# Patient Record
Sex: FEMALE | Employment: OTHER | URBAN - METROPOLITAN AREA
[De-identification: names, ages, dates, MRNs, and addresses within clinical notes are randomized per-mention and may not be internally consistent; named-entity substitution may affect disease eponyms.]

---

## 2017-04-19 ENCOUNTER — OFFICE VISIT (OUTPATIENT)
Dept: FAMILY MEDICINE | Facility: CLINIC | Age: 47
End: 2017-04-19
Payer: MEDICARE

## 2017-04-19 ENCOUNTER — TELEPHONE (OUTPATIENT)
Dept: FAMILY MEDICINE | Facility: CLINIC | Age: 47
End: 2017-04-19

## 2017-04-19 VITALS
OXYGEN SATURATION: 96 % | WEIGHT: 181 LBS | TEMPERATURE: 98 F | DIASTOLIC BLOOD PRESSURE: 80 MMHG | HEIGHT: 66 IN | BODY MASS INDEX: 29.09 KG/M2 | HEART RATE: 86 BPM | SYSTOLIC BLOOD PRESSURE: 120 MMHG

## 2017-04-19 DIAGNOSIS — M54.41 LOW BACK PAIN WITH RIGHT-SIDED SCIATICA, UNSPECIFIED BACK PAIN LATERALITY, UNSPECIFIED CHRONICITY: ICD-10-CM

## 2017-04-19 DIAGNOSIS — L70.0 ACNE VULGARIS: ICD-10-CM

## 2017-04-19 DIAGNOSIS — J30.2 SEASONAL ALLERGIC RHINITIS, UNSPECIFIED ALLERGIC RHINITIS TRIGGER: ICD-10-CM

## 2017-04-19 DIAGNOSIS — B96.89 BACTERIAL VAGINOSIS: ICD-10-CM

## 2017-04-19 DIAGNOSIS — R30.0 DYSURIA: Primary | ICD-10-CM

## 2017-04-19 DIAGNOSIS — Z91.010 PEANUT ALLERGY: ICD-10-CM

## 2017-04-19 DIAGNOSIS — N76.0 BACTERIAL VAGINOSIS: ICD-10-CM

## 2017-04-19 DIAGNOSIS — F31.9 BIPOLAR AFFECTIVE DISORDER, REMISSION STATUS UNSPECIFIED (H): ICD-10-CM

## 2017-04-19 LAB
ALBUMIN UR-MCNC: NEGATIVE MG/DL
APPEARANCE UR: CLEAR
BACTERIA #/AREA URNS HPF: ABNORMAL /HPF
BILIRUB UR QL STRIP: NEGATIVE
COLOR UR AUTO: YELLOW
GLUCOSE UR STRIP-MCNC: NEGATIVE MG/DL
HGB UR QL STRIP: ABNORMAL
KETONES UR STRIP-MCNC: NEGATIVE MG/DL
LEUKOCYTE ESTERASE UR QL STRIP: NEGATIVE
MICRO REPORT STATUS: ABNORMAL
NITRATE UR QL: NEGATIVE
NON-SQ EPI CELLS #/AREA URNS LPF: ABNORMAL /LPF
PH UR STRIP: 6 PH (ref 5–7)
RBC #/AREA URNS AUTO: ABNORMAL /HPF (ref 0–2)
SP GR UR STRIP: 1.02 (ref 1–1.03)
SPECIMEN SOURCE: ABNORMAL
URN SPEC COLLECT METH UR: ABNORMAL
UROBILINOGEN UR STRIP-ACNC: 0.2 EU/DL (ref 0.2–1)
WBC #/AREA URNS AUTO: ABNORMAL /HPF (ref 0–2)
WET PREP SPEC: ABNORMAL

## 2017-04-19 PROCEDURE — 81001 URINALYSIS AUTO W/SCOPE: CPT | Performed by: PHYSICIAN ASSISTANT

## 2017-04-19 PROCEDURE — 87210 SMEAR WET MOUNT SALINE/INK: CPT | Performed by: PHYSICIAN ASSISTANT

## 2017-04-19 PROCEDURE — 99214 OFFICE O/P EST MOD 30 MIN: CPT | Performed by: PHYSICIAN ASSISTANT

## 2017-04-19 RX ORDER — EPINEPHRINE 0.3 MG/.3ML
0.3 INJECTION SUBCUTANEOUS PRN
Qty: 0.6 ML | Refills: 1 | Status: SHIPPED | OUTPATIENT
Start: 2017-04-19 | End: 2018-01-31

## 2017-04-19 RX ORDER — METRONIDAZOLE 500 MG/1
500 TABLET ORAL 2 TIMES DAILY
Qty: 14 TABLET | Refills: 0 | Status: SHIPPED | OUTPATIENT
Start: 2017-04-19 | End: 2017-07-26

## 2017-04-19 RX ORDER — CETIRIZINE HYDROCHLORIDE 5 MG/1
5 TABLET ORAL DAILY
Qty: 30 TABLET | Refills: 1 | Status: SHIPPED | OUTPATIENT
Start: 2017-04-19 | End: 2018-01-31

## 2017-04-19 RX ORDER — LORATADINE 10 MG/1
10 TABLET ORAL DAILY
Qty: 90 TABLET | Refills: 1 | Status: CANCELLED | OUTPATIENT
Start: 2017-04-19

## 2017-04-19 NOTE — PROGRESS NOTES
"  SUBJECTIVE:                                                    Bernarda Garrett is a 47 year old female who presents to clinic today for the following health issues:    Triaged 04/19/2017    Vaginal Symptoms     Onset: 4 days ago    Description:  Vaginal Discharge: white   Itching (Pruritis): no   Burning sensation: no   Odor: YES- fishy    Accompanying Signs & Symptoms:  Pain with Urination: YES  Abdominal Pain: YES  Fever: no    History:   Sexually active: YES  New Partner: no   Possibility of Pregnancy: No    Precipitating factors:   Recent Antibiotic Use: YES- bladder infection    Alleviating factors:  nothing   Therapies Tried and outcome: Appt needed      Rt sided abdominal pain and back pain.       Pt also needs new face medicine as her face is breaking out.     Patient states that she has increased vaginal discharge that has a \"fishy\" odor.  She reports that she also has some pelvic cramping and mild lower back pressure.      She also reports that she would like an Rx for her Epi-pen, allergy medication and vitamin D.      Problem list and histories reviewed & adjusted, as indicated.  Additional history: as documented      ROS:  Constitutional, HEENT, cardiovascular, pulmonary, GI, , musculoskeletal, neuro, skin, endocrine and psych systems are negative, except as otherwise noted.    OBJECTIVE:                                                    /80 (BP Location: Left arm, Patient Position: Chair, Cuff Size: Adult Large)  Pulse 86  Temp 98  F (36.7  C) (Oral)  Ht 5' 6\" (1.676 m)  Wt 181 lb (82.1 kg)  SpO2 96%  BMI 29.21 kg/m2  Body mass index is 29.21 kg/(m^2).  GENERAL: healthy, alert and no distress  EYES: Eyes grossly normal to inspection, PERRL and conjunctivae and sclerae normal  RESP: lungs clear to auscultation - no rales, rhonchi or wheezes  CV: regular rate and rhythm, normal S1 S2, no S3 or S4, no murmur, click or rub, no peripheral edema and peripheral pulses strong  ABDOMEN: soft, " nontender, no hepatosplenomegaly, no masses and bowel sounds normal  MS: no gross musculoskeletal defects noted, no edema  NEURO: Normal strength and tone, mentation intact and speech normal  BACK: no CVA tenderness, no paralumbar tenderness  PSYCH: mentation appears normal, affect normal/bright    Diagnostic Test Results:  Wet Prep - +Clue Cells  Urinalysis - unremarkable     ASSESSMENT/PLAN:                                                      Bernarda was seen today for abdominal pain.    Diagnoses and all orders for this visit:    Dysuria  -     Cancel: *UA reflex to Microscopic and Culture ([place] [facility] (except [place] and [place])  -     Cancel: Wet prep  -     UA reflex to Microscopic and Culture  -     Wet prep  -     Urine Microscopic    Bacterial vaginosis  -     metroNIDAZOLE (FLAGYL) 500 MG tablet; Take 1 tablet (500 mg) by mouth 2 times daily For 7 days. DO NOT drink alcohol while taking medication.    Bipolar affective disorder, remission status unspecified (H)  -     cholecalciferol (VITAMIN D) 1000 UNIT tablet; Take 1 tablet (1,000 Units) by mouth daily    Low back pain with right-sided sciatica, unspecified back pain laterality, unspecified chronicity  -     PHYSICAL THERAPY REFERRAL    Seasonal allergic rhinitis, unspecified allergic rhinitis trigger  -     cetirizine (ZYRTEC) 5 MG tablet; Take 1 tablet (5 mg) by mouth daily    Peanut allergy  -     EPINEPHrine 0.3 MG/0.3ML injection; Inject 0.3 mLs (0.3 mg) into the muscle as needed for anaphylaxis    Acne vulgaris  -     DERMATOLOGY REFERRAL    Other orders  -     Cancel: loratadine (CLARITIN) 10 MG tablet; Take 1 tablet (10 mg) by mouth daily      - Patient advised to do physical therapy for the back pain that has been stable, but chronic.      - We also discussed patient's costochondritis.  She was instructed to try 600 mg ibuprofen three times daily for about 5-7 days.      - She was instructed to be seen sooner if symptoms  change or worsen.      - Over 30 minutes was spent with patient and over 50% of the time was spent counseling and coordination of care.      See Patient Instructions:  Please take the metronidazole medication for the vaginal bacterial infection BV.  Do not drink alcohol on this medication while taking it and for the three days after stopping it.     Please follow-up if symptoms are not improved.     Please see Dr. Billy for the acne.     Please try the 5 mg Zyrtec for the allergies.      Please followup as needed.          Tiffanie Shannon PA-C    [facility] [place]

## 2017-04-19 NOTE — TELEPHONE ENCOUNTER
Vaginal Symptoms     Onset: 4 days ago    Description:  Vaginal Discharge: white   Itching (Pruritis): no   Burning sensation:  no   Odor: YES- fishy    Accompanying Signs & Symptoms:  Pain with Urination: YES  Abdominal Pain: YES  Fever: no    History:   Sexually active: YES  New Partner: no   Possibility of Pregnancy:  No    Precipitating factors:   Recent Antibiotic Use: YES- bladder infection    Alleviating factors:  nothing   Therapies Tried and outcome: Appt needed     Rt sided abdominal pain and back pain.       Pt also needs new face medicine as her face is breaking out.     Cely Woods RN    [facility]

## 2017-04-19 NOTE — PATIENT INSTRUCTIONS
Please take the metronidazole medication for the vaginal bacterial infection BV.  Do not drink alcohol on this medication while taking it and for the three days after stopping it.     Please follow-up if symptoms are not improved.     Please see Dr. Billy for the acne.     Please try the 5 mg Zyrtec for the allergies.      Please followup as needed.      Bacterial Vaginosis    You have a vaginal infection called bacterial vaginosis (BV). Both good and bad bacteria are present in a healthy vagina. BV occurs when these bacteria get out of balance. The number of bad bacteria increase. And the number of good bacteria decrease.  BV may or may not cause symptoms. If symptoms do occur, they can include:    Thin, gray, milky-white, or sometimes green discharge    Unpleasant odor or  fishy  smell    Itching, burning, or pain in or around the vagina  It is not known what causes BV, but certain factors can make the problem more likely. This can include:    Douching    Having sex with a new partner    Having sex with more than one partner  BV will sometimes go away on its own. But treatment is usually recommended. This is because untreated BV can increase the risk of more serious health problems such as:    Pelvic inflammatory disease (PID)     delivery (giving birth to a baby early if you re pregnant)    HIV and certain other sexually transmitted diseases (STDs)    Infection after surgery on the reproductive organs  Home care  General care    BV is most often treated with medicines called antibiotics. These may be given as pills or as a vaginal cream. If antibiotics are prescribed, be sure to use them exactly as directed. Also, be sure to complete all of the medicine, even if your symptoms go away.    Avoid douching or having sex during treatment.    If you have sex with a female partner, ask your healthcare provider if she should also be treated.  Prevention    Limit or avoid douching.    Avoid having sex. If you do  have sex, then take steps to lower your risk:    Use condoms when having sex.    Limit the number of partners you have sex with.  Follow-up care  Follow up with your healthcare provider, or as advised.  When to seek medical advice  Call your healthcare provider right away if:    You have a fever of 100.4 F (38 C) or higher, or as directed by your provider.    Your symptoms worsen, or they don t go away within a few days of starting treatment.    You have new pain in the lower belly or pelvic region.    You have side effects that bother you or a reaction to the pills or cream you re prescribed.    You or any partners you have sex with have new symptoms, such as a rash, joint pain, or sores.    5184-2497 [employer]. [address] [place] [address], [place] [address]. All rights reserved. This information is not intended as a substitute for professional medical care. Always follow your healthcare professional's instructions.

## 2017-04-19 NOTE — MR AVS SNAPSHOT
After Visit Summary   4/19/2017    Bernarda Garrett    MRN: [id number]           Patient Information     Date Of Birth          1970        Visit Information        Provider Department      4/19/2017 3:00 PM Tiffanie Shannon PA-C [facility] [place]        Today's Diagnoses     Dysuria    -  1    Bipolar affective disorder, remission status unspecified (H)        Dysfunction of eustachian tube, bilateral        Low back pain with right-sided sciatica, unspecified back pain laterality, unspecified chronicity        Seasonal allergic rhinitis, unspecified allergic rhinitis trigger        Food allergy, peanut        Peanut allergy        Bacterial vaginosis        Acne vulgaris          Care Instructions    Please take the metronidazole medication for the vaginal bacterial infection BV.  Do not drink alcohol on this medication while taking it and for the three days after stopping it.     Please follow-up if symptoms are not improved.     Please see Dr. Billy for the acne.     Please try the 5 mg Zyrtec for the allergies.      Please followup as needed.      Bacterial Vaginosis    You have a vaginal infection called bacterial vaginosis (BV). Both good and bad bacteria are present in a healthy vagina. BV occurs when these bacteria get out of balance. The number of bad bacteria increase. And the number of good bacteria decrease.  BV may or may not cause symptoms. If symptoms do occur, they can include:    Thin, gray, milky-white, or sometimes green discharge    Unpleasant odor or  fishy  smell    Itching, burning, or pain in or around the vagina  It is not known what causes BV, but certain factors can make the problem more likely. This can include:    Douching    Having sex with a new partner    Having sex with more than one partner  BV will sometimes go away on its own. But treatment is usually recommended. This is because untreated BV can increase the risk of more serious health problems such  as:    Pelvic inflammatory disease (PID)     delivery (giving birth to a baby early if you re pregnant)    HIV and certain other sexually transmitted diseases (STDs)    Infection after surgery on the reproductive organs  Home care  General care    BV is most often treated with medicines called antibiotics. These may be given as pills or as a vaginal cream. If antibiotics are prescribed, be sure to use them exactly as directed. Also, be sure to complete all of the medicine, even if your symptoms go away.    Avoid douching or having sex during treatment.    If you have sex with a female partner, ask your healthcare provider if she should also be treated.  Prevention    Limit or avoid douching.    Avoid having sex. If you do have sex, then take steps to lower your risk:    Use condoms when having sex.    Limit the number of partners you have sex with.  Follow-up care  Follow up with your healthcare provider, or as advised.  When to seek medical advice  Call your healthcare provider right away if:    You have a fever of 100.4 F (38 C) or higher, or as directed by your provider.    Your symptoms worsen, or they don t go away within a few days of starting treatment.    You have new pain in the lower belly or pelvic region.    You have side effects that bother you or a reaction to the pills or cream you re prescribed.    You or any partners you have sex with have new symptoms, such as a rash, joint pain, or sores.    8392-5884 [employer]. [address] [place] [address]. All rights reserved. This information is not intended as a substitute for professional medical care. Always follow your healthcare professional's instructions.              Follow-ups after your visit        Additional Services     DERMATOLOGY REFERRAL       Your provider has referred you to: [place]: [facility] [place] [phone]   [email or web address]    Please be  aware that coverage of these services is subject to the terms and limitations of your health insurance plan.  Call member services at your health plan with any benefit or coverage questions.      Please bring the following with you to your appointment:    (1) Any X-Rays, CTs or MRIs which have been performed.  Contact the facility where they were done to arrange for  prior to your scheduled appointment.    (2) List of current medications  (3) This referral request   (4) Any documents/labs given to you for this referral            PHYSICAL THERAPY REFERRAL       *This therapy referral will be filtered to a centralized scheduling office at [facility] and the patient will receive a call to schedule an appointment at a [facility] location most convenient for them. *     [facility] provides Physical Therapy evaluation and treatment and many specialty services across the [facility] system.  If requesting a specialty program, please choose from the list below.    If you have not heard from the scheduling office within 2 business days, please call [phone] for all locations, with the exception of [place], please call [phone].  Treatment: Evaluation & Treatment  Special Instructions/Modalities: None  Special Programs: None    Please be aware that coverage of these services is subject to the terms and limitations of your health insurance plan.  Call member services at your health plan with any benefit or coverage questions.      **Note to Provider:  If you are referring outside of [facility] for the therapy appointment, please list the name of the location in the  special instructions  above, print the referral and give to the patient to schedule the appointment.                  Your next 10 appointments already scheduled     Apr 19, 2017  3:00 PM CDT   [facility]    [address]  [address]  [facility] [address]   [phone]           Bring a current list of meds and any records pertaining to this visit.  For Physicals, please bring immunization records and any forms needing to be filled out.  Please arrive 10 minutes early to complete paperwork.              Who to contact     If you have questions or need follow up information about today's clinic visit or your schedule please contact [facility] directly at [phone].  Normal or non-critical lab and imaging results will be communicated to you by MyChart, letter or phone within 4 business days after the clinic has received the results. If you do not hear from us within 7 days, please contact the clinic through [employer] or phone. If you have a critical or abnormal lab result, we will notify you by phone as soon as possible.  Submit refill requests through [employer] or call your pharmacy and they will forward the refill request to us. Please allow 3 business days for your refill to be completed.          Additional Information About Your Visit        [facility] [facility] Information     [employer] lets you send messages to your doctor, view your test results, renew your prescriptions, schedule appointments and more. To sign up, go to [email or web address] . Click on \"Log in\" on the left side of the screen, which will take you to the Welcome page. Then click on \"Sign up Now\" on the right side of the page.     You will be asked to enter the access code listed below, as well as some personal information. Please follow the directions to create your username and password.     Your access code is: [email or web address]  Expires: 2017  2:38 PM     Your access code will  in 90 days. If you need help or a new code, please call your [place] clinic or [phone].        Care EveryWhere ID     This is your Care EveryWhere ID. This could be used by other organizations to access your [place] medical records  [id number]        Your Vitals " "Were     Pulse Temperature Height Pulse Oximetry BMI (Body Mass Index)       86 98  F (36.7  C) (Oral) 5' 6\" (1.676 m) 96% 29.21 kg/m2        Blood Pressure from Last 3 Encounters:   04/19/17 120/80   06/29/16 116/82   04/05/16 122/80    Weight from Last 3 Encounters:   04/19/17 181 lb (82.1 kg)   06/29/16 187 lb 3.2 oz (84.9 kg)   04/05/16 188 lb 12.8 oz (85.6 kg)              We Performed the Following     DERMATOLOGY REFERRAL     PHYSICAL THERAPY REFERRAL     UA reflex to Microscopic and Culture     Urine Microscopic     Wet prep          Today's Medication Changes          These changes are accurate as of: 4/19/17  2:49 PM.  If you have any questions, ask your nurse or doctor.               Start taking these medicines.        Dose/Directions    cetirizine 5 MG tablet   Commonly known as:  zyrTEC   Used for:  Seasonal allergic rhinitis, unspecified allergic rhinitis trigger   Started by:  Tiffanie Shannon PA-C        Dose:  5 mg   Take 1 tablet (5 mg) by mouth daily   Quantity:  30 tablet   Refills:  1       metroNIDAZOLE 500 MG tablet   Commonly known as:  FLAGYL   Used for:  Bacterial vaginosis   Started by:  Tiffanie Shannon PA-C        Dose:  500 mg   Take 1 tablet (500 mg) by mouth 2 times daily For 7 days. DO NOT drink alcohol while taking medication.   Quantity:  14 tablet   Refills:  0         These medicines have changed or have updated prescriptions.        Dose/Directions    * EPINEPHrine 0.3 MG/0.3ML injection   This may have changed:  Another medication with the same name was added. Make sure you understand how and when to take each.   Used for:  Food allergy, peanut   Changed by:  Odessa Mathew PA-C        Dose:  0.3 mg   Inject 0.3 mLs (0.3 mg) into the muscle once as needed for anaphylaxis   Quantity:  2 each   Refills:  1       * EPINEPHrine 0.3 MG/0.3ML injection   This may have changed:  You were already taking a medication with the same name, and this prescription was added. " Make sure you understand how and when to take each.   Used for:  Peanut allergy   Changed by:  Tiffanie Shannon PA-C        Dose:  0.3 mg   Inject 0.3 mLs (0.3 mg) into the muscle as needed for anaphylaxis   Quantity:  0.6 mL   Refills:  1       * Notice:  This list has 2 medication(s) that are the same as other medications prescribed for you. Read the directions carefully, and ask your doctor or other care provider to review them with you.      Stop taking these medicines if you haven't already. Please contact your care team if you have questions.     clindamycin 1 % solution   Commonly known as:  CLEOCIN T   Stopped by:  Tiffanie Shannon PA-C                Where to get your medicines      These medications were sent to [facility] [place] - [address]  [address] [place] [id number]     Phone:  [phone]     cetirizine 5 MG tablet    cholecalciferol 1000 UNIT tablet    EPINEPHrine 0.3 MG/0.3ML injection    metroNIDAZOLE 500 MG tablet                Primary Care Provider Office Phone #    [facility] [phone]       No address on file        Thank you!     Thank you for choosing [facility]  for your care. Our goal is always to provide you with excellent care. Hearing back from our patients is one way we can continue to improve our services. Please take a few minutes to complete the written survey that you may receive in the mail after your visit with us. Thank you!             Your Updated Medication List - Protect others around you: Learn how to safely use, store and throw away your medicines at www.disposemymeds.org.          This list is accurate as of: 4/19/17  2:49 PM.  Always use your most recent med list.                   Brand Name Dispense Instructions for use    cetirizine 5 MG tablet    zyrTEC    30 tablet    Take 1 tablet (5 mg) by mouth daily       cholecalciferol 1000 UNIT tablet    vitamin D     100 tablet    Take 1 tablet (1,000 Units) by mouth daily       * EPINEPHrine 0.3 MG/0.3ML injection     2 each    Inject 0.3 mLs (0.3 mg) into the muscle once as needed for anaphylaxis       * EPINEPHrine 0.3 MG/0.3ML injection     0.6 mL    Inject 0.3 mLs (0.3 mg) into the muscle as needed for anaphylaxis       metroNIDAZOLE 500 MG tablet    FLAGYL    14 tablet    Take 1 tablet (500 mg) by mouth 2 times daily For 7 days. DO NOT drink alcohol while taking medication.       tretinoin 0.025 % cream    RETIN-A    45 g    Spread a pea size amount into affected area topically at bedtime.  Use sunscreen SPF>20.       * Notice:  This list has 2 medication(s) that are the same as other medications prescribed for you. Read the directions carefully, and ask your doctor or other care provider to review them with you.

## 2017-04-19 NOTE — NURSING NOTE
"Chief Complaint   Patient presents with     Abdominal Pain       Initial /80 (BP Location: Left arm, Patient Position: Chair, Cuff Size: Adult Large)  Pulse 86  Temp 98  F (36.7  C) (Oral)  Ht 5' 6\" (1.676 m)  Wt 181 lb (82.1 kg)  SpO2 96%  BMI 29.21 kg/m2 Estimated body mass index is 29.21 kg/(m^2) as calculated from the following:    Height as of this encounter: 5' 6\" (1.676 m).    Weight as of this encounter: 181 lb (82.1 kg).  Medication Reconciliation: complete  "

## 2017-07-14 DIAGNOSIS — B96.89 BACTERIAL VAGINOSIS: ICD-10-CM

## 2017-07-14 DIAGNOSIS — N76.0 BACTERIAL VAGINOSIS: ICD-10-CM

## 2017-07-14 NOTE — TELEPHONE ENCOUNTER
Reason for Call:  Medication or medication refill:    Do you use a [facility]?  Name of the pharmacy and phone number for the current request:  [facility] - [phone]    Name of the medication requested: Flagyl    Other request: Pt called this morning requesting a refill on her Flagyl medication. I told pt that she may need to be seen again, however, if she does a nurse/ member of Tiffanie Shannon's team would contact her.     Can we leave a detailed message on this number? YES    Phone number patient can be reached at: Cell number on file:    Telephone Information:   Mobile [phone]       Best Time:     Call taken on 7/14/2017 at 10:30 AM by Monik Olsen

## 2017-07-14 NOTE — TELEPHONE ENCOUNTER
metroNIDAZOLE (FLAGYL) 500 MG tablet      Last Written Prescription Date:  4/19/2017  Last Fill Quantity: 14 tablet,   # refills: 0  Last Office Visit with [facility], [facility] or  Health prescribing provider: 4/19/2017  Future Office visit:       Routing refill request to provider for review/approval because:  Drug not on the [facility], [facility] or [facility] refill protocol or controlled substance

## 2017-07-19 RX ORDER — METRONIDAZOLE 500 MG/1
500 TABLET ORAL 2 TIMES DAILY
Qty: 14 TABLET | Refills: 0 | OUTPATIENT
Start: 2017-07-19

## 2017-07-19 NOTE — TELEPHONE ENCOUNTER
Office visit needed.  Pharmacy advised.    Cely Palma, BS, RN, PHN  [facility]) [phone]

## 2017-07-26 ENCOUNTER — OFFICE VISIT (OUTPATIENT)
Dept: FAMILY MEDICINE | Facility: CLINIC | Age: 47
End: 2017-07-26
Payer: MEDICARE

## 2017-07-26 ENCOUNTER — CARE COORDINATION (OUTPATIENT)
Dept: CARE COORDINATION | Facility: CLINIC | Age: 47
End: 2017-07-26

## 2017-07-26 VITALS
OXYGEN SATURATION: 100 % | DIASTOLIC BLOOD PRESSURE: 80 MMHG | HEART RATE: 76 BPM | SYSTOLIC BLOOD PRESSURE: 138 MMHG | BODY MASS INDEX: 28.93 KG/M2 | HEIGHT: 66 IN | WEIGHT: 180 LBS | TEMPERATURE: 98.5 F

## 2017-07-26 DIAGNOSIS — N89.8 VAGINAL ODOR: Primary | ICD-10-CM

## 2017-07-26 DIAGNOSIS — N76.0 BACTERIAL VAGINOSIS: ICD-10-CM

## 2017-07-26 DIAGNOSIS — F31.9 BIPOLAR AFFECTIVE DISORDER, REMISSION STATUS UNSPECIFIED (H): ICD-10-CM

## 2017-07-26 DIAGNOSIS — B96.89 BACTERIAL VAGINOSIS: ICD-10-CM

## 2017-07-26 LAB
MICRO REPORT STATUS: ABNORMAL
SPECIMEN SOURCE: ABNORMAL
WET PREP SPEC: ABNORMAL

## 2017-07-26 PROCEDURE — 99214 OFFICE O/P EST MOD 30 MIN: CPT | Performed by: PHYSICIAN ASSISTANT

## 2017-07-26 PROCEDURE — 87210 SMEAR WET MOUNT SALINE/INK: CPT | Performed by: PHYSICIAN ASSISTANT

## 2017-07-26 RX ORDER — CETIRIZINE HYDROCHLORIDE 5 MG/1
5 TABLET ORAL DAILY
Qty: 30 TABLET | Refills: 1 | Status: CANCELLED | OUTPATIENT
Start: 2017-07-26

## 2017-07-26 ASSESSMENT — ANXIETY QUESTIONNAIRES
2. NOT BEING ABLE TO STOP OR CONTROL WORRYING: NEARLY EVERY DAY
IF YOU CHECKED OFF ANY PROBLEMS ON THIS QUESTIONNAIRE, HOW DIFFICULT HAVE THESE PROBLEMS MADE IT FOR YOU TO DO YOUR WORK, TAKE CARE OF THINGS AT HOME, OR GET ALONG WITH OTHER PEOPLE: EXTREMELY DIFFICULT
6. BECOMING EASILY ANNOYED OR IRRITABLE: NEARLY EVERY DAY
GAD7 TOTAL SCORE: 20
3. WORRYING TOO MUCH ABOUT DIFFERENT THINGS: NEARLY EVERY DAY
7. FEELING AFRAID AS IF SOMETHING AWFUL MIGHT HAPPEN: NEARLY EVERY DAY
1. FEELING NERVOUS, ANXIOUS, OR ON EDGE: NEARLY EVERY DAY
5. BEING SO RESTLESS THAT IT IS HARD TO SIT STILL: MORE THAN HALF THE DAYS

## 2017-07-26 ASSESSMENT — PATIENT HEALTH QUESTIONNAIRE - PHQ9: 5. POOR APPETITE OR OVEREATING: NEARLY EVERY DAY

## 2017-07-26 NOTE — NURSING NOTE
"Chief Complaint   Patient presents with     Depression     Anxiety       Initial /80 (BP Location: Left arm, Patient Position: Chair, Cuff Size: Adult Large)  Pulse 76  Temp 98.5  F (36.9  C) (Oral)  Ht 5' 6\" (1.676 m)  Wt 180 lb (81.6 kg)  SpO2 100%  Breastfeeding? No  BMI 29.05 kg/m2 Estimated body mass index is 29.05 kg/(m^2) as calculated from the following:    Height as of this encounter: 5' 6\" (1.676 m).    Weight as of this encounter: 180 lb (81.6 kg).  Medication Reconciliation: complete   Csaba Mlnarik CMA    "

## 2017-07-26 NOTE — PROGRESS NOTES
"  SUBJECTIVE:                                                    Bernarda Garrett is a 47 year old female who presents to clinic today for the following health issues:      Abnormal Mood Symptoms  Onset: many years    Description:   Depression: YES  Anxiety: YES    Accompanying Signs & Symptoms:  Still participating in activities that you used to enjoy: No - does not do anything  Fatigue: YES- states she is tired, no energy, cries in bathroom away from children  Irritability: YES- and doesn't want kids to think it is them  Difficulty concentrating: YES- head is   Changes in appetite: YES- has no appetite - feels nauseous and ears throb-hears heart beat   Problems with sleep: YES- nightmares - unable to sleep  Heart racing/beating fast : YES- has panic attacks - has therapist see's every wednesday  Thoughts of hurting yourself or others: yes -- states no one can take care of her kids the way she can    History:   Recent stress: YES- Mom is very ill, 4 year old is autistic-has sciatic pain from edipural-disabled  Prior depression hospitalization: YES - 30 days - 18 years ago -- diagnosed with bipolar -- agoraphobia  Family history of depression: unknown  Family history of anxiety: unknown    Precipitating factors:   Alcohol/drug use: no    Alleviating factors:  Kids --- 4 year old -autistic, 6 year old -learning delays, 15 - lazy, 21, 24, 25,27,29 year olds -- 2 grandchildren    Pt is very stand off'ronny - keeps kids home from school some days    Therapies Tried and outcome: Klonopin (Clonazepam), Zoloft (Sertraline) and Tramadol, Lamictal --- has had difficulty gets medications correct for her    Patient feels like she has been feeling like this for a while (at least a year), but it feels like things are bubbling over now.    She reports that she does have thoughts that if she were not here, it would be a relief.  She says that she would not kill herself or harm herself or others.  She states that \"no one can care for my " "kids like me\" and therefore she wouldn't do anything to herself.      I asked the patient if the thought of dying was scary or pleasant.  She said it seemed like a relief.      Her PHQ9 and GAD7 scores are elevated today.      She states that her mom is ill with congestive heart failure.      The patient is newer to the [facility] and does not have a lot of help from family or friends.  She is not .      Patient is crying and emotional in clinic today.      Problem list and histories reviewed & adjusted, as indicated.  Additional history: as documented      ROS:  Constitutional, HEENT, cardiovascular, pulmonary, GI, , musculoskeletal, neuro, skin, endocrine and psych systems are negative, except as otherwise noted.    OBJECTIVE:                                                    /80 (BP Location: Left arm, Patient Position: Chair, Cuff Size: Adult Large)  Pulse 76  Temp 98.5  F (36.9  C) (Oral)  Ht 5' 6\" (1.676 m)  Wt 180 lb (81.6 kg)  SpO2 100%  Breastfeeding? No  BMI 29.05 kg/m2  Body mass index is 29.05 kg/(m^2).  GENERAL: healthy, alert and no distress  MS: no gross musculoskeletal defects noted, no edema  NEURO: Normal strength and tone, mentation intact and speech normal  PSYCH: mentation appears fine, she is crying and emotional in clinic today.      Diagnostic Test Results:  Wet Prep - Pos Clue Cells     ASSESSMENT/PLAN:                                                      Bernarda was seen today for depression and anxiety.    Diagnoses and all orders for this visit:    Vaginal odor  -     Wet prep    Bipolar affective disorder, remission status unspecified (H)  -     CARE COORDINATION REFERRAL    Bacterial vaginosis    Other orders  -     Cancel: cholecalciferol (VITAMIN D) 1000 UNIT tablet; Take 1 tablet (1,000 Units) by mouth daily  -     Cancel: cetirizine (ZYRTEC) 5 MG tablet; Take 1 tablet (5 mg) by mouth daily        - Based on the discussion in clinic and the PHQ9 and GAD7 I " "told the patient that I felt it would be best if she were assessed in the [place] ED for more immediate mental health evaluation.  I did not think that a referral to psychiatry for followup in the future was helpful enough at this time.  I did speak with Clif our care coordinator and evaluation in the ED was advised by her as well.  The patient understood this and agreed to go.  She had concerns about being admitted as she cannot be away from her kids that long.  I told her I was unsure if this would be an admit or not.    The patient did contract for safety today.  She reports to having thoughts that being gone or dead are relieving, but that she does not have a plan to hurt herself.  She states that she need to be around for her kids and therefore would not kill herself.    Patient does not drive, she took medical transport to the clinic today.  She does not have access to a .  I asked the patient if she was willing to go to the hospital via ambulance and she replied \"yes.\"  I stepped out of the exam room to notify Oriana to get this started.  When I came back in, she said that since she could not get a hold of her family (kids) she was not going to the [facility] ER via ambulance.  At this point they were already en route.  She agreed to talk to them when they arrive.    A  with [employer], Darren Pulido, showed up to the clinic first, I gave him a brief report and informed him that she did contract for safety today.  He went in the room and had a brief discussion with the patient.  I was not present for the conversation.  I stepped in at the end and heard the police ask patient if his presence in the room was making her upset and she said no.  She reported again that she did not want to go the the ED via ambulance.      The Paramedics showed up and I gave report to them.  I told them she contracts for safety, the female paramedic stated she understood and went into the room.  I " "entered shortly after.  They were advising that the patient go be assessed at the ED.  The male EMT asked the patient what she told the nurse about her risk of self harm, she repeated exactly what I have stated in my note:  The thought of dying seems like a relief, but she reports that she does not have a plan or an intent of suicide or to hurt herself or others.  She says that she would not hurt herself or her kids because she knows that she is the best person to care for her children.  The male EMT stated that is ok, but based on what the officer heard from the patient \"earlier\" the officer was going to initiate a transport hold until she is assessed by an MD.  The officer, who was also in the room in the corner near the entrance door, nodded yes as the paramedic stated that the office intends to place a hold.  The patient was given the choice to go voluntarily with EMT to the [place] ED or the officer would place the transport hold.  The patient became upset and then agreed to go.  They left clinic abruptly.  No one discussed with me, the officer and the paramedics left with the patient.  The plan is the patient will be ambulance transferred to the [place] ED for further mental health assessment.    I called the [facility] ED and was transferred to the behavioral intake line to give report of the patient's arrival.  A phone message was taken by Jason Goncalves.            See Patient Instructions        Tiffanie Shannon PA-C    [facility]    "

## 2017-07-26 NOTE — MR AVS SNAPSHOT
After Visit Summary   7/26/2017    Bernarda Garrett    MRN: [id number]           Patient Information     Date Of Birth          1970        Visit Information        Provider Department      7/26/2017 3:00 PM Tiffanie Shannon, SAUMYA [facility]        Today's Diagnoses     Vaginal odor    -  1    Bipolar affective disorder, remission status unspecified (H)        Bacterial vaginosis           Follow-ups after your visit        Additional Services     CARE COORDINATION REFERRAL       Services are provided by a Care Coordinator for people with complex needs such as: medical, social, or financial troubles.  The Care Coordinator works with the patient and their Primary Care Provider to determine health goals, obtain resources, achieve outcomes, and develop care plans that help coordinate the patient's care.     Reason for Referral: Caregiver Concerns, Mental Status/Behavioral Changes and Other Financial Concerns    Provide additional details for Care Coordination to best meet the patient's current needs: None    Clinical Staff have discussed the Care Coordination Referral with the patient and/or caregiver: yes                  Who to contact     If you have questions or need follow up information about today's clinic visit or your schedule please contact [facility] directly at [phone].  Normal or non-critical lab and imaging results will be communicated to you by MyChart, letter or phone within 4 business days after the clinic has received the results. If you do not hear from us within 7 days, please contact the clinic through MyChart or phone. If you have a critical or abnormal lab result, we will notify you by phone as soon as possible.  Submit refill requests through [employer] or call your pharmacy and they will forward the refill request to us. Please allow 3 business days for your refill to be completed.          Additional Information About Your Visit        MyChart  "Information     [employer] lets you send messages to your doctor, view your test results, renew your prescriptions, schedule appointments and more. To sign up, go to [email or web address] . Click on \"Log in\" on the left side of the screen, which will take you to the Welcome page. Then click on \"Sign up Now\" on the right side of the page.     You will be asked to enter the access code listed below, as well as some personal information. Please follow the directions to create your username and password.     Your access code is: [email or web address]  Expires: 10/24/2017  5:20 PM     Your access code will  in 90 days. If you need help or a new code, please call your [facility] clinic or [phone].        Care EveryWhere ID     This is your [facility] EveryWhere ID. This could be used by other organizations to access your [facility] medical records  [id number]        Your Vitals Were     Pulse Temperature Height Pulse Oximetry Breastfeeding? BMI (Body Mass Index)    76 98.5  F (36.9  C) (Oral) 5' 6\" (1.676 m) 100% No 29.05 kg/m2       Blood Pressure from Last 3 Encounters:   17 138/80   17 120/80   16 116/82    Weight from Last 3 Encounters:   17 180 lb (81.6 kg)   17 181 lb (82.1 kg)   16 187 lb 3.2 oz (84.9 kg)              We Performed the Following     CARE COORDINATION REFERRAL     Wet prep          Today's Medication Changes          These changes are accurate as of: 17  5:20 PM.  If you have any questions, ask your nurse or doctor.               Stop taking these medicines if you haven't already. Please contact your care team if you have questions.     tretinoin 0.025 % cream   Commonly known as:  RETIN-A   Stopped by:  Tiffanie Shannon PA-C                    Primary Care Provider Office Phone # Fax #    Tiffanie Shannon PA-C [phone]       [facility] - [address] [address]        Equal Access to Services     CAROLA MALIK AH: " Hadii graham myers Sopascualali, waaxda luqadaha, qaybta kaaljoshua dewey, kev florenciael huizargilda santhosh. So [facility] [phone].    ATENCIÓN: Si sarah simon, tiene a novoa disposición servicios gratuitos de asistencia lingüística. Fahadame al [phone].    We comply with applicable federal civil rights laws and [place] laws. We do not discriminate on the basis of race, color, national origin, age, disability sex, sexual orientation or gender identity.            Thank you!     Thank you for choosing [facility]  for your care. Our goal is always to provide you with excellent care. Hearing back from our patients is one way we can continue to improve our services. Please take a few minutes to complete the written survey that you may receive in the mail after your visit with us. Thank you!             Your Updated Medication List - Protect others around you: Learn how to safely use, store and throw away your medicines at www.disposemymeds.org.          This list is accurate as of: 7/26/17  5:20 PM.  Always use your most recent med list.                   Brand Name Dispense Instructions for use Diagnosis    cetirizine 5 MG tablet    zyrTEC    30 tablet    Take 1 tablet (5 mg) by mouth daily    Seasonal allergic rhinitis, unspecified allergic rhinitis trigger       cholecalciferol 1000 UNIT tablet    vitamin D    100 tablet    Take 1 tablet (1,000 Units) by mouth daily    Bipolar affective disorder, remission status unspecified (H)       EPINEPHrine 0.3 MG/0.3ML injection 2-pack    EPIPEN/ADRENACLICK/or ANY BX GENERIC EQUIV    0.6 mL    Inject 0.3 mLs (0.3 mg) into the muscle as needed for anaphylaxis    Peanut allergy

## 2017-07-26 NOTE — PROGRESS NOTES
"Clinic Care Coordination Contact  OUTREACH    Referral Information:  Referral Source: PCP  Reason for Contact: SAUMYA called while pt in clinic with concerns for pt's safety and SW input on follow up regarding depression/suicidal thoughts/daily stressors.     Plan: SW recommended a DEC/BEC assessment since thoughts are daily \"Maybe it would be better if I wasn't here.\"    SW will out reach again tomorrow; complete a psychosocial assessment; plan resources accordingly.    Becky Conklin [facility]  Clinic Care Coordinator-  Clinics: [place], [place], [place]  E-Mail: [email or web address]  Telephone: [phone]  "

## 2017-07-27 DIAGNOSIS — B96.89 BV (BACTERIAL VAGINOSIS): Primary | ICD-10-CM

## 2017-07-27 DIAGNOSIS — N76.0 BV (BACTERIAL VAGINOSIS): Primary | ICD-10-CM

## 2017-07-27 RX ORDER — METRONIDAZOLE 7.5 MG/G
1 GEL VAGINAL AT BEDTIME
Qty: 70 G | Refills: 0 | Status: SHIPPED | OUTPATIENT
Start: 2017-07-27 | End: 2017-08-01

## 2017-07-27 ASSESSMENT — PATIENT HEALTH QUESTIONNAIRE - PHQ9: SUM OF ALL RESPONSES TO PHQ QUESTIONS 1-9: 26

## 2017-07-27 ASSESSMENT — ANXIETY QUESTIONNAIRES: GAD7 TOTAL SCORE: 20

## 2017-07-27 NOTE — PROGRESS NOTES
Note to staff: Please call the patient to explain results.  Please send MetroGel Vaginal 0.75% to patient's pharmacy of choice.  Sig:  Place 1 applicator (5 g) vaginally At Bedtime for 5 days.      The results from your recent vaginal wet prep did show evidence of bacterial vaginosis.  We will have you try the vaginal suppository cream (metrogel) for treatment this time instead of the oral medication.  This will be sent to your pharmacy.  Please be sure to avoid alcohol consumption while using this medication.      Please followup if symptoms are not improved.  Please be seen sooner if symptoms change or worsen in any way.        Thank you for choosing [facility] for your health care needs,      Tiffanie Shannon PA-C

## 2017-07-28 ENCOUNTER — CARE COORDINATION (OUTPATIENT)
Dept: CARE COORDINATION | Facility: CLINIC | Age: 47
End: 2017-07-28

## 2017-07-28 NOTE — LETTER
[facility] CARE COORDINATION  [address] [facility] [address]  Phone: [phone]      July 28, 2017      Bernarda Garrett  [address]  [place] [facility] [address]    Dear Bernarda,  I am the Clinic Care Coordinator that works with your primary care provider's clinic. I have been trying to reach you recently to introduce Clinic Care Coordination and to see if there was anything I could assist you with.  I recently tried to call and was unable to reach you. Below is a description of what Clinic Care Coordination is and how I can further assist you.     The Clinic Care Coordinator role is a Registered Nurse and/or  who understands the health care system. The goal of Clinic Care Coordination is to help you manage your health and improve access to the [facility] system in the most efficient manner.  The Registered Nurse can assist you in meeting your health care goals by providing education, coordinating services, and strengthening the communication among your providers. The  can assist you with financial, behavioral, psychosocial, and chemical dependency and counseling/psychiatric resources.    Please feel free to keep this letter and contact information to contact me at [phone] with any further questions or concerns that may arise. We at [facility] are focused on providing you with the highest-quality healthcare experience possible and that all starts with you.       Sincerely,     Rachele Conklin    Enclosed: I have enclosed a copy of a 24 Hour Access Plan. This has helpful phone numbers for you to call when needed. Please keep this in an easy to access place to use as needed.

## 2017-07-28 NOTE — LETTER
Health Care Home - Access Care Plan    About Me  Patient Name:  Bernarda Garrett    YOB: 1970  Age:                            47 year old   Yanet MRN:         [id number] Telephone Information:     Home Phone [phone]   Mobile [phone]       Address:    [address]  [place] [facility] [address] Email address:  No e-mail address on record      Emergency Contact(s)  Name Relationship Lgl Grd Work Phone Home Phone Mobile Phone   1. HIWOT BLANCAS Friend    [phone]             Health Maintenance:      My Access Plan  Medical Emergency 911   Questions or concerns during clinic hours Primary Clinic Line, I will call the clinic directly: Primary Clinic: [facility] [phone]   24 Hour Appointment Line [phone] or  [phone])  (toll free)   24 Hour Nurse Line [phone] (toll free)   Questions or concerns outside clinic hours 24 Hour Appointment Line, I will call the after-hours on-call line:   [facility] [phone] or [phone] ([phone]) (toll-free)   Preferred Urgent Care     Preferred Hospital     Preferred Pharmacy [facility] [address] [place], [place] - [address] [facility]     Behavioral Health Crisis Line The National Suicide Prevention Lifeline at 1-336.552.2404 or 91     My Care Team Members  Patient Care Team       Relationship Specialty Notifications Start End    Tiffanie Shannon PA-C PCP - General Physician Assistant  7/26/17     Phone: [phone] Fax: [phone]         [facility] [address] [address]    Rachele Conklin LSW  Clinic  7/28/17     Comment:  Care Coordination    Phone: [phone] Fax: [phone]                 My Medical and Care Information  Problem List   Patient Active Problem List   Diagnosis     Bipolar affective disorder (H)     Heart murmur     Acne     Mitral valve disorder      Current Medications and Allergies:  See printed Medication  Report

## 2017-07-28 NOTE — PROGRESS NOTES
Clinic Care Coordination Contact  [facility]/Voicemail    Referral Source: PCP  Clinical Data: Care Coordinator Outreach  Outreach attempted x 1.  Left message on voicemail with call back information and requested return call.  Plan: Care Coordinator NO WORKING TELEPHONE NUMBER. Care Coordinator will try to reach patient again in August.    SW mailed an [facility] Letter and 24 Hour Access Plan to pr along with MH resources for easy access.    Becky Conklin [facility]  Clinic Care Coordinator-  Clinics: [place], [place], [place]  E-Mail: [email or web address]  Telephone: [phone]

## 2017-07-31 ENCOUNTER — TELEPHONE (OUTPATIENT)
Dept: FAMILY MEDICINE | Facility: CLINIC | Age: 47
End: 2017-07-31

## 2017-07-31 NOTE — LETTER
[facility]  [address] [place] [address]                              [phone]   July 31, 2017    Bernarda Garrett  [address] [facility] [place] [address]      Dear Bernarda,    I am following up with you regarding your recent office visit to our clinic on 07.26.2017.  I am concerned with how you are doing and I want to make everything is ok.  I have tried to contact you via the phone, but the number we have on file does not seem to be connected.  Please contact the clinic so that we can help you get the medical care you need.            Thank you very much for choosing [facility].     Best regards,        Tiffanie Shannon PA-C    Results for orders placed or performed in visit on 07/26/17   Wet prep   Result Value Ref Range    Specimen Description Vagina     Wet Prep (A)      No yeast seen Clue cells seen  No Trichomonas seen      Micro Report Status FINAL 07/26/2017

## 2017-07-31 NOTE — TELEPHONE ENCOUNTER
Tried calling patient using phone number listed in demographics.  Phone number was not in service.      Wanted to followup with patient regarding her office visit on Wed. 07.26.2017, but I am unable to reach her.  Will have letter sent to home address on file.

## 2017-08-29 ENCOUNTER — CARE COORDINATION (OUTPATIENT)
Dept: CARE COORDINATION | Facility: CLINIC | Age: 47
End: 2017-08-29

## 2017-08-29 NOTE — PROGRESS NOTES
Clinic Care Coordination Contact  [facility]/Voicemail    Referral Source: PCP  Clinical Data: Care Coordinator Outreach  Outreach attempted x 2.  Left message on voicemail with call back information and requested return call.  Plan: Care Coordinator mailed 24 Hour Access Plan to pt 1 month ago, no response, no working telephone. Care Coordinator will do no further outreaches at this time.  Becky Conklin [facility]  Clinic Care Coordinator-  Clinics: [place], [place], [place]  E-Mail: [email or web address]  Telephone: [phone]

## 2018-01-10 ENCOUNTER — HOSPITAL ENCOUNTER (EMERGENCY)
Facility: CLINIC | Age: 48
Discharge: HOME | End: 2018-01-11
Attending: EMERGENCY MEDICINE | Admitting: EMERGENCY MEDICINE
Payer: MEDICARE

## 2018-01-10 DIAGNOSIS — R07.9 ACUTE CHEST PAIN: ICD-10-CM

## 2018-01-10 LAB
ALBUMIN SERPL-MCNC: 3.8 G/DL (ref 3.4–5)
ALP SERPL-CCNC: 49 U/L (ref 40–150)
ALT SERPL W P-5'-P-CCNC: 15 U/L (ref 0–50)
ANION GAP SERPL CALCULATED.3IONS-SCNC: 3 MMOL/L (ref 3–14)
AST SERPL W P-5'-P-CCNC: 14 U/L (ref 0–45)
BASOPHILS # BLD AUTO: 0.1 10E9/L (ref 0–0.2)
BASOPHILS NFR BLD AUTO: 0.8 %
BILIRUB SERPL-MCNC: <0.1 MG/DL (ref 0.2–1.3)
BUN SERPL-MCNC: 15 MG/DL (ref 7–30)
CALCIUM SERPL-MCNC: 8.4 MG/DL (ref 8.5–10.1)
CHLORIDE SERPL-SCNC: 105 MMOL/L (ref 94–109)
CO2 SERPL-SCNC: 30 MMOL/L (ref 20–32)
CREAT SERPL-MCNC: 1.25 MG/DL (ref 0.52–1.04)
DIFFERENTIAL METHOD BLD: ABNORMAL
EOSINOPHIL # BLD AUTO: 0.1 10E9/L (ref 0–0.7)
EOSINOPHIL NFR BLD AUTO: 1.5 %
ERYTHROCYTE [DISTWIDTH] IN BLOOD BY AUTOMATED COUNT: 15.1 % (ref 10–15)
GFR SERPL CREATININE-BSD FRML MDRD: 46 ML/MIN/1.7M2
GLUCOSE SERPL-MCNC: 93 MG/DL (ref 70–99)
HCT VFR BLD AUTO: 38.2 % (ref 35–47)
HGB BLD-MCNC: 12.2 G/DL (ref 11.7–15.7)
IMM GRANULOCYTES # BLD: 0 10E9/L (ref 0–0.4)
IMM GRANULOCYTES NFR BLD: 0.2 %
LYMPHOCYTES # BLD AUTO: 2.6 10E9/L (ref 0.8–5.3)
LYMPHOCYTES NFR BLD AUTO: 43.9 %
MCH RBC QN AUTO: 25.3 PG (ref 26.5–33)
MCHC RBC AUTO-ENTMCNC: 31.9 G/DL (ref 31.5–36.5)
MCV RBC AUTO: 79 FL (ref 78–100)
MONOCYTES # BLD AUTO: 0.5 10E9/L (ref 0–1.3)
MONOCYTES NFR BLD AUTO: 8.4 %
NEUTROPHILS # BLD AUTO: 2.7 10E9/L (ref 1.6–8.3)
NEUTROPHILS NFR BLD AUTO: 45.2 %
NRBC # BLD AUTO: 0 10*3/UL
NRBC BLD AUTO-RTO: 0 /100
PLATELET # BLD AUTO: 294 10E9/L (ref 150–450)
POTASSIUM SERPL-SCNC: 4.1 MMOL/L (ref 3.4–5.3)
PROT SERPL-MCNC: 7.3 G/DL (ref 6.8–8.8)
RBC # BLD AUTO: 4.83 10E12/L (ref 3.8–5.2)
SODIUM SERPL-SCNC: 138 MMOL/L (ref 133–144)
TROPONIN I SERPL-MCNC: <0.015 UG/L (ref 0–0.04)
WBC # BLD AUTO: 6 10E9/L (ref 4–11)

## 2018-01-10 PROCEDURE — 80053 COMPREHEN METABOLIC PANEL: CPT | Performed by: EMERGENCY MEDICINE

## 2018-01-10 PROCEDURE — 93005 ELECTROCARDIOGRAM TRACING: CPT | Mod: 76

## 2018-01-10 PROCEDURE — 93005 ELECTROCARDIOGRAM TRACING: CPT

## 2018-01-10 PROCEDURE — 99285 EMERGENCY DEPT VISIT HI MDM: CPT | Mod: 25

## 2018-01-10 PROCEDURE — 84484 ASSAY OF TROPONIN QUANT: CPT | Performed by: EMERGENCY MEDICINE

## 2018-01-10 PROCEDURE — 85025 COMPLETE CBC W/AUTO DIFF WBC: CPT | Performed by: EMERGENCY MEDICINE

## 2018-01-10 NOTE — ED AVS SNAPSHOT
[facility] Emergency Department    [address]    [facility] [address]    Phone:  [phone]    Fax:  [phone]                                       Bernarda Garrett   MRN: [id number]    Department:  [facility] Emergency Department   Date of Visit:  1/10/2018           Patient Information     Date Of Birth          1970        Your diagnoses for this visit were:     Acute chest pain     Creatinine elevation        You were seen by Francine Danielle MD.      Follow-up Information     Follow up with Tiffanie Shannon PA-C. Schedule an appointment as soon as possible for a visit in 3 days.    Specialty:  Physician Assistant    Why:  for recheck of chest pain symptoms and to discuss outpatient stress test results (also will need Creatinine/kidney function rechecked)    Contact information:    [address] [place] [address]  [phone]          Go to [facility] Emergency Department.    Specialty:  EMERGENCY MEDICINE    Why:  If symptoms worsen    Contact information:    [address]  [facility] [address] [phone]        Discharge Instructions       Discharge Instructions  Chest Pain    You have been seen today for chest pain or discomfort.  At this time, your provider has found no signs that your chest pain is due to a serious or life-threatening condition, (or you have declined more testing and/or admission to the hospital). However, sometimes there is a serious problem that does not show up right away. Your evaluation today may not be complete and you may need further testing and evaluation.     Generally, every Emergency Department visit should have a follow-up clinic visit with either a primary or a specialty clinic/provider. Please follow-up as instructed by your emergency provider today.  Return to the Emergency Department if:    Your chest pain changes, gets worse, starts to happen more often, or comes with  less activity.    You are newly short of breath.    You get very weak or tired.    You pass out or faint.    You have any new symptoms, like fever, cough, numb legs, or you cough up blood.    You have anything else that worries you.    Until you follow-up with your regular provider, please do the following:    Take one aspirin daily unless you have an allergy or are told not to by your provider.    If a stress test appointment has been made, go to the appointment.    If you have questions, contact your regular provider.    Follow-up with your regular provider/clinic as directed; this is very important.    If you were given a prescription for medicine here today, be sure to read all of the information (including the package insert) that comes with your prescription.  This will include important information about the medicine, its side effects, and any warnings that you need to know about.  The pharmacist who fills the prescription can provide more information and answer questions you may have about the medicine.  If you have questions or concerns that the pharmacist cannot address, please call or return to the Emergency Department.       Remember that you can always come back to the Emergency Department if you are not able to see your regular provider in the amount of time listed above, if you get any new symptoms, or if there is anything that worries you.      Discharge Instructions for Chronic Kidney Disease (CKD)  Chronic kidney disease can (CKD) happen because of many things. These include infections, diabetes, high blood pressure, kidney stones, circulation problems, and reactions to medicine. Having kidney disease means making many changes in your life. Learn as much as you can about it so that you can better adjust to these changes. It is important to remember that the main goal of treatment is to stop CKD from progressing to complete kidney failure. Treatments may vary based on the progression of CKD. Always  follow your healthcare provider's instructions on how to manage your condition.  Here are some things you can do to help your condition.  Diet changes  Always discuss your diet with your healthcare provider before making any changes.  Salt (sodium) in your diet    Based on your condition, you may be told to eat 1,500 mg or less of sodium daily    Limit processed foods such as:    Frozen dinners and packaged meals    Canned fish and meats    Pickled foods    Salted snacks    Lunch meats    Sauces    Most cheeses    Fast foods    Don't add salt to your food while cooking or before eating at the table.    Eat unprocessed foods to lower the sodium, such as:    Fresh turkey and chicken    Lean beef    Unsalted tuna    Fresh fish    Fresh vegetables and fruits    Season foods with fresh herbs, garlic, onions, citrus, flavored vinegar, and sodium-free spice blends instead of salt when cooking.    Don't use salt substitutes that are high in potassium. Ask your healthcare provider or a registered dietitian which salt substitutes to use.    Avoiding drinking softened water, because of the sodium content. Make sure to read the label on bottled water for sodium content.    Avoid over-the-counter medicines that contain sodium bicarbonate or sodium carbonate. Read labels carefully.  Potassium in your diet     Based on your condition, you may be told to eat less than 1,500 mg to 2,700 mg of potassium daily.    Always drain canned foods such as vegetables, fruits, and meats before serving.    Avoid whole-grain breads, wheat bran, and granolas.    Avoid milk, buttermilk, and yogurt.    Avoid nuts, seeds, peanut butter, dried beans, and peas.    Avoid fig cookies, chocolate, and molasses.    Don't use salt substitutes that are high in potassium. Ask your healthcare provider or a registered dietitian which salt substitutes to use.  Protein in your diet    Based on your condition, your healthcare provider will talk with you about why  you should limit protein in your diet.    Cut back on protein. Eat less meat, milk products, yogurt, eggs, and cheese.  Phosphorus in your diet    Avoid beer, cocoa, dark pearl, rakel, chocolate drinks, and canned ice teas.    Avoid cheese, milk, ice cream, pudding, and yogurt.    Avoid liver (beef, chicken), organ meats, oysters, crayfish, and sardines.    Avoid beans (soy, kidney, black, garbanzo, and northern), peas (chick and split), bran cereals, nuts, and caramels.  Eat small meals often that are high in fiber and calories. You may be told to limit how much fluid you drink.  Other home care    Avoid wearing yourself out or becoming overly fatigued.    Get plenty of rest and get more sleep at night.    Move around and bend your legs to avoid getting blood clots when you rest for a long period of time.    Weigh yourself every day. Do this at the same time of day and in the same kind of clothes. Keep a record of your daily weights.    Take your medicines exactly as directed.    Keep all medical appointments.    Take steps to control high blood pressure or diabetes. Talk with your healthcare provider for advice.    Talk with your healthcare provider about dialysis. This procedure may help if your chronic kidney disease is progressing to end stage renal disease.  Follow-up care  Follow up with your healthcare provider, or as advised.     When to seek medical care  Call your healthcare provider right away if you have any of the following:    Trouble eating or drinking    Weight loss of more than 2 pounds in 24 hours or more than 5 pounds in 7 days    Little or no urine output    Trouble breathing    Muscle aches    Fever of 100.4 F (38 C) or higher, or as advised by your provider    Blood in your urine or stool    Bloody discharge from your nose, mouth, or ears    Severe headache or a seizure    Vomiting    Swelling of legs or ankles  Call 911  Call 911 if you have chest pain   Date Last Reviewed: 2/1/2017     [id number] [email or web address] [address] [place] [address], [place] [address]. All rights reserved. This information is not intended as a substitute for professional medical care. Always follow your healthcare professional's instructions.              24 Hour Appointment Hotline       To make an appointment at any [facility] clinic, call [phone] ([phone]). If you don't have a family doctor or clinic, we will help you find one. [facility] clinics are conveniently located to serve the needs of you and your family.          ED Discharge Orders     Exercise Stress Echocardiogram       Administration of IV contrast will be tailored to this examination per the appropriate written protocol listed in the Echocardiography department Protocol Book, or by the supervising Cardiologist. This may result in an order change.    Use of contrast is at the discretion of the supervising Cardiologist.                     Review of your medicines      Our records show that you are taking the medicines listed below. If these are incorrect, please call your family doctor or clinic.        Dose / Directions Last dose taken    cetirizine 5 MG tablet   Commonly known as:  zyrTEC   Dose:  5 mg   Quantity:  30 tablet        Take 1 tablet (5 mg) by mouth daily   Refills:  1        cholecalciferol 1000 UNIT tablet   Commonly known as:  vitamin D3   Dose:  1000 Units   Quantity:  100 tablet        Take 1 tablet (1,000 Units) by mouth daily   Refills:  3        EPINEPHrine 0.3 MG/0.3ML injection 2-pack   Commonly known as:  EPIPEN/ADRENACLICK/or ANY BX GENERIC EQUIV   Dose:  0.3 mg   Quantity:  0.6 mL        Inject 0.3 mLs (0.3 mg) into the muscle as needed for anaphylaxis   Refills:  1                Procedures and tests performed during your visit     CBC with platelets differential    Comprehensive metabolic panel    EKG 12 lead    EKG 12-lead, tracing only    Troponin I    Troponin I (now)    XR Chest 2 Views      Orders Needing  Specimen Collection     None      Pending Results     Date and Time Order Name Status Description    1/11/2018 0003 XR Chest 2 Views Preliminary     1/10/2018 2338 EKG 12 lead Preliminary             Pending Culture Results     No orders found for last 3 day(s).            Pending Results Instructions     If you had any lab results that were not finalized at the time of your Discharge, you can call the ED Lab Result RN at [phone]. You will be contacted by this team for any positive Lab results or changes in treatment. The nurses are available 7 days a week from 10A to 6:30P.  You can leave a message 24 hours per day and they will return your call.        Test Results From Your Hospital Stay        1/10/2018 11:27 PM      Component Results     Component Value Ref Range & Units Status    WBC 6.0 4.0 - 11.0 10e9/L Final    RBC Count 4.83 3.8 - 5.2 10e12/L Final    Hemoglobin 12.2 11.7 - 15.7 g/dL Final    Hematocrit 38.2 35.0 - 47.0 % Final    MCV 79 78 - 100 fl Final    MCH 25.3 (L) 26.5 - 33.0 pg Final    MCHC 31.9 31.5 - 36.5 g/dL Final    RDW 15.1 (H) 10.0 - 15.0 % Final    Platelet Count 294 150 - 450 10e9/L Final    Diff Method Automated Method  Final    % Neutrophils 45.2 % Final    % Lymphocytes 43.9 % Final    % Monocytes 8.4 % Final    % Eosinophils 1.5 % Final    % Basophils 0.8 % Final    % Immature Granulocytes 0.2 % Final    Nucleated RBCs 0 0 /100 Final    Absolute Neutrophil 2.7 1.6 - 8.3 10e9/L Final    Absolute Lymphocytes 2.6 0.8 - 5.3 10e9/L Final    Absolute Monocytes 0.5 0.0 - 1.3 10e9/L Final    Absolute Eosinophils 0.1 0.0 - 0.7 10e9/L Final    Absolute Basophils 0.1 0.0 - 0.2 10e9/L Final    Abs Immature Granulocytes 0.0 0 - 0.4 10e9/L Final    Absolute Nucleated RBC 0.0  Final         1/10/2018 11:48 PM      Component Results     Component Value Ref Range & Units Status    Sodium 138 133 - 144 mmol/L Final    Potassium 4.1 3.4 - 5.3 mmol/L Final    Chloride 105 94 - 109 mmol/L Final     Carbon Dioxide 30 20 - 32 mmol/L Final    Anion Gap 3 3 - 14 mmol/L Final    Glucose 93 70 - 99 mg/dL Final    Urea Nitrogen 15 7 - 30 mg/dL Final    Creatinine 1.25 (H) 0.52 - 1.04 mg/dL Final    GFR Estimate 46 (L) >60 mL/min/1.7m2 Final    Non  GFR Calc    GFR Estimate If Black 56 (L) >60 mL/min/1.7m2 Final    African American GFR Calc    Calcium 8.4 (L) 8.5 - 10.1 mg/dL Final    Bilirubin Total <0.1 (L) 0.2 - 1.3 mg/dL Final    Albumin 3.8 3.4 - 5.0 g/dL Final    Protein Total 7.3 6.8 - 8.8 g/dL Final    Alkaline Phosphatase 49 40 - 150 U/L Final    ALT 15 0 - 50 U/L Final    AST 14 0 - 45 U/L Final         1/10/2018 11:48 PM      Component Results     Component Value Ref Range & Units Status    Troponin I ES <0.015 0.000 - 0.045 ug/L Final    The 99th percentile for upper reference range is 0.045 ug/L.  Troponin values   in the range of 0.045 - 0.120 ug/L may be associated with risks of adverse   clinical events.           1/11/2018  1:19 AM      Narrative     CHEST 2 VIEWS  1/11/2018 12:53 AM     HISTORY: Left-sided chest pain. Upper respiratory infection symptoms.    COMPARISON: None.    FINDINGS: The lungs are clear. Normal-sized cardiac silhouette.        Impression     IMPRESSION: No evidence of active cardiopulmonary disease.         1/11/2018  1:28 AM      Component Results     Component Value Ref Range & Units Status    Troponin I ES <0.015 0.000 - 0.045 ug/L Final    The 99th percentile for upper reference range is 0.045 ug/L.  Troponin values   in the range of 0.045 - 0.120 ug/L may be associated with risks of adverse   clinical events.                  Clinical Quality Measure: Blood Pressure Screening     Your blood pressure was checked while you were in the emergency department today. The last reading we obtained was  BP: 120/78 . Please read the guidelines below about what these numbers mean and what you should do about them.  If your systolic blood pressure (the top number) is  "less than 120 and your diastolic blood pressure (the bottom number) is less than 80, then your blood pressure is normal. There is nothing more that you need to do about it.  If your systolic blood pressure (the top number) is 120-139 or your diastolic blood pressure (the bottom number) is 80-89, your blood pressure may be higher than it should be. You should have your blood pressure rechecked within a year by a primary care provider.  If your systolic blood pressure (the top number) is 140 or greater or your diastolic blood pressure (the bottom number) is 90 or greater, you may have high blood pressure. High blood pressure is treatable, but if left untreated over time it can put you at risk for heart attack, stroke, or kidney failure. You should have your blood pressure rechecked by a primary care provider within the next 4 weeks.  If your provider in the emergency department today gave you specific instructions to follow-up with your doctor or provider even sooner than that, you should follow that instruction and not wait for up to 4 weeks for your follow-up visit.        Thank you for choosing [facility]       Thank you for choosing [facility] for your care. Our goal is always to provide you with excellent care. Hearing back from our patients is one way we can continue to improve our services. Please take a few minutes to complete the written survey that you may receive in the mail after you visit with us. Thank you!        [employer] Information     [employer] lets you send messages to your doctor, view your test results, renew your prescriptions, schedule appointments and more. To sign up, go to [email or web address] . Click on \"Log in\" on the left side of the screen, which will take you to the Welcome page. Then click on \"Sign up Now\" on the right side of the page.     You will be asked to enter the access code listed below, as well as some personal information. Please follow the directions to create your username and " password.     Your access code is: [email or web address]  Expires: 2018  1:56 AM     Your access code will  in 90 days. If you need help or a new code, please call your [facility] clinic or [phone].        Care EveryWhere ID     This is your Care EveryWhere ID. This could be used by other organizations to access your [facility] medical records  [id number]        Equal Access to Services     CAROLA MALIK : Kyle garveyo Sopeter, waaxda luqadaha, qaybta kaalmada adeegyada, kev meyer . So [facility] [phone].    ATENCIÓN: Si habla español, tiene a novoa disposición servicios gratuitos de asistencia lingüística. Llame al [phone].    We comply with applicable federal civil rights laws and [place] laws. We do not discriminate on the basis of race, color, national origin, age, disability, sex, sexual orientation, or gender identity.            After Visit Summary       This is your record. Keep this with you and show to your community pharmacist(s) and doctor(s) at your next visit.

## 2018-01-11 ENCOUNTER — APPOINTMENT (OUTPATIENT)
Dept: GENERAL RADIOLOGY | Facility: CLINIC | Age: 48
End: 2018-01-11
Attending: EMERGENCY MEDICINE
Payer: MEDICARE

## 2018-01-11 VITALS
HEART RATE: 87 BPM | DIASTOLIC BLOOD PRESSURE: 78 MMHG | RESPIRATION RATE: 20 BRPM | OXYGEN SATURATION: 100 % | SYSTOLIC BLOOD PRESSURE: 120 MMHG | TEMPERATURE: 99 F

## 2018-01-11 LAB
INTERPRETATION ECG - MUSE: NORMAL
INTERPRETATION ECG - MUSE: NORMAL
TROPONIN I SERPL-MCNC: <0.015 UG/L (ref 0–0.04)

## 2018-01-11 PROCEDURE — 25000128 H RX IP 250 OP 636: Performed by: EMERGENCY MEDICINE

## 2018-01-11 PROCEDURE — 36415 COLL VENOUS BLD VENIPUNCTURE: CPT | Performed by: EMERGENCY MEDICINE

## 2018-01-11 PROCEDURE — 71046 X-RAY EXAM CHEST 2 VIEWS: CPT

## 2018-01-11 PROCEDURE — 84484 ASSAY OF TROPONIN QUANT: CPT | Performed by: EMERGENCY MEDICINE

## 2018-01-11 PROCEDURE — 96374 THER/PROPH/DIAG INJ IV PUSH: CPT

## 2018-01-11 PROCEDURE — A9270 NON-COVERED ITEM OR SERVICE: HCPCS | Mod: GY | Performed by: EMERGENCY MEDICINE

## 2018-01-11 PROCEDURE — 25000132 ZZH RX MED GY IP 250 OP 250 PS 637: Mod: GY | Performed by: EMERGENCY MEDICINE

## 2018-01-11 RX ORDER — NITROGLYCERIN 0.4 MG/1
0.4 TABLET SUBLINGUAL EVERY 5 MIN PRN
Status: DISCONTINUED | OUTPATIENT
Start: 2018-01-11 | End: 2018-01-11 | Stop reason: HOSPADM

## 2018-01-11 RX ORDER — ASPIRIN 81 MG/1
324 TABLET, CHEWABLE ORAL ONCE
Status: COMPLETED | OUTPATIENT
Start: 2018-01-11 | End: 2018-01-11

## 2018-01-11 RX ORDER — KETOROLAC TROMETHAMINE 30 MG/ML
30 INJECTION, SOLUTION INTRAMUSCULAR; INTRAVENOUS ONCE
Status: COMPLETED | OUTPATIENT
Start: 2018-01-11 | End: 2018-01-11

## 2018-01-11 RX ADMIN — ASPIRIN 81 MG 324 MG: 81 TABLET ORAL at 00:08

## 2018-01-11 RX ADMIN — KETOROLAC TROMETHAMINE 30 MG: 30 INJECTION, SOLUTION INTRAMUSCULAR at 00:09

## 2018-01-11 RX ADMIN — NITROGLYCERIN 0.4 MG: 0.4 TABLET SUBLINGUAL at 01:33

## 2018-01-11 ASSESSMENT — ENCOUNTER SYMPTOMS
COUGH: 0
FEVER: 0
VOMITING: 0
SINUS PRESSURE: 1
NAUSEA: 0
ABDOMINAL PAIN: 0
SHORTNESS OF BREATH: 1
ROS GI COMMENTS: NO HEMOPTYSIS

## 2018-01-11 NOTE — DISCHARGE INSTRUCTIONS
Discharge Instructions  Chest Pain    You have been seen today for chest pain or discomfort.  At this time, your provider has found no signs that your chest pain is due to a serious or life-threatening condition, (or you have declined more testing and/or admission to the hospital). However, sometimes there is a serious problem that does not show up right away. Your evaluation today may not be complete and you may need further testing and evaluation.     Generally, every Emergency Department visit should have a follow-up clinic visit with either a primary or a specialty clinic/provider. Please follow-up as instructed by your emergency provider today.  Return to the Emergency Department if:    Your chest pain changes, gets worse, starts to happen more often, or comes with less activity.    You are newly short of breath.    You get very weak or tired.    You pass out or faint.    You have any new symptoms, like fever, cough, numb legs, or you cough up blood.    You have anything else that worries you.    Until you follow-up with your regular provider, please do the following:    Take one aspirin daily unless you have an allergy or are told not to by your provider.    If a stress test appointment has been made, go to the appointment.    If you have questions, contact your regular provider.    Follow-up with your regular provider/clinic as directed; this is very important.    If you were given a prescription for medicine here today, be sure to read all of the information (including the package insert) that comes with your prescription.  This will include important information about the medicine, its side effects, and any warnings that you need to know about.  The pharmacist who fills the prescription can provide more information and answer questions you may have about the medicine.  If you have questions or concerns that the pharmacist cannot address, please call or return to the Emergency Department.       Remember that  you can always come back to the Emergency Department if you are not able to see your regular provider in the amount of time listed above, if you get any new symptoms, or if there is anything that worries you.      Discharge Instructions for Chronic Kidney Disease (CKD)  Chronic kidney disease can (CKD) happen because of many things. These include infections, diabetes, high blood pressure, kidney stones, circulation problems, and reactions to medicine. Having kidney disease means making many changes in your life. Learn as much as you can about it so that you can better adjust to these changes. It is important to remember that the main goal of treatment is to stop CKD from progressing to complete kidney failure. Treatments may vary based on the progression of CKD. Always follow your healthcare provider's instructions on how to manage your condition.  Here are some things you can do to help your condition.  Diet changes  Always discuss your diet with your healthcare provider before making any changes.  Salt (sodium) in your diet    Based on your condition, you may be told to eat 1,500 mg or less of sodium daily    Limit processed foods such as:    Frozen dinners and packaged meals    Canned fish and meats    Pickled foods    Salted snacks    Lunch meats    Sauces    Most cheeses    Fast foods    Don't add salt to your food while cooking or before eating at the table.    Eat unprocessed foods to lower the sodium, such as:    Fresh turkey and chicken    Lean beef    Unsalted tuna    Fresh fish    Fresh vegetables and fruits    Season foods with fresh herbs, garlic, onions, citrus, flavored vinegar, and sodium-free spice blends instead of salt when cooking.    Don't use salt substitutes that are high in potassium. Ask your healthcare provider or a registered dietitian which salt substitutes to use.    Avoiding drinking softened water, because of the sodium content. Make sure to read the label on bottled water for sodium  content.    Avoid over-the-counter medicines that contain sodium bicarbonate or sodium carbonate. Read labels carefully.  Potassium in your diet     Based on your condition, you may be told to eat less than 1,500 mg to 2,700 mg of potassium daily.    Always drain canned foods such as vegetables, fruits, and meats before serving.    Avoid whole-grain breads, wheat bran, and granolas.    Avoid milk, buttermilk, and yogurt.    Avoid nuts, seeds, peanut butter, dried beans, and peas.    Avoid fig cookies, chocolate, and molasses.    Don't use salt substitutes that are high in potassium. Ask your healthcare provider or a registered dietitian which salt substitutes to use.  Protein in your diet    Based on your condition, your healthcare provider will talk with you about why you should limit protein in your diet.    Cut back on protein. Eat less meat, milk products, yogurt, eggs, and cheese.  Phosphorus in your diet    Avoid beer, cocoa, dark pearl, rakel, chocolate drinks, and canned ice teas.    Avoid cheese, milk, ice cream, pudding, and yogurt.    Avoid liver (beef, chicken), organ meats, oysters, crayfish, and sardines.    Avoid beans (soy, kidney, black, garbanzo, and northern), peas (chick and split), bran cereals, nuts, and caramels.  Eat small meals often that are high in fiber and calories. You may be told to limit how much fluid you drink.  Other home care    Avoid wearing yourself out or becoming overly fatigued.    Get plenty of rest and get more sleep at night.    Move around and bend your legs to avoid getting blood clots when you rest for a long period of time.    Weigh yourself every day. Do this at the same time of day and in the same kind of clothes. Keep a record of your daily weights.    Take your medicines exactly as directed.    Keep all medical appointments.    Take steps to control high blood pressure or diabetes. Talk with your healthcare provider for advice.    Talk with your healthcare provider  about dialysis. This procedure may help if your chronic kidney disease is progressing to end stage renal disease.  Follow-up care  Follow up with your healthcare provider, or as advised.     When to seek medical care  Call your healthcare provider right away if you have any of the following:    Trouble eating or drinking    Weight loss of more than 2 pounds in 24 hours or more than 5 pounds in 7 days    Little or no urine output    Trouble breathing    Muscle aches    Fever of 100.4 F (38 C) or higher, or as advised by your provider    Blood in your urine or stool    Bloody discharge from your nose, mouth, or ears    Severe headache or a seizure    Vomiting    Swelling of legs or ankles  Call 911  Call 911 if you have chest pain   Date Last Reviewed: 2/1/2017 2000-2017 [employer]. [address] [place] [address], [place] [address]. All rights reserved. This information is not intended as a substitute for professional medical care. Always follow your healthcare professional's instructions.

## 2018-01-11 NOTE — ED PROVIDER NOTES
History     Chief Complaint:  Chest Pain    HPI   Bernarda Garrett is a 48 year old female with chronic sciatica and costochondritis in the past year who presents to the emergency department for evaluation of chest pain. The patient reports onset of chest pain at about 1900 this morning while sitting at rest. She continues to have pain to her left chest which wraps around her left breast and radiates into her left armpit. The pain is described as being dull, constant pain rated a 7/10 in severity. She has not encountered anything that provokes or palliates the pain, including tylenol #3. The patient cannot recall doing any recent movements or exercises that may have pulled a muscle. She recalls having similar chest pain in the past with several ED visits, but never this severe. The patient additionally complains of shortness of breath, especially when taking a deep breath in. She also has some underlying sinus pressure and ear pain, but states that she gets sinus infections very often around this time of year. She denies abdominal pain, coughing, fevers, nausea, vomiting, leg swelling, hemoptysis, or recent long travels. The patient does not take oral birth control pills. She has never had a stress test done in the past. She has no personal history of hypertension, diabetes, or hyperlipidemia, and no family history of heart attack under the age of 65.    Allergies:  Peanuts [Nuts]  Dilaudid [Hydromorphone]  Minocycline  Latex     Medications:    Zyrtec  Epipen     Past Medical History:    History reviewed. No pertinent past medical history.     Past Surgical History:    Past surgical history reviewed. No pertinent surgical history.    Family History:    Family history reviewed. No pertinent family history.    Social History:  The patient was accompanied to the emergency department by her son.  Smoking Status: Never Smoker  Smokeless Tobacco: Never Used  Alcohol Use: Occasional   Marital Status:      Review of  Systems   Constitutional: Negative for fever.   HENT: Positive for ear pain and sinus pressure.    Respiratory: Positive for shortness of breath. Negative for cough.    Cardiovascular: Positive for chest pain. Negative for leg swelling.   Gastrointestinal: Negative for abdominal pain, nausea and vomiting.        No hemoptysis   Musculoskeletal:        Left armpit pain, left jaw pain   All other systems reviewed and are negative.    Physical Exam     Patient Vitals for the past 24 hrs:   BP Temp Temp src Pulse Heart Rate Resp SpO2   01/11/18 0145 120/78 - - - - - -   01/11/18 0130 134/88 - - - 60 20 100 %   01/11/18 0115 141/83 - - - 62 (!) 45 100 %   01/10/18 2238 (!) 141/93 99  F (37.2  C) Temporal 87 87 18 98 %     Physical Exam  Constitutional: Alert, attentive, GCS 15, middle aged woman in no acute distress  HENT: maxillary sinus tenderness to percussion    Nose: Nasal congestion present   Mouth/Throat: Oropharynx is clear, mucous membranes are moist   Eyes: Normal conjunctiva. Pupils are equal, round, and reactive to light.   CV: regular rate and rhythm; no murmurs, rubs or gallups  Chest: Effort normal and breath sounds normal.  No wheezing, rhonchi, or rales. She has exquisite reproducible chest wall tenderness over her substernal area and left lower rib cage. No crepitus    GI:  There is no tenderness. No distension. Normal bowel sounds  MSK: Normal range of motion, no peripheral edema in her lower extremities, no calf tenderness palpation.   Neurological: Alert, attentive, oriented ×4  Skin: Skin is warm and dry.    Emergency Department Course     ECG 1:  ECG taken at 2224, ECG read at 2353  Normal sinus rhythm  T wave abnormality, consider anterior ischemia  T wave inversion V2-V  Abnormal ECG   Rate 65 bpm. NY interval 154 ms. QRS duration 82 ms. QT/QTc 426/443 ms. P-R-T axes 43 7 60.    ECG 2:  ECG taken at 0015, ECG read at 0019  Sinus bradycardia  Nonspecific T wave abnormality  Abnormal ECG  T wave  inferior inversion resolved from previous EKG   Rate 59 bpm. SC interval 144 ms. QRS duration 84 ms. QT/QTc 432/427 ms. P-R-T axes 33 -8 20.     Imaging:  Radiology findings were communicated with the patient who voiced understanding of the findings.    XR Chest 2 Views  No evidence of active cardiopulmonary disease.  Reading per radiology.     Laboratory:  Laboratory findings were communicated with the patient who voiced understanding of the findings.    Troponin (Collected 0103): <0.015   Troponin (Collected 2300): <0.015   CBC: WBC 6.0, HGB 12.2,   CMP: Creatinine 1.25 (H), GFR Estimate 56 (L), Calcium 8.4 (L), Bilirubin Total <0.1 (L) o/w WNL     Interventions:  0008 Aspirin 324 mg PO  0009 Toradol 30 mg IV  0133 Nitrostat 0.4 mg Sublingual     Emergency Department Course:     Nursing notes and vitals reviewed.     I performed an exam of the patient as documented above.     IV was inserted and blood was drawn for laboratory testing, results above.     The patient was sent for a chest x-ray while in the emergency department, results above.    0123 I updated the patient on her results.     0146 I reevaluated and further updated the patient.     I personally reviewed the laboratory, imaging, and EKG results with the patient and answered all related questions prior to discharge.    Impression & Plan      Medical Decision Making:  Bernarda Garrett is a 48 year old female who presents with chest pain.  The work up in the Emergency Department is negative.  I considered a broad differential diagnosis in this patient such as acute coronary syndrome, myocardial infarction, pulmonary embolism, acute aortic dissection, myocarditis, pericarditis, pneumonia, pneumothorax, chest wall source, pericarditis, pleurisy, esophageal spasm, etc.  No serious etiology for the chest pain were detected today during this visit.  Patient has very reproducible chest wall tenderness along her left sternum and left breast.  There is no  evidence of cellulitis or shingles.  Chest x-ray is normal.  Pulmonary embolism considered, however she is low risk by [place] and is PERC negative. This is most likely costochondritis.  She has had several visits to the emergency department over the last year for chest pain without a clear source, a outpatient stress test would be reasonable.  Exercise stress test was ordered for the patient.  Her  HEART score is 1, low risk, I do not think she needs admission for serial troponins.  I discussed with that she has a mildly elevated creatinine of 1.2 up from 1, and will need for follow-up with primary care.  Return precautions discussed with the patient.  All questions were answered and she feels comfortable with this plan.    Diagnosis:    ICD-10-CM    1. Acute chest pain R07.9 Exercise Stress Echocardiogram   2. Creatinine elevation R79.89      Disposition:   The patient was discharged home.     Scribe Disclosure:  I, Nelia Jones, am serving as a scribe at 11:59 PM on 1/10/2018 to document services personally performed by Francine Danielle MD based on my observations and the provider's statements to me.    [facility] EMERGENCY DEPARTMENT       Francine Danielle MD  01/11/18 5877

## 2018-01-17 ENCOUNTER — HOSPITAL ENCOUNTER (EMERGENCY)
Facility: CLINIC | Age: 48
Discharge: HOME | End: 2018-01-17
Attending: EMERGENCY MEDICINE | Admitting: EMERGENCY MEDICINE

## 2018-01-17 ENCOUNTER — APPOINTMENT (OUTPATIENT)
Dept: CT IMAGING | Facility: CLINIC | Age: 48
End: 2018-01-17
Attending: EMERGENCY MEDICINE

## 2018-01-17 VITALS
SYSTOLIC BLOOD PRESSURE: 140 MMHG | OXYGEN SATURATION: 99 % | HEART RATE: 68 BPM | DIASTOLIC BLOOD PRESSURE: 83 MMHG | RESPIRATION RATE: 16 BRPM | TEMPERATURE: 97.7 F
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
## 2018-01-17 DIAGNOSIS — M54.2 CERVICALGIA: ICD-10-CM
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
## 2018-01-17 PROCEDURE — 99283 EMERGENCY DEPT VISIT LOW MDM: CPT | Mod: Z6 | Performed by: EMERGENCY MEDICINE

## 2018-01-17 PROCEDURE — 25000132 ZZH RX MED GY IP 250 OP 250 PS 637: Performed by: EMERGENCY MEDICINE

## 2018-01-17 PROCEDURE — 72125 CT NECK SPINE W/O DYE: CPT

## 2018-01-17 PROCEDURE — 99284 EMERGENCY DEPT VISIT MOD MDM: CPT | Mod: 25 | Performed by: EMERGENCY MEDICINE

## 2018-01-17 RX ORDER — ACETAMINOPHEN 500 MG
1000 TABLET ORAL ONCE
Status: COMPLETED | OUTPATIENT
Start: 2018-01-17 | End: 2018-01-17

## 2018-01-17 RX ORDER — ACETAMINOPHEN 500 MG
1000 TABLET ORAL EVERY 6 HOURS PRN
Qty: 30 TABLET | Refills: 0 | Status: SHIPPED | OUTPATIENT
Start: 2018-01-17 | End: 2018-01-31

## 2018-01-17 RX ADMIN — ACETAMINOPHEN 1000 MG: 500 TABLET, FILM COATED ORAL at 18:29
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
## 2018-01-17 NOTE — ED AVS SNAPSHOT
[facility], [place], Emergency Department    [address]    [place] [address]    Phone:  [phone]    Fax:  [phone]                                       Bernarda Garrett   MRN: [id number]    Department:  [facility], Emergency Department   Date of Visit:  1/17/2018           After Visit Summary Signature Page     I have received my discharge instructions, and my questions have been answered. I have discussed any challenges I see with this plan with the nurse or doctor.    ..........................................................................................................................................  Patient/Patient Representative Signature      ..........................................................................................................................................  Patient Representative Print Name and Relationship to Patient    ..................................................               ................................................  Date                                            Time    ..........................................................................................................................................  Reviewed by Signature/Title    ...................................................              ..............................................  Date                                                            Time
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
## 2018-01-19 ENCOUNTER — TELEPHONE (OUTPATIENT)
Dept: FAMILY MEDICINE | Facility: CLINIC | Age: 48
End: 2018-01-19

## 2018-01-19 NOTE — TELEPHONE ENCOUNTER
Date Forms was received: January 19, 2018    Forms received by: Fax    Last office visit: 07/26/2017    Purpose of Form:  Incontinence supple order Prescription    When the form is due:  asap    How the form needs to be returned for patient:  Fax - [phone]    Form currently placed  North sandhya Moreno's desk - Bin on R side

## 2018-01-26 NOTE — TELEPHONE ENCOUNTER
Forms for incontinence supplies received in clinic for this patient.  I do not have a diagnosis that supports these supplies.  Please call patient to ask about the necessity and have her make an appointment to discuss.      Thank you.

## 2018-01-26 NOTE — TELEPHONE ENCOUNTER
Patient notified by phone.  Appointment scheduled for 2/2/2018.    Cely Palma, BS, RN, PHN  [facility]) [phone]

## 2018-01-27 ASSESSMENT — ENCOUNTER SYMPTOMS
FEVER: 0
SHORTNESS OF BREATH: 0
ABDOMINAL PAIN: 0
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
## 2018-01-30 ENCOUNTER — APPOINTMENT (OUTPATIENT)
Dept: GENERAL RADIOLOGY | Facility: CLINIC | Age: 48
End: 2018-01-30
Attending: EMERGENCY MEDICINE
Payer: MEDICARE

## 2018-01-30 ENCOUNTER — HOSPITAL ENCOUNTER (EMERGENCY)
Facility: CLINIC | Age: 48
Discharge: HOME | End: 2018-01-31
Attending: EMERGENCY MEDICINE | Admitting: EMERGENCY MEDICINE
Payer: MEDICARE

## 2018-01-30 DIAGNOSIS — R07.9 ACUTE CHEST PAIN: ICD-10-CM

## 2018-01-30 DIAGNOSIS — M79.10 MUSCLE ACHE: ICD-10-CM

## 2018-01-30 DIAGNOSIS — J34.89 SINUS DRAINAGE: ICD-10-CM

## 2018-01-30 LAB
ALBUMIN UR-MCNC: NEGATIVE MG/DL
ANION GAP SERPL CALCULATED.3IONS-SCNC: 10 MMOL/L (ref 3–14)
APPEARANCE UR: CLEAR
BILIRUB UR QL STRIP: NEGATIVE
BUN SERPL-MCNC: 11 MG/DL (ref 7–30)
CALCIUM SERPL-MCNC: 8.1 MG/DL (ref 8.5–10.1)
CHLORIDE SERPL-SCNC: 108 MMOL/L (ref 94–109)
CO2 SERPL-SCNC: 23 MMOL/L (ref 20–32)
COLOR UR AUTO: YELLOW
CREAT SERPL-MCNC: 0.87 MG/DL (ref 0.52–1.04)
ERYTHROCYTE [DISTWIDTH] IN BLOOD BY AUTOMATED COUNT: 14.5 % (ref 10–15)
GFR SERPL CREATININE-BSD FRML MDRD: 70 ML/MIN/1.7M2
GLUCOSE SERPL-MCNC: 100 MG/DL (ref 70–99)
GLUCOSE UR STRIP-MCNC: NEGATIVE MG/DL
HCG SERPL QL: NEGATIVE
HCT VFR BLD AUTO: 40.5 % (ref 35–47)
HGB BLD-MCNC: 13 G/DL (ref 11.7–15.7)
HGB UR QL STRIP: NEGATIVE
KETONES UR STRIP-MCNC: NEGATIVE MG/DL
LEUKOCYTE ESTERASE UR QL STRIP: NEGATIVE
MCH RBC QN AUTO: 24.8 PG (ref 26.5–33)
MCHC RBC AUTO-ENTMCNC: 32.1 G/DL (ref 31.5–36.5)
MCV RBC AUTO: 77 FL (ref 78–100)
NITRATE UR QL: NEGATIVE
PH UR STRIP: 7 PH (ref 5–7)
PLATELET # BLD AUTO: 355 10E9/L (ref 150–450)
POTASSIUM SERPL-SCNC: 3.9 MMOL/L (ref 3.4–5.3)
RBC # BLD AUTO: 5.24 10E12/L (ref 3.8–5.2)
SODIUM SERPL-SCNC: 141 MMOL/L (ref 133–144)
SOURCE: NORMAL
SP GR UR STRIP: 1.01 (ref 1–1.03)
TROPONIN I SERPL-MCNC: <0.015 UG/L (ref 0–0.04)
TROPONIN I SERPL-MCNC: <0.015 UG/L (ref 0–0.04)
UROBILINOGEN UR STRIP-MCNC: 2 MG/DL (ref 0–2)
WBC # BLD AUTO: 5.1 10E9/L (ref 4–11)

## 2018-01-30 PROCEDURE — 81003 URINALYSIS AUTO W/O SCOPE: CPT | Performed by: EMERGENCY MEDICINE

## 2018-01-30 PROCEDURE — 84703 CHORIONIC GONADOTROPIN ASSAY: CPT | Performed by: EMERGENCY MEDICINE

## 2018-01-30 PROCEDURE — 93005 ELECTROCARDIOGRAM TRACING: CPT

## 2018-01-30 PROCEDURE — A9270 NON-COVERED ITEM OR SERVICE: HCPCS | Mod: GY | Performed by: EMERGENCY MEDICINE

## 2018-01-30 PROCEDURE — 99285 EMERGENCY DEPT VISIT HI MDM: CPT | Mod: 25

## 2018-01-30 PROCEDURE — 25000132 ZZH RX MED GY IP 250 OP 250 PS 637: Mod: GY | Performed by: EMERGENCY MEDICINE

## 2018-01-30 PROCEDURE — 25000128 H RX IP 250 OP 636: Performed by: EMERGENCY MEDICINE

## 2018-01-30 PROCEDURE — 96374 THER/PROPH/DIAG INJ IV PUSH: CPT

## 2018-01-30 PROCEDURE — 71046 X-RAY EXAM CHEST 2 VIEWS: CPT

## 2018-01-30 PROCEDURE — 80048 BASIC METABOLIC PNL TOTAL CA: CPT | Performed by: EMERGENCY MEDICINE

## 2018-01-30 PROCEDURE — 84484 ASSAY OF TROPONIN QUANT: CPT | Performed by: EMERGENCY MEDICINE

## 2018-01-30 PROCEDURE — 84484 ASSAY OF TROPONIN QUANT: CPT | Mod: 91 | Performed by: EMERGENCY MEDICINE

## 2018-01-30 PROCEDURE — 85027 COMPLETE CBC AUTOMATED: CPT | Performed by: EMERGENCY MEDICINE

## 2018-01-30 PROCEDURE — 96361 HYDRATE IV INFUSION ADD-ON: CPT

## 2018-01-30 RX ORDER — ACETAMINOPHEN 325 MG/1
975 TABLET ORAL ONCE
Status: COMPLETED | OUTPATIENT
Start: 2018-01-30 | End: 2018-01-30

## 2018-01-30 RX ORDER — KETOROLAC TROMETHAMINE 30 MG/ML
30 INJECTION, SOLUTION INTRAMUSCULAR; INTRAVENOUS ONCE
Status: COMPLETED | OUTPATIENT
Start: 2018-01-30 | End: 2018-01-30

## 2018-01-30 RX ORDER — SODIUM CHLORIDE 9 MG/ML
1000 INJECTION, SOLUTION INTRAVENOUS CONTINUOUS
Status: DISCONTINUED | OUTPATIENT
Start: 2018-01-30 | End: 2018-01-31 | Stop reason: HOSPADM

## 2018-01-30 RX ORDER — LIDOCAINE 4 G/G
1 PATCH TOPICAL ONCE
Status: DISCONTINUED | OUTPATIENT
Start: 2018-01-30 | End: 2018-01-31 | Stop reason: HOSPADM

## 2018-01-30 RX ADMIN — SODIUM CHLORIDE 1000 ML: 9 INJECTION, SOLUTION INTRAVENOUS at 20:29

## 2018-01-30 RX ADMIN — ACETAMINOPHEN 975 MG: 325 TABLET, FILM COATED ORAL at 23:08

## 2018-01-30 RX ADMIN — KETOROLAC TROMETHAMINE 30 MG: 30 INJECTION, SOLUTION INTRAMUSCULAR at 20:29

## 2018-01-30 RX ADMIN — LIDOCAINE 1 PATCH: 560 PATCH PERCUTANEOUS; TOPICAL; TRANSDERMAL at 23:08

## 2018-01-30 ASSESSMENT — ENCOUNTER SYMPTOMS
DIARRHEA: 0
SORE THROAT: 1
SHORTNESS OF BREATH: 0
HEADACHES: 1
FEVER: 0
NECK PAIN: 1
RHINORRHEA: 1

## 2018-01-30 NOTE — ED AVS SNAPSHOT
[facility] Emergency Department    [address]    [facility] [address]    Phone:  [phone]    Fax:  [phone]                                       Bernarda Garrett   MRN: [id number]    Department:  [facility] Emergency Department   Date of Visit:  1/30/2018           After Visit Summary Signature Page     I have received my discharge instructions, and my questions have been answered. I have discussed any challenges I see with this plan with the nurse or doctor.    ..........................................................................................................................................  Patient/Patient Representative Signature      ..........................................................................................................................................  Patient Representative Print Name and Relationship to Patient    ..................................................               ................................................  Date                                            Time    ..........................................................................................................................................  Reviewed by Signature/Title    ...................................................              ..............................................  Date                                                            Time

## 2018-01-30 NOTE — ED AVS SNAPSHOT
[facility] Emergency Department    [address] [place] [address]    Phone:  [phone]    Fax:  [phone]                                       Bernarda Garrett   MRN: [id number]    Department:  [facility] Emergency Department   Date of Visit:  1/30/2018           Patient Information     Date Of Birth          1970        Your diagnoses for this visit were:     Acute chest pain     Sinus drainage     Muscle ache        You were seen by Jazz Bustillo MD.      Follow-up Information     Follow up with Tiffanie Shannon PA-C. Schedule an appointment as soon as possible for a visit in 3 days.    Specialty:  Physician Assistant    Contact information:    [address] [place] [id number] [phone]          Discharge Instructions       Please return to the ED if you have active chest pain, shortness of breath, nausea, sweatiness, or other acute changes.  Please follow up with your PCP in the next 2-3 days.      Discharge Instructions  Sinus Infection    You have acute sinusitis, or an infection of the sinuses. The sinuses are the hollow areas within the facial bones that are connected to the nasal opening. The most common cause of acute sinusitis is a virus infection associated with the common cold. Bacterial sinusitis occurs much less commonly, usually as a complication of viral sinusitis. Experts say that most sinusitis is caused by a virus within the first 7-10 days of illness. Antibiotics do nothing to help with virus infections, so most people do not need antibiotics for acute sinusitis.     Generally, every Emergency Department visit should have a follow-up clinic visit with either a primary or a specialty clinic/provider. Please follow-up as instructed by your emergency provider today.    Return to the Emergency Department if:    Your vision changes.    You are confused or have difficulty thinking clearly.    You have swelling around your  eye.    You develop a severe headache or neck stiffness.    Your symptoms get worse and you are unable to see your primary provider.      Treatment:    Pain relief -- Non-prescription pain medications, such as Tylenol  (acetaminophen) or Motrin  or Advil  (ibuprofen) are recommended for pain.  Do not use a medicine that you are allergic to, or if your provider has told you not to use it.       Nasal irrigation -- Flushing the nose and sinuses with a saline solution several times per day can help to decrease pain caused by congestion.    Nasal decongestants -- Nasal decongestant sprays, including Afrin  (oxymetazoline) and Clifford-Synephrine  (phenylephrine) can be used to temporarily treat congestion. However, these sprays should not be used for more than two to three days due to the risk of rebound congestion (when the nose is congested constantly unless the medication is used repeatedly).    Nasal glucocorticoids -- These are prescription steroids delivered by a nasal spray that can help to reduce swelling inside the nose, usually within two to three days. These drugs have few side effects and dramatically relieve symptoms in most people.  If you use these in conjunction with Afrin  you will need to use at least 15 minutes prior to the nasal decongestant.      Antibiotic? -- Rarely antibiotics are used along with the above treatments.    If you were given a prescription for medicine here today, be sure to read all of the information (including the package insert) that comes with your prescription.  This will include important information about the medicine, its side effects, and any warnings that you need to know about.  The pharmacist who fills the prescription can provide more information and answer questions you may have about the medicine.  If you have questions or concerns that the pharmacist cannot address, please call or return to the Emergency Department.   Remember that you can always come back to the  Emergency Department if you are not able to see your regular provider in the amount of time listed above, if you get any new symptoms, or if there is anything that worries you.    Discharge Instructions  Chest Pain    You have been seen today for chest pain or discomfort.  At this time, your provider has found no signs that your chest pain is due to a serious or life-threatening condition, (or you have declined more testing and/or admission to the hospital). However, sometimes there is a serious problem that does not show up right away. Your evaluation today may not be complete and you may need further testing and evaluation.     Generally, every Emergency Department visit should have a follow-up clinic visit with either a primary or a specialty clinic/provider. Please follow-up as instructed by your emergency provider today.  Return to the Emergency Department if:    Your chest pain changes, gets worse, starts to happen more often, or comes with less activity.    You are newly short of breath.    You get very weak or tired.    You pass out or faint.    You have any new symptoms, like fever, cough, numb legs, or you cough up blood.    You have anything else that worries you.    Until you follow-up with your regular provider, please do the following:    Take one aspirin daily unless you have an allergy or are told not to by your provider.    If a stress test appointment has been made, go to the appointment.    If you have questions, contact your regular provider.    Follow-up with your regular provider/clinic as directed; this is very important.    If you were given a prescription for medicine here today, be sure to read all of the information (including the package insert) that comes with your prescription.  This will include important information about the medicine, its side effects, and any warnings that you need to know about.  The pharmacist who fills the prescription can provide more information and answer  questions you may have about the medicine.  If you have questions or concerns that the pharmacist cannot address, please call or return to the Emergency Department.       Remember that you can always come back to the Emergency Department if you are not able to see your regular provider in the amount of time listed above, if you get any new symptoms, or if there is anything that worries you.      Future Appointments        Provider Department Dept Phone Center    2/2/2018 10:20 AM Tiffanie Shannon PA-C [facility] [phone]       24 Hour Appointment Hotline       To make an appointment at any [facility], call [phone] ([phone]). If you don't have a family doctor or clinic, we will help you find one. [facility] are conveniently located to serve the needs of you and your family.          ED Discharge Orders     Exercise Stress Echocardiogram       Administration of IV contrast will be tailored to this examination per the appropriate written protocol listed in the Echocardiography department Protocol Book, or by the supervising Cardiologist. This may result in an order change.    Use of contrast is at the discretion of the supervising Cardiologist.                     Review of your medicines      START taking        Dose / Directions Last dose taken    lidocaine 5 % Patch   Commonly known as:  LIDODERM   Dose:  1 patch   Quantity:  10 patch        Place 1 patch onto the skin every 24 hours for 10 days   Refills:  0        sodium chloride-sodium bicarb 2300-700 MG Kit   Commonly known as:  CLASSIC NETI POT SINUS WASH   Dose:  1 packet   Quantity:  1 kit        Spray 1 packet in nostril daily   Refills:  0          Our records show that you are taking the medicines listed below. If these are incorrect, please call your family doctor or clinic.        Dose / Directions Last dose taken    acetaminophen 500 MG tablet   Commonly known as:  TYLENOL   Dose:  1000 mg   Quantity:   30 tablet        Take 2 tablets (1,000 mg) by mouth every 6 hours as needed for mild pain   Refills:  0        cetirizine 5 MG tablet   Commonly known as:  zyrTEC   Dose:  5 mg   Quantity:  30 tablet        Take 1 tablet (5 mg) by mouth daily   Refills:  1        cholecalciferol 1000 UNIT tablet   Commonly known as:  vitamin D3   Dose:  1000 Units   Quantity:  100 tablet        Take 1 tablet (1,000 Units) by mouth daily   Refills:  3        EPINEPHrine 0.3 MG/0.3ML injection 2-pack   Commonly known as:  EPIPEN/ADRENACLICK/or ANY BX GENERIC EQUIV   Dose:  0.3 mg   Quantity:  0.6 mL        Inject 0.3 mLs (0.3 mg) into the muscle as needed for anaphylaxis   Refills:  1        UNKNOWN TO PATIENT        Antibiotic for bacteria vaginal infection   Refills:  0                Prescriptions were sent or printed at these locations (2 Prescriptions)                   Other Prescriptions                Printed at Department/Unit printer (2 of 2)         lidocaine (LIDODERM) 5 % Patch               sodium chloride-sodium bicarb (CLASSIC NETI POT SINUS WASH) 2300-700 MG KIT                Procedures and tests performed during your visit     Basic metabolic panel (BMP)    CBC (platelets, no diff)    EKG 12-lead, tracing only    HCG QUALitative pregnancy (blood)    Troponin I    Troponin I (now)    UA reflex to Microscopic and Culture    XR Chest 2 Views      Orders Needing Specimen Collection     None      Pending Results     Date and Time Order Name Status Description    1/30/2018 1827 EKG 12-lead, tracing only Preliminary             Pending Culture Results     No orders found for last 3 day(s).            Pending Results Instructions     If you had any lab results that were not finalized at the time of your Discharge, you can call the ED Lab Result RN at [phone]. You will be contacted by this team for any positive Lab results or changes in treatment. The nurses are available 7 days a week from 10A to 6:30P.  You can leave  a message 24 hours per day and they will return your call.        Test Results From Your Hospital Stay        1/30/2018  8:50 PM      Component Results     Component Value Ref Range & Units Status    WBC 5.1 4.0 - 11.0 10e9/L Final    RBC Count 5.24 (H) 3.8 - 5.2 10e12/L Final    Hemoglobin 13.0 11.7 - 15.7 g/dL Final    Hematocrit 40.5 35.0 - 47.0 % Final    MCV 77 (L) 78 - 100 fl Final    MCH 24.8 (L) 26.5 - 33.0 pg Final    MCHC 32.1 31.5 - 36.5 g/dL Final    RDW 14.5 10.0 - 15.0 % Final    Platelet Count 355 150 - 450 10e9/L Final         1/30/2018  9:12 PM      Component Results     Component Value Ref Range & Units Status    Sodium 141 133 - 144 mmol/L Final    Potassium 3.9 3.4 - 5.3 mmol/L Final    Chloride 108 94 - 109 mmol/L Final    Carbon Dioxide 23 20 - 32 mmol/L Final    Anion Gap 10 3 - 14 mmol/L Final    Glucose 100 (H) 70 - 99 mg/dL Final    Urea Nitrogen 11 7 - 30 mg/dL Final    Creatinine 0.87 0.52 - 1.04 mg/dL Final    GFR Estimate 70 >60 mL/min/1.7m2 Final    Non  GFR Calc    GFR Estimate If Black 85 >60 mL/min/1.7m2 Final    African American GFR Calc    Calcium 8.1 (L) 8.5 - 10.1 mg/dL Final         1/30/2018  9:12 PM      Component Results     Component Value Ref Range & Units Status    Troponin I ES <0.015 0.000 - 0.045 ug/L Final    The 99th percentile for upper reference range is 0.045 ug/L.  Troponin values   in the range of 0.045 - 0.120 ug/L may be associated with risks of adverse   clinical events.           1/30/2018  9:21 PM      Component Results     Component Value Ref Range & Units Status    HCG Qualitative Serum Negative NEG^Negative Final    This test is for screening purposes.  Results should be interpreted along with   the clinical picture.  Confirmation testing is available if warranted by   ordering [id number], HCG Quantitative Pregnancy.           1/30/2018 11:28 PM      Narrative     CHEST TWO VIEWS   1/30/2018 9:14 PM     HISTORY: Cough.    COMPARISON:  1/11/2018    FINDINGS: The lungs are clear. Normal-sized cardiac silhouette.        Impression     IMPRESSION: No evidence of active cardiopulmonary disease.    MYNOR BARRETT MD         1/30/2018 10:48 PM      Component Results     Component Value Ref Range & Units Status    Color Urine Yellow  Final    Appearance Urine Clear  Final    Glucose Urine Negative NEG^Negative mg/dL Final    Bilirubin Urine Negative NEG^Negative Final    Ketones Urine Negative NEG^Negative mg/dL Final    Specific Gravity Urine 1.015 1.003 - 1.035 Final    Blood Urine Negative NEG^Negative Final    pH Urine 7.0 5.0 - 7.0 pH Final    Protein Albumin Urine Negative NEG^Negative mg/dL Final    Urobilinogen mg/dL 2.0 0.0 - 2.0 mg/dL Final    Nitrite Urine Negative NEG^Negative Final    Leukocyte Esterase Urine Negative NEG^Negative Final    Source Midstream Urine  Final         1/30/2018 11:53 PM      Component Results     Component Value Ref Range & Units Status    Troponin I ES <0.015 0.000 - 0.045 ug/L Final    The 99th percentile for upper reference range is 0.045 ug/L.  Troponin values   in the range of 0.045 - 0.120 ug/L may be associated with risks of adverse   clinical events.                  Clinical Quality Measure: Blood Pressure Screening     Your blood pressure was checked while you were in the emergency department today. The last reading we obtained was  BP: (!) 146/96 . Please read the guidelines below about what these numbers mean and what you should do about them.  If your systolic blood pressure (the top number) is less than 120 and your diastolic blood pressure (the bottom number) is less than 80, then your blood pressure is normal. There is nothing more that you need to do about it.  If your systolic blood pressure (the top number) is 120-139 or your diastolic blood pressure (the bottom number) is 80-89, your blood pressure may be higher than it should be. You should have your blood pressure rechecked within a year by a  "primary care provider.  If your systolic blood pressure (the top number) is 140 or greater or your diastolic blood pressure (the bottom number) is 90 or greater, you may have high blood pressure. High blood pressure is treatable, but if left untreated over time it can put you at risk for heart attack, stroke, or kidney failure. You should have your blood pressure rechecked by a primary care provider within the next 4 weeks.  If your provider in the emergency department today gave you specific instructions to follow-up with your doctor or provider even sooner than that, you should follow that instruction and not wait for up to 4 weeks for your follow-up visit.        Thank you for choosing [facility]       Thank you for choosing [facility] for your care. Our goal is always to provide you with excellent care. Hearing back from our patients is one way we can continue to improve our services. Please take a few minutes to complete the written survey that you may receive in the mail after you visit with us. Thank you!        [facility] Information     [employer] lets you send messages to your doctor, view your test results, renew your prescriptions, schedule appointments and more. To sign up, go to [email or web address] . Click on \"Log in\" on the left side of the screen, which will take you to the Welcome page. Then click on \"Sign up Now\" on the right side of the page.     You will be asked to enter the access code listed below, as well as some personal information. Please follow the directions to create your username and password.     Your access code is: [email or web address]  Expires: 2018  1:56 AM     Your access code will  in 90 days. If you need help or a new code, please call your [facility] clinic or [phone].        Care EveryWhere ID     This is your Care EveryWhere ID. This could be used by other organizations to access your [facility] medical records  [id number]        Equal Access to Services     CAROLA MALIK AH: " Hadii graham Arrieta, waorvilleda hudson, qabriata kaalkev peterson. So [facility] [phone].    ATENCIÓN: Si habla español, tiene a novoa disposición servicios gratuitos de asistencia lingüística. Llame al [phone].    We comply with applicable federal civil rights laws and [place] laws. We do not discriminate on the basis of race, color, national origin, age, disability, sex, sexual orientation, or gender identity.            After Visit Summary       This is your record. Keep this with you and show to your community pharmacist(s) and doctor(s) at your next visit.

## 2018-01-30 NOTE — TELEPHONE ENCOUNTER
[facility]  calling to update us that patient advised her she is having stress incontinence when she sneezes.  Patient is scheduled for office visit on 2/2/2018.    KATERINAI to ORTIZ for upcoming appointment.    STARR Milton, RN, N  [facility]) [phone]

## 2018-01-31 ENCOUNTER — OFFICE VISIT (OUTPATIENT)
Dept: FAMILY MEDICINE | Facility: CLINIC | Age: 48
End: 2018-01-31
Payer: MEDICARE

## 2018-01-31 VITALS
BODY MASS INDEX: 28.32 KG/M2 | WEIGHT: 170 LBS | HEART RATE: 82 BPM | DIASTOLIC BLOOD PRESSURE: 86 MMHG | SYSTOLIC BLOOD PRESSURE: 138 MMHG | HEIGHT: 65 IN | RESPIRATION RATE: 18 BRPM | TEMPERATURE: 99 F | OXYGEN SATURATION: 100 %

## 2018-01-31 VITALS
HEIGHT: 65 IN | TEMPERATURE: 98 F | BODY MASS INDEX: 29.49 KG/M2 | WEIGHT: 177 LBS | DIASTOLIC BLOOD PRESSURE: 72 MMHG | HEART RATE: 86 BPM | OXYGEN SATURATION: 97 % | SYSTOLIC BLOOD PRESSURE: 138 MMHG

## 2018-01-31 DIAGNOSIS — J30.2 SEASONAL ALLERGIC RHINITIS, UNSPECIFIED CHRONICITY, UNSPECIFIED TRIGGER: ICD-10-CM

## 2018-01-31 DIAGNOSIS — J01.00 ACUTE NON-RECURRENT MAXILLARY SINUSITIS: ICD-10-CM

## 2018-01-31 DIAGNOSIS — R07.89 CHEST WALL PAIN: Primary | ICD-10-CM

## 2018-01-31 DIAGNOSIS — Z91.010 PEANUT ALLERGY: ICD-10-CM

## 2018-01-31 LAB — INTERPRETATION ECG - MUSE: NORMAL

## 2018-01-31 PROCEDURE — 99214 OFFICE O/P EST MOD 30 MIN: CPT | Performed by: PHYSICIAN ASSISTANT

## 2018-01-31 RX ORDER — CETIRIZINE HYDROCHLORIDE 5 MG/1
5 TABLET ORAL DAILY
Qty: 30 TABLET | Refills: 1 | Status: SHIPPED | OUTPATIENT
Start: 2018-01-31 | End: 2018-06-03

## 2018-01-31 RX ORDER — EPINEPHRINE 0.3 MG/.3ML
0.3 INJECTION SUBCUTANEOUS PRN
Qty: 0.6 ML | Refills: 1 | Status: SHIPPED | OUTPATIENT
Start: 2018-01-31 | End: 2018-11-28

## 2018-01-31 RX ORDER — ACETAMINOPHEN 500 MG
1000 TABLET ORAL EVERY 8 HOURS PRN
Qty: 30 TABLET | Refills: 0 | Status: SHIPPED | OUTPATIENT
Start: 2018-01-31 | End: 2018-06-05

## 2018-01-31 RX ORDER — LIDOCAINE 50 MG/G
1 PATCH TOPICAL EVERY 24 HOURS
Qty: 10 PATCH | Refills: 0 | Status: SHIPPED | OUTPATIENT
Start: 2018-01-31 | End: 2018-02-09

## 2018-01-31 NOTE — NURSING NOTE
"Chief Complaint   Patient presents with     ER F/U       Initial /72 (BP Location: Left arm, Patient Position: Chair, Cuff Size: Adult Large)  Pulse 86  Temp 98  F (36.7  C) (Oral)  Ht 5' 5\" (1.651 m)  Wt 177 lb (80.3 kg)  LMP 01/10/2018  SpO2 97%  Breastfeeding? No  BMI 29.45 kg/m2 Estimated body mass index is 29.45 kg/(m^2) as calculated from the following:    Height as of this encounter: 5' 5\" (1.651 m).    Weight as of this encounter: 177 lb (80.3 kg).  Medication Reconciliation: complete   Csaba Mlnarik CMA    "
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
## 2018-01-31 NOTE — MR AVS SNAPSHOT
After Visit Summary   1/31/2018    Bernarda Garrett    MRN: [id number]           Patient Information     Date Of Birth          1970        Visit Information        Provider Department      1/31/2018 1:40 PM Tiffanie Shannon PA-C [facility]        Today's Diagnoses     Chest wall pain    -  1    Peanut allergy        Seasonal allergic rhinitis, unspecified chronicity, unspecified trigger        Acute non-recurrent maxillary sinusitis          Care Instructions      Sinusitis (Antibiotic Treatment)    The sinuses are air-filled spaces within the bones of the face. They connect to the inside of the nose. Sinusitis is an inflammation of the tissue lining the sinus cavity. Sinus inflammation can occur during a cold. It can also be due to allergies to pollens and other particles in the air. Sinusitis can cause symptoms of sinus congestion and fullness. A sinus infection causes fever, headache and facial pain. There is often green or yellow drainage from the nose or into the back of the throat (post-nasal drip). You have been given antibiotics to treat this condition.  Home care:    Take the full course of antibiotics as instructed. Do not stop taking them, even if you feel better.    Drink plenty of water, hot tea, and other liquids. This may help thin mucus. It also may promote sinus drainage.    Heat may help soothe painful areas of the face. Use a towel soaked in hot water. Or,  the shower and direct the hot spray onto your face. Using a vaporizer along with a menthol rub at night may also help.     An expectorant containing guaifenesin may help thin the mucus and promote drainage from the sinuses.    Over-the-counter decongestants may be used unless a similar medicine was prescribed. Nasal sprays work the fastest. Use one that contains phenylephrine or oxymetazoline. First blow the nose gently. Then use the spray. Do not use these medicines more often than directed on the label  or symptoms may get worse. You may also use tablets containing pseudoephedrine. Avoid products that combine ingredients, because side effects may be increased. Read labels. You can also ask the pharmacist for help. (NOTE: Persons with high blood pressure should not use decongestants. They can raise blood pressure.)    Over-the-counter antihistamines may help if allergies contributed to your sinusitis.      Do not use nasal rinses or irrigation during an acute sinus infection, unless told to by your health care provider. Rinsing may spread the infection to other sinuses.    Use acetaminophen or ibuprofen to control pain, unless another pain medicine was prescribed. (If you have chronic liver or kidney disease or ever had a stomach ulcer, talk with your doctor before using these medicines. Aspirin should never be used in anyone under 18 years of age who is ill with a fever. It may cause severe liver damage.)    Don't smoke. This can worsen symptoms.  Follow-up care  Follow up with your healthcare provider or our staff if you are not improving within the next week.  When to seek medical advice  Call your healthcare provider if any of these occur:    Facial pain or headache becoming more severe    Stiff neck    Unusual drowsiness or confusion    Swelling of the forehead or eyelids    Vision problems, including blurred or double vision    Fever of 100.4 F (38 C) or higher, or as directed by your healthcare provider    Seizure    Breathing problems    Symptoms not resolving within 10 days  Date Last Reviewed: 4/13/2015 2000-2017 [employer]. [address] [place], [place] [id number]. All rights reserved. This information is not intended as a substitute for professional medical care. Always follow your healthcare professional's instructions.                Follow-ups after your visit        Your next 10 appointments already scheduled     Feb 02, 2018 10:20 AM CST   SHORT with Tiffanie Shannon PA-C  "  [facility]    [address] [place] [id number] [phone]              Future tests that were ordered for you today     Open Future Orders        Priority Expected Expires Ordered    Exercise Stress Echocardiogram ASA  2018            Who to contact     If you have questions or need follow up information about today's clinic visit or your schedule please contact [facility] directly at [phone].  Normal or non-critical lab and imaging results will be communicated to you by Trendienthart, letter or phone within 4 business days after the clinic has received the results. If you do not hear from us within 7 days, please contact the clinic through Trendienthart or phone. If you have a critical or abnormal lab result, we will notify you by phone as soon as possible.  Submit refill requests through [email or web address] or call your pharmacy and they will forward the refill request to us. Please allow 3 business days for your refill to be completed.          Additional Information About Your Visit        [email or web address] Information     [email or web address] lets you send messages to your doctor, view your test results, renew your prescriptions, schedule appointments and more. To sign up, go to [email or web address] . Click on \"Log in\" on the left side of the screen, which will take you to the Welcome page. Then click on \"Sign up Now\" on the right side of the page.     You will be asked to enter the access code listed below, as well as some personal information. Please follow the directions to create your username and password.     Your access code is: [email or web address]  Expires: 2018  1:56 AM     Your access code will  in 90 days. If you need help or a new code, please call your [facility] clinic or [phone].        Care EveryWhere ID     This is your Care EveryWhere ID. This could be used by other organizations to access your [facility] medical " "records  [id number]        Your Vitals Were     Pulse Temperature Height Last Period Pulse Oximetry Breastfeeding?    86 98  F (36.7  C) (Oral) 5' 5\" (1.651 m) 01/10/2018 97% No    BMI (Body Mass Index)                   29.45 kg/m2            Blood Pressure from Last 3 Encounters:   01/31/18 138/72   01/31/18 138/86   01/17/18 140/83    Weight from Last 3 Encounters:   01/31/18 177 lb (80.3 kg)   01/30/18 170 lb (77.1 kg)   07/26/17 180 lb (81.6 kg)              Today, you had the following     No orders found for display         Today's Medication Changes          These changes are accurate as of 1/31/18  3:09 PM.  If you have any questions, ask your nurse or doctor.               Start taking these medicines.        Dose/Directions    amoxicillin-clavulanate 875-125 MG per tablet   Commonly known as:  AUGMENTIN   Used for:  Acute non-recurrent maxillary sinusitis   Started by:  Tiffanie Shannon PA-C        Dose:  1 tablet   Take 1 tablet by mouth 2 times daily For 10 days   Quantity:  20 tablet   Refills:  0         These medicines have changed or have updated prescriptions.        Dose/Directions    acetaminophen 500 MG tablet   Commonly known as:  TYLENOL   This may have changed:  when to take this   Used for:  Chest wall pain   Changed by:  Tiffanie Shannon PA-C        Dose:  1000 mg   Take 2 tablets (1,000 mg) by mouth every 8 hours as needed for mild pain   Quantity:  30 tablet   Refills:  0            Where to get your medicines      These medications were sent to [address] [address]     Phone:  [phone]     acetaminophen 500 MG tablet    amoxicillin-clavulanate 875-125 MG per tablet    cetirizine 5 MG tablet    EPINEPHrine 0.3 MG/0.3ML injection 2-pack                Primary Care Provider Office Phone # Fax #    Tiffanie Shannon PA-C [phone]       [address] [place] [address]" 0  [facility] [address]        Equal Access to Services     [facility] campusTAMRA : Hadii aad ku hadoraliajanae Meenakshi, waorvilleda luqadaha, qaybta kaalmakev vu. So [facility] [phone].    ATENCIÓN: Si habla español, tiene a novoa disposición servicios gratuitos de asistencia lingüística. [facility] [phone].    We comply with applicable federal civil rights laws and [place] laws. We do not discriminate on the basis of race, color, national origin, age, disability, sex, sexual orientation, or gender identity.            Thank you!     Thank you for choosing [facility]  for your care. Our goal is always to provide you with excellent care. Hearing back from our patients is one way we can continue to improve our services. Please take a few minutes to complete the written survey that you may receive in the mail after your visit with us. Thank you!             Your Updated Medication List - Protect others around you: Learn how to safely use, store and throw away your medicines at www.disposemymeds.org.          This list is accurate as of 1/31/18  3:09 PM.  Always use your most recent med list.                   Brand Name Dispense Instructions for use Diagnosis    acetaminophen 500 MG tablet    TYLENOL    30 tablet    Take 2 tablets (1,000 mg) by mouth every 8 hours as needed for mild pain    Chest wall pain       amoxicillin-clavulanate 875-125 MG per tablet    AUGMENTIN    20 tablet    Take 1 tablet by mouth 2 times daily For 10 days    Acute non-recurrent maxillary sinusitis       cetirizine 5 MG tablet    zyrTEC    30 tablet    Take 1 tablet (5 mg) by mouth daily    Seasonal allergic rhinitis, unspecified chronicity, unspecified trigger       cholecalciferol 1000 UNIT tablet    vitamin D3    100 tablet    Take 1 tablet (1,000 Units) by mouth daily    Bipolar affective disorder, remission status unspecified (H)       EPINEPHrine 0.3 MG/0.3ML injection 2-pack     EPIPEN/ADRENACLICK/or ANY BX GENERIC EQUIV    0.6 mL    Inject 0.3 mLs (0.3 mg) into the muscle as needed for anaphylaxis    Peanut allergy       lidocaine 5 % Patch    LIDODERM    10 patch    Place 1 patch onto the skin every 24 hours for 10 days        sodium chloride-sodium bicarb 2300-700 MG Kit    CLASSIC NETI POT SINUS WASH    1 kit    Spray 1 packet in nostril daily        UNKNOWN TO PATIENT      Antibiotic for bacteria vaginal infection

## 2018-01-31 NOTE — PATIENT INSTRUCTIONS
Sinusitis (Antibiotic Treatment)    The sinuses are air-filled spaces within the bones of the face. They connect to the inside of the nose. Sinusitis is an inflammation of the tissue lining the sinus cavity. Sinus inflammation can occur during a cold. It can also be due to allergies to pollens and other particles in the air. Sinusitis can cause symptoms of sinus congestion and fullness. A sinus infection causes fever, headache and facial pain. There is often green or yellow drainage from the nose or into the back of the throat (post-nasal drip). You have been given antibiotics to treat this condition.  Home care:    Take the full course of antibiotics as instructed. Do not stop taking them, even if you feel better.    Drink plenty of water, hot tea, and other liquids. This may help thin mucus. It also may promote sinus drainage.    Heat may help soothe painful areas of the face. Use a towel soaked in hot water. Or,  the shower and direct the hot spray onto your face. Using a vaporizer along with a menthol rub at night may also help.     An expectorant containing guaifenesin may help thin the mucus and promote drainage from the sinuses.    Over-the-counter decongestants may be used unless a similar medicine was prescribed. Nasal sprays work the fastest. Use one that contains phenylephrine or oxymetazoline. First blow the nose gently. Then use the spray. Do not use these medicines more often than directed on the label or symptoms may get worse. You may also use tablets containing pseudoephedrine. Avoid products that combine ingredients, because side effects may be increased. Read labels. You can also ask the pharmacist for help. (NOTE: Persons with high blood pressure should not use decongestants. They can raise blood pressure.)    Over-the-counter antihistamines may help if allergies contributed to your sinusitis.      Do not use nasal rinses or irrigation during an acute sinus infection, unless told to by  your health care provider. Rinsing may spread the infection to other sinuses.    Use acetaminophen or ibuprofen to control pain, unless another pain medicine was prescribed. (If you have chronic liver or kidney disease or ever had a stomach ulcer, talk with your doctor before using these medicines. Aspirin should never be used in anyone under 18 years of age who is ill with a fever. It may cause severe liver damage.)    Don't smoke. This can worsen symptoms.  Follow-up care  Follow up with your healthcare provider or our staff if you are not improving within the next week.  When to seek medical advice  Call your healthcare provider if any of these occur:    Facial pain or headache becoming more severe    Stiff neck    Unusual drowsiness or confusion    Swelling of the forehead or eyelids    Vision problems, including blurred or double vision    Fever of 100.4 F (38 C) or higher, or as directed by your healthcare provider    Seizure    Breathing problems    Symptoms not resolving within 10 days  Date Last Reviewed: 4/13/2015 2000-2017 [employer]. [address] [place] [address], [place] [id number]. All rights reserved. This information is not intended as a substitute for professional medical care. Always follow your healthcare professional's instructions.
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
## 2018-01-31 NOTE — DISCHARGE INSTRUCTIONS
Please return to the ED if you have active chest pain, shortness of breath, nausea, sweatiness, or other acute changes.  Please follow up with your PCP in the next 2-3 days.      Discharge Instructions  Sinus Infection    You have acute sinusitis, or an infection of the sinuses. The sinuses are the hollow areas within the facial bones that are connected to the nasal opening. The most common cause of acute sinusitis is a virus infection associated with the common cold. Bacterial sinusitis occurs much less commonly, usually as a complication of viral sinusitis. Experts say that most sinusitis is caused by a virus within the first 7-10 days of illness. Antibiotics do nothing to help with virus infections, so most people do not need antibiotics for acute sinusitis.     Generally, every Emergency Department visit should have a follow-up clinic visit with either a primary or a specialty clinic/provider. Please follow-up as instructed by your emergency provider today.    Return to the Emergency Department if:    Your vision changes.    You are confused or have difficulty thinking clearly.    You have swelling around your eye.    You develop a severe headache or neck stiffness.    Your symptoms get worse and you are unable to see your primary provider.      Treatment:    Pain relief -- Non-prescription pain medications, such as Tylenol  (acetaminophen) or Motrin  or Advil  (ibuprofen) are recommended for pain.  Do not use a medicine that you are allergic to, or if your provider has told you not to use it.       Nasal irrigation -- Flushing the nose and sinuses with a saline solution several times per day can help to decrease pain caused by congestion.    Nasal decongestants -- Nasal decongestant sprays, including Afrin  (oxymetazoline) and Clifford-Synephrine  (phenylephrine) can be used to temporarily treat congestion. However, these sprays should not be used for more than two to three days due to the risk of rebound congestion  (when the nose is congested constantly unless the medication is used repeatedly).    Nasal glucocorticoids -- These are prescription steroids delivered by a nasal spray that can help to reduce swelling inside the nose, usually within two to three days. These drugs have few side effects and dramatically relieve symptoms in most people.  If you use these in conjunction with Afrin  you will need to use at least 15 minutes prior to the nasal decongestant.      Antibiotic? -- Rarely antibiotics are used along with the above treatments.    If you were given a prescription for medicine here today, be sure to read all of the information (including the package insert) that comes with your prescription.  This will include important information about the medicine, its side effects, and any warnings that you need to know about.  The pharmacist who fills the prescription can provide more information and answer questions you may have about the medicine.  If you have questions or concerns that the pharmacist cannot address, please call or return to the Emergency Department.   Remember that you can always come back to the Emergency Department if you are not able to see your regular provider in the amount of time listed above, if you get any new symptoms, or if there is anything that worries you.    Discharge Instructions  Chest Pain    You have been seen today for chest pain or discomfort.  At this time, your provider has found no signs that your chest pain is due to a serious or life-threatening condition, (or you have declined more testing and/or admission to the hospital). However, sometimes there is a serious problem that does not show up right away. Your evaluation today may not be complete and you may need further testing and evaluation.     Generally, every Emergency Department visit should have a follow-up clinic visit with either a primary or a specialty clinic/provider. Please follow-up as instructed by your emergency  provider today.  Return to the Emergency Department if:    Your chest pain changes, gets worse, starts to happen more often, or comes with less activity.    You are newly short of breath.    You get very weak or tired.    You pass out or faint.    You have any new symptoms, like fever, cough, numb legs, or you cough up blood.    You have anything else that worries you.    Until you follow-up with your regular provider, please do the following:    Take one aspirin daily unless you have an allergy or are told not to by your provider.    If a stress test appointment has been made, go to the appointment.    If you have questions, contact your regular provider.    Follow-up with your regular provider/clinic as directed; this is very important.    If you were given a prescription for medicine here today, be sure to read all of the information (including the package insert) that comes with your prescription.  This will include important information about the medicine, its side effects, and any warnings that you need to know about.  The pharmacist who fills the prescription can provide more information and answer questions you may have about the medicine.  If you have questions or concerns that the pharmacist cannot address, please call or return to the Emergency Department.       Remember that you can always come back to the Emergency Department if you are not able to see your regular provider in the amount of time listed above, if you get any new symptoms, or if there is anything that worries you.

## 2018-02-09 ENCOUNTER — OFFICE VISIT (OUTPATIENT)
Dept: FAMILY MEDICINE | Facility: CLINIC | Age: 48
End: 2018-02-09
Payer: MEDICARE

## 2018-02-09 VITALS
SYSTOLIC BLOOD PRESSURE: 124 MMHG | HEIGHT: 65 IN | DIASTOLIC BLOOD PRESSURE: 88 MMHG | TEMPERATURE: 98.2 F | HEART RATE: 81 BPM | OXYGEN SATURATION: 100 % | BODY MASS INDEX: 29.66 KG/M2 | WEIGHT: 178 LBS

## 2018-02-09 DIAGNOSIS — F33.9 EPISODE OF RECURRENT MAJOR DEPRESSIVE DISORDER, UNSPECIFIED DEPRESSION EPISODE SEVERITY (H): ICD-10-CM

## 2018-02-09 DIAGNOSIS — M54.41 RIGHT-SIDED LOW BACK PAIN WITH RIGHT-SIDED SCIATICA, UNSPECIFIED CHRONICITY: ICD-10-CM

## 2018-02-09 DIAGNOSIS — E55.9 VITAMIN D DEFICIENCY: ICD-10-CM

## 2018-02-09 DIAGNOSIS — F41.9 ANXIETY: ICD-10-CM

## 2018-02-09 DIAGNOSIS — R53.83 FATIGUE, UNSPECIFIED TYPE: Primary | ICD-10-CM

## 2018-02-09 DIAGNOSIS — F31.9 BIPOLAR AFFECTIVE DISORDER, REMISSION STATUS UNSPECIFIED (H): ICD-10-CM

## 2018-02-09 PROCEDURE — 84443 ASSAY THYROID STIM HORMONE: CPT | Performed by: PHYSICIAN ASSISTANT

## 2018-02-09 PROCEDURE — 36415 COLL VENOUS BLD VENIPUNCTURE: CPT | Performed by: PHYSICIAN ASSISTANT

## 2018-02-09 PROCEDURE — 82306 VITAMIN D 25 HYDROXY: CPT | Performed by: PHYSICIAN ASSISTANT

## 2018-02-09 PROCEDURE — 99214 OFFICE O/P EST MOD 30 MIN: CPT | Performed by: PHYSICIAN ASSISTANT

## 2018-02-09 ASSESSMENT — PATIENT HEALTH QUESTIONNAIRE - PHQ9: 5. POOR APPETITE OR OVEREATING: NEARLY EVERY DAY

## 2018-02-09 ASSESSMENT — ANXIETY QUESTIONNAIRES
2. NOT BEING ABLE TO STOP OR CONTROL WORRYING: NEARLY EVERY DAY
5. BEING SO RESTLESS THAT IT IS HARD TO SIT STILL: MORE THAN HALF THE DAYS
IF YOU CHECKED OFF ANY PROBLEMS ON THIS QUESTIONNAIRE, HOW DIFFICULT HAVE THESE PROBLEMS MADE IT FOR YOU TO DO YOUR WORK, TAKE CARE OF THINGS AT HOME, OR GET ALONG WITH OTHER PEOPLE: VERY DIFFICULT
6. BECOMING EASILY ANNOYED OR IRRITABLE: NEARLY EVERY DAY
7. FEELING AFRAID AS IF SOMETHING AWFUL MIGHT HAPPEN: NEARLY EVERY DAY
GAD7 TOTAL SCORE: 20
1. FEELING NERVOUS, ANXIOUS, OR ON EDGE: NEARLY EVERY DAY
3. WORRYING TOO MUCH ABOUT DIFFERENT THINGS: NEARLY EVERY DAY

## 2018-02-09 NOTE — PATIENT INSTRUCTIONS
Please see see psychiatry as soon as you are able.     Please seek immediate medical attention if you have thoughts of wanting to hurt yourself or others.      [facility] Mobile Crisis Response Services  (contact the county where the person is physically located at the time of the crisis)  *[place] (Child and Adult):    [phone]  *[place] (Child and Adult):    [phone]  *[place] (Child and Adult):    [phone]  *[place] (Child):    [phone]    (Adult):    [phone]  *[place] (Child):    [phone]   (Adult):    [phone]  *[place] (Child and Adult):    [phone]  *[place] (Child and Adult):    [phone]  Other Crisis Resources (non-mobile)  *Acute Psychiatric Services (formerly Crisis Intervention Center):    [phone]    Walk-in and telephone crisis intervention services (focuses on >18 year-olds)    Located at [facility]      [address] [place] [address]  *Suicide Hotlines:    599.385.2574  or  5-333-UDYGEDH (469-5370)  or  7-073-819-TALK (5128)   Text Telephone:    6-842-461-3VXZ (7334)   LGBT Youth Suicide Hotline:    6-968-9-U-JANIA  *Women of [facility] ( women and children):    690.898.1527  or  [phone]  *[place] [facility] Crisis Line ( women and children):    157.234.4216       Short-term Residential Crisis Resources  *[facility] (ages 10-17):  [phone]     [address] [place] [address]   *[facility] Crisis [facility] (Birth - 6 years):    [phone]  *[facility] Crisis Nursery (Birth - 12 years):    [phone]       Inpatient Hospitalization and Residential Evaluation  *[facility] (Child and Adult)     [address] [place] [address]    Inpatient Intake [phone]    Behavioral Emergency Center:    [phone]    Day Treatment/Behavioral Intake:     [phone]  *[facility] Program (Adult):    [phone]

## 2018-02-09 NOTE — NURSING NOTE
"Chief Complaint   Patient presents with     Thyroid Problem       Initial /88 (BP Location: Left arm, Patient Position: Chair, Cuff Size: Adult Large)  Pulse 81  Temp 98.2  F (36.8  C) (Oral)  Ht 5' 5\" (1.651 m)  Wt 178 lb (80.7 kg)  LMP 01/10/2018  SpO2 100%  Breastfeeding? No  BMI 29.62 kg/m2 Estimated body mass index is 29.62 kg/(m^2) as calculated from the following:    Height as of this encounter: 5' 5\" (1.651 m).    Weight as of this encounter: 178 lb (80.7 kg).  Medication Reconciliation: complete   Csaba Mlnarik CMA    "

## 2018-02-09 NOTE — MR AVS SNAPSHOT
After Visit Summary   2/9/2018    Bernarda Garrett    MRN: [id number]           Patient Information     Date Of Birth          1970        Visit Information        Provider Department      2/9/2018 3:20 PM Tiffanie Shannon PA-C [facility] [place]        Today's Diagnoses     Right-sided low back pain with right-sided sciatica, unspecified chronicity    -  1    Bipolar affective disorder, remission status unspecified (H)        Fatigue, unspecified type        Vitamin D deficiency        Anxiety        Episode of recurrent major depressive disorder, unspecified depression episode severity (H)          Care Instructions    Please see see psychiatry as soon as you are able.     Please seek immediate medical attention if you have thoughts of wanting to hurt yourself or others.      [facility] Mobile Crisis Response Services  (contact the county where the person is physically located at the time of the crisis)  *[place] (Child and Adult):    [phone]  *[place] (Child and Adult):    [phone]  *[place] (Child and Adult):    [phone]  *[place] (Child):    [phone]    (Adult):    [phone]  *[place] (Child):    [phone]   (Adult):    [phone]  *[place] (Child and Adult):    [phone]  *[place] (Child and Adult):    [phone]  Other Crisis Resources (non-mobile)  *Acute Psychiatric Services (formerly Crisis Intervention Center):    [phone]    Walk-in and telephone crisis intervention services (focuses on >18 year-olds)    [place] [address]  *Suicide Hotlines:    627.541.5535  or  9-408-EOGEJGU (961-5328)  or  1-413-845-TALK (9301)   Text Telephone:    6-901-619-4TTY (2801)   LGBT Youth Suicide Hotline:    1-363-2-U-JANIA  *Women of [place] [facility] ( women and children):    852.468.7492  or  [phone]  *[place] [facility] Crisis Line ( women and  children):    [phone]       Short-term Residential Crisis Resources  *[facility] (ages 10-17):  [phone]     [address] [place] [address]   *[facility] Crisis Nursery (Birth - 6 years):    [phone]  *[facility] Crisis Nursery (Birth - 12 years):    [phone]       Inpatient Hospitalization and Residential Evaluation  *[facility] (Child and Adult)     [facility] [place] [address]    Inpatient Intake [phone]    Behavioral Emergency Center:    [phone]    Day Treatment/Behavioral Intake:    [phone]  *[facility] Program (Adult):    [phone]          Follow-ups after your visit        Additional Services     MENTAL HEALTH REFERRAL  - Adult; Psychiatry and Medication Management; Psychiatry; G: Collaborative Care Psychiatry Service   [place], [place] [phone]  Medication management & future refills will be returned to G PCP upon compl...       All scheduling is subject to the client's specific insurance plan & benefits, provider/location availability, and provider clinical specialities.  Please arrive 15 minutes early for your first appointment and bring your completed paperwork.    Please be aware that coverage of these services is subject to the terms and limitations of your health insurance plan.  Call member services at your health plan with any benefit or coverage questions.                      PHYSICAL THERAPY REFERRAL       *This therapy referral will be filtered to a centralized scheduling office at [facility] and the patient will receive a call to schedule an appointment at a [facility] location most convenient for them. *     [facility] provides Physical Therapy evaluation and treatment and many specialty services across the [facility] system.  If requesting a specialty program, please choose  "from the list below.    If you have not heard from the scheduling office within 2 business days, please call [phone] for all locations, with the exception of [place], please call [phone].  Treatment: Evaluation & Treatment  Special Instructions/Modalities: None  Special Programs: None    Please be aware that coverage of these services is subject to the terms and limitations of your health insurance plan.  Call member services at your health plan with any benefit or coverage questions.      **Note to Provider:  If you are referring outside of [place] [facility] for the therapy appointment, please list the name of the location in the \"special instructions\" above, print the referral and give to the patient to schedule the appointment.                  Who to contact     If you have questions or need follow up information about today's clinic visit or your schedule please contact [facility] [place] directly at [phone].  Normal or non-critical lab and imaging results will be communicated to you by MyChart, letter or phone within 4 business days after the clinic has received the results. If you do not hear from us within 7 days, please contact the clinic through MyChart or phone. If you have a critical or abnormal lab result, we will notify you by phone as soon as possible.  Submit refill requests through [employer] or call your pharmacy and they will forward the refill request to us. Please allow 3 business days for your refill to be completed.          Additional Information About Your Visit        [employer] Information     [employer] lets you send messages to your doctor, view your test results, renew your prescriptions, schedule appointments and more. To sign up, go to [email or web address] . Click on \"Log in\" on the left side of the screen, which will take you to the Welcome page. Then click on \"Sign up Now\" on the right side of the page.     You will be asked to enter the access code listed below, as well as " "some personal information. Please follow the directions to create your username and password.     Your access code is: [email or web address]  Expires: 2018  1:56 AM     Your access code will  in 90 days. If you need help or a new code, please call your [facility] clinic or [phone].        Care EveryWhere ID     This is your Care EveryWhere ID. This could be used by other organizations to access your [facility] medical records  [id number]        Your Vitals Were     Pulse Temperature Height Last Period Pulse Oximetry Breastfeeding?    81 98.2  F (36.8  C) (Oral) 5' 5\" (1.651 m) 01/10/2018 100% No    BMI (Body Mass Index)                   29.62 kg/m2            Blood Pressure from Last 3 Encounters:   18 124/88   18 138/72   18 138/86    Weight from Last 3 Encounters:   18 178 lb (80.7 kg)   18 177 lb (80.3 kg)   18 170 lb (77.1 kg)              We Performed the Following     MENTAL HEALTH REFERRAL  - Adult; Psychiatry and Medication Management; Psychiatry; [facility]: Collaborative Care Psychiatry Service   [place], [place] [phone]  Medication management & future refills will be returned to G PCP upon compl...     PHYSICAL THERAPY REFERRAL     TSH with free T4 reflex     Vitamin D Deficiency          Today's Medication Changes          These changes are accurate as of 18  5:08 PM.  If you have any questions, ask your nurse or doctor.               Stop taking these medicines if you haven't already. Please contact your care team if you have questions.     lidocaine 5 % Patch   Commonly known as:  LIDODERM   Stopped by:  Tiffanie Shannon PA-C                Where to get your medicines      These medications were sent to [facility] [place] - [address] [address]     Phone:  [phone]     cholecalciferol 1000 UNIT tablet                Primary Care Provider Office Phone # Fax #    " Tiffanie Shannon PA-C [phone]       [address] [address]        Equal Access to Services     CAROLA MALIK : Hadnneka graham pandey louis Sopeter, waorvilleda luqadaha, qaybta kaalmada zuleima, kev gonsalez miquellakshmi bhat laAlexandrocarolyn trevizo. So [facility] [phone].    ATENCIÓN: Si habla español, tiene a novoa disposición servicios gratuitos de asistencia lingüística. Llame al [phone].    We comply with applicable federal civil rights laws and [place] laws. We do not discriminate on the basis of race, color, national origin, age, disability, sex, sexual orientation, or gender identity.            Thank you!     Thank you for choosing [facility]  for your care. Our goal is always to provide you with excellent care. Hearing back from our patients is one way we can continue to improve our services. Please take a few minutes to complete the written survey that you may receive in the mail after your visit with us. Thank you!             Your Updated Medication List - Protect others around you: Learn how to safely use, store and throw away your medicines at www.disposemymeds.org.          This list is accurate as of 2/9/18  5:08 PM.  Always use your most recent med list.                   Brand Name Dispense Instructions for use Diagnosis    acetaminophen 500 MG tablet    TYLENOL    30 tablet    Take 2 tablets (1,000 mg) by mouth every 8 hours as needed for mild pain    Chest wall pain       amoxicillin-clavulanate 875-125 MG per tablet    AUGMENTIN    20 tablet    Take 1 tablet by mouth 2 times daily For 10 days    Acute non-recurrent maxillary sinusitis       cetirizine 5 MG tablet    zyrTEC    30 tablet    Take 1 tablet (5 mg) by mouth daily    Seasonal allergic rhinitis, unspecified chronicity, unspecified trigger       cholecalciferol 1000 UNIT tablet    vitamin D3    100 tablet    Take 1 tablet (1,000 Units) by mouth daily    Bipolar affective disorder, remission  status unspecified (H)       EPINEPHrine 0.3 MG/0.3ML injection 2-pack    EPIPEN/ADRENACLICK/or ANY BX GENERIC EQUIV    0.6 mL    Inject 0.3 mLs (0.3 mg) into the muscle as needed for anaphylaxis    Peanut allergy       sodium chloride-sodium bicarb 2300-700 MG Kit    CLASSIC NETI POT SINUS WASH    1 kit    Spray 1 packet in nostril daily

## 2018-02-09 NOTE — PROGRESS NOTES
"  SUBJECTIVE:                                                    Bernarda Garrett is a 48 year old female who presents to clinic today for the following health issues:      Thyroid check -- feeling fatigued, gaining and loosing weight.  Face - breaking out    Requesting rx for Ensure - does not have an appetite.     Wt Readings from Last 4 Encounters:   02/09/18 178 lb (80.7 kg)   01/31/18 177 lb (80.3 kg)   01/30/18 170 lb (77.1 kg)   07/26/17 180 lb (81.6 kg)     Patient reports that she does not need the Rx for incontinence supplies.  She says that she does wear pad occasionally for stress incontinence.  She did need an Rx for incontinence supplies for her youngest son, but this was done at a prior office visit.        Patient reports that she has had sciatic back pain since she being pregnant with her now 7 year old.  She has seen doctors for this in the past, but no one from the [facility] system.  She reports that her back pain symptoms has been stable over the past 7 years, but is still present.  She denies loss of bladder or bowel control and denies paraesthesias as well.            Patient also wants to discuss treatment for her \"stress.\"  She has a lot of stress being a single mom of several kids with her youngest having autism.  She has had symptoms of mental health issues since she was 16.  She has tried several medications, but is not sure which ones.  She does have a history of bipolar affective disorder.    Patient reports that she has had passive thoughts of what things would be like if she were not here, but she denies having any plan or intent of hurting herself or others.  She reports that her kids need her and she can raise them best.         The patient reports to making this appointment today to have her thyroid checked as she has had fluctuations in her weight over the last year and she is tired.       Problem list and histories reviewed & adjusted, as indicated.  Additional history: as " "documented      ROS:  Constitutional, HEENT, cardiovascular, pulmonary, GI, , musculoskeletal, neuro, skin, endocrine and psych systems are negative, except as otherwise noted.    OBJECTIVE:                                                    /88 (BP Location: Left arm, Patient Position: Chair, Cuff Size: Adult Large)  Pulse 81  Temp 98.2  F (36.8  C) (Oral)  Ht 5' 5\" (1.651 m)  Wt 178 lb (80.7 kg)  LMP 01/10/2018  SpO2 100%  Breastfeeding? No  BMI 29.62 kg/m2  Body mass index is 29.62 kg/(m^2).  GENERAL: healthy, alert and no distress  EYES: Eyes grossly normal to inspection, PERRL and conjunctivae and sclerae normal  RESP: lungs clear to auscultation - no rales, rhonchi or wheezes  CV: regular rate and rhythm, normal S1 S2, no S3 or S4, no murmur, click or rub, no peripheral edema and peripheral pulses strong  ABDOMEN: soft, nontender, no hepatosplenomegaly, no masses and bowel sounds normal  MS: no gross musculoskeletal defects noted, no edema  NEURO: Normal strength and tone, mentation intact and speech normal  BACK: no CVA tenderness, no paralumbar tenderness  PSYCH: mentation appears normal, affect normal/bright    Diagnostic Test Results:  Labs - pending     ASSESSMENT/PLAN:                                                      Bernarda was seen today for thyroid problem.    Diagnoses and all orders for this visit:    Fatigue, unspecified type  -     Vitamin D Deficiency  -     TSH with free T4 reflex    Bipolar affective disorder, remission status unspecified (H)  -     cholecalciferol (VITAMIN D3) 1000 UNIT tablet; Take 1 tablet (1,000 Units) by mouth daily  -     MENTAL HEALTH REFERRAL  - Adult; Psychiatry and Medication Management; [place] [facility] [place] [facility] [place], [place] [phone]  Medication management & future refills will be returned to [facility] [place] upon compl...    Episode of recurrent major depressive disorder, unspecified depression episode " severity (H)  -     MENTAL HEALTH REFERRAL  - Adult; Psychiatry and Medication Management; [place] [facility] [facility] [place], [place] [phone]  Medication management & future refills will be returned to [facility] [place] upon compl...    Anxiety  -     MENTAL HEALTH REFERRAL  - Adult; Psychiatry and Medication Management; Psychiatry; [facility] [facility] - [place], [place] [phone]  Medication management & future refills will be returned to [facility] [place] upon compl...    Right-sided low back pain with right-sided sciatica, unspecified chronicity  -     PHYSICAL THERAPY REFERRAL    Vitamin D deficiency  -     Vitamin D Deficiency  -     Vitamin D Deficiency; Future      - TSH and vitamin D labs ordered today for patient to have done before leaving clinic today.   - Discussed anxiety and depression at great length today.  Patient reports that her mental health has been stable since she was about 16, but she is still interested in having it evaluated.    - Due to her history of bipolar affective disorder, we discussed that it would be best to be evaluated by psychiatry for further evaluation and treatment considerations.  Patient understands and agrees with this plan today.  Patient advised to followup with psychiatry as soon as she is able.      - Patient contracts for safety today and denies having any thoughts or intent to hurt herself or others.  She reports that she would not do that because her kids need her.  She also agrees to seek immediate help should she develop thoughts of self harm or wanting to harm others.   - Patient advised to followup with physical therapy to address the back pain as it has been chronic in nature.  Should have past records sent here to review any imaging done.    - I have discussed the patient's diagnosis, and my plan of treatment with the patient and/or family. Patient is aware to followup if symptoms do not improve.   Patient has been advised to be seen sooner or seek more immediate care if symptoms change or worsen.  Patient agrees with and understands the plan today.    -- Over 35 minutes spent with patient today and over 50% of the time was sent on counseling and coordination of care.         See Patient Instructions        Tiffanie Shannon PA-C    [facility]

## 2018-02-10 LAB — TSH SERPL DL<=0.005 MIU/L-ACNC: 1.46 MU/L (ref 0.4–4)

## 2018-02-10 ASSESSMENT — ANXIETY QUESTIONNAIRES: GAD7 TOTAL SCORE: 20

## 2018-02-10 ASSESSMENT — PATIENT HEALTH QUESTIONNAIRE - PHQ9: SUM OF ALL RESPONSES TO PHQ QUESTIONS 1-9: 23

## 2018-02-12 LAB — DEPRECATED CALCIDIOL+CALCIFEROL SERPL-MC: 13 UG/L (ref 20–75)

## 2018-02-14 NOTE — PROGRESS NOTES
Note to staff: Please call the patient to explain results.    The results from your recent lab work are below.    -Vitamin D level is low and oral supplementation should be started.  ADVISE: starting over the counter Vitamin D3  2000 IU - 3 tabs (6000 IU) daily for 6 weeks and then 2000 IU daily to maintain levels.  In 2 months, please schedule a lab only appointment to recheck Vitamin D levels (Vit D deficiency, DX: Vitamin D Deficiency)    - The thyroid level is normal.      Thank you for choosing [facility] for your health care needs,      Tiffanie Shannon PA-C

## 2018-02-15 ENCOUNTER — TELEPHONE (OUTPATIENT)
Dept: FAMILY MEDICINE | Facility: CLINIC | Age: 48
End: 2018-02-15

## 2018-02-15 ENCOUNTER — TRANSFERRED RECORDS (OUTPATIENT)
Dept: HEALTH INFORMATION MANAGEMENT | Facility: CLINIC | Age: 48
End: 2018-02-15

## 2018-02-15 DIAGNOSIS — M94.0 COSTOCHONDRITIS: Primary | ICD-10-CM

## 2018-02-15 NOTE — TELEPHONE ENCOUNTER
Please call patient for followup.  She was advised last week in clinic to followup with psychiatry and I don not see that she has an appointment scheduled.  Please remind her to make this appointment as soon as she is able, and see if she needs any assistance with this.      Thank you,     Tiffanie

## 2018-02-16 ENCOUNTER — TRANSFERRED RECORDS (OUTPATIENT)
Dept: HEALTH INFORMATION MANAGEMENT | Facility: CLINIC | Age: 48
End: 2018-02-16

## 2018-02-16 NOTE — TELEPHONE ENCOUNTER
Pt reports the psychiatrist had called and the pt was busy at that time. Pt will call back.    Pt wants lidocaine patches for chest pain, needed a PA.  Pt uses [facility]. Pt reports they have costochondritis and have had this for over one year.  Pt had gone to [facility] for this previously and notes they had talked to [facility] about this.    Routing to PCP for further review/recommendations/orders.  Rody Calvert RN  [place] [facility]

## 2018-02-28 RX ORDER — LIDOCAINE 50 MG/G
PATCH TOPICAL
Qty: 15 PATCH | Refills: 1 | Status: SHIPPED | OUTPATIENT
Start: 2018-02-28 | End: 2019-02-18

## 2018-02-28 NOTE — TELEPHONE ENCOUNTER
Rx sent to pharmacy.     Please let patient know that often times this is not covered by insurance, but can be purchased over the counter:  Salonpas lidocaine patches.

## 2018-03-01 NOTE — TELEPHONE ENCOUNTER
Attempt # 1 to [phone].     Left non-detailed VM for patient to call back.    STARR Milton, RN, PHN  [facility]

## 2018-03-05 NOTE — TELEPHONE ENCOUNTER
Pt calling     Advised pt on the information below     Patient stated an understanding and agreed with plan.    Marcia Mcdonald RN, BSN  [facility]

## 2018-03-06 ENCOUNTER — TELEPHONE (OUTPATIENT)
Dept: FAMILY MEDICINE | Facility: CLINIC | Age: 48
End: 2018-03-06

## 2018-03-06 DIAGNOSIS — L70.0 ACNE VULGARIS: ICD-10-CM

## 2018-03-06 NOTE — TELEPHONE ENCOUNTER
Reason for call:  Patient reporting a symptom    Symptom or request: states her vaginitis came back after period -wants to know what to do -is this ok?    Duration (how long have symptoms been present): recent    Have you been treated for this before? Yes    Additional comments: none    Phone Number patient can be reached at:  Cell number on file:    Telephone Information:   Mobile [phone]       Best Time:  any    Can we leave a detailed message on this number:  YES    Call taken on 3/6/2018 at 8:50 AM by Bina Mills

## 2018-03-06 NOTE — TELEPHONE ENCOUNTER
Requested Prescriptions   Pending Prescriptions Disp Refills     tretinoin (RETIN-A) 0.025 % cream 45 g 11     Sig: Spread a pea size amount into affected area topically at bedtime.  Use sunscreen SPF>20.    There is no refill protocol information for this order    Last Written Prescription Date:  07/15/2106  Last Fill Quantity: 45g,  # refills: 11   Last office visit: 2/9/2018 with prescribing provider:     Future Office Visit:      Removed from list 07/26/2017 -- pt stated broke out whenever used it                Reason for Call:  Medication or medication refill:    Do you use a [facility]?  Name of the pharmacy and phone number for the current request:  [facility] in [place]    Name of the medication requested: tretinoin (RETIN-A) 0.025 % cream     Other request: wants another prescription for this cream    Can we leave a detailed message on this number? YES    Phone number patient can be reached at: Cell number on file:    Telephone Information:   Mobile [phone]       Best Time: any    Call taken on 3/6/2018 at 8:46 AM by Bina Mills

## 2018-03-08 RX ORDER — TRETINOIN 0.25 MG/G
CREAM TOPICAL
Qty: 45 G | Refills: 11 | Status: SHIPPED | OUTPATIENT
Start: 2018-03-08 | End: 2018-06-05

## 2018-03-08 NOTE — TELEPHONE ENCOUNTER
"Requested Prescriptions   Pending Prescriptions Disp Refills     tretinoin (RETIN-A) 0.025 % cream 45 g 11     Sig: Spread a pea size amount into affected area topically at bedtime.  Use sunscreen SPF>20.    Topical Acne Medications Protocol Passed    3/6/2018  4:22 PM       Passed - Patient is 12 years of age or older       Passed - Recent (12 mo) or future (30 days) visit within the authorizing provider's specialty    Patient had office visit in the last year or has a visit in the next 30 days with authorizing provider.  See \"Patient Info\" tab in inbasket, or \"Choose Columns\" in Meds & Orders section of the refill encounter.                 Routing refill request to provider for review/approval because:  Drug not active on patient's medication list      STARR Milton, RN, PHN  [facility] [phone]            "

## 2018-03-09 ENCOUNTER — TELEPHONE (OUTPATIENT)
Dept: FAMILY MEDICINE | Facility: CLINIC | Age: 48
End: 2018-03-09

## 2018-03-09 NOTE — TELEPHONE ENCOUNTER
Prior Authorization Retail Medication Request    Medication/Dose: Tretinoin Cream  ICD code (if different than what is on RX):  Previously Tried and Failed:  Rationale:    Insurance Name: Medicare  Insurance ID: ?  Key : [id number]    Pharmacy Information (if different than what is on RX)  Name:  Phone:

## 2018-03-15 NOTE — TELEPHONE ENCOUNTER
Central Prior Authorization Team   Phone: [phone]    PA Initiation    Medication: Tretinoin Cream  Insurance Company: [employer] Part D - Phone [phone] Fax [phone]  Pharmacy Filling the Rx: -[facility] [place] - [address]  Filling Pharmacy Phone: [phone]  Filling Pharmacy Fax: [phone]  Start Date: 3/15/2018

## 2018-03-15 NOTE — TELEPHONE ENCOUNTER
Prior Authorization Approval    Authorization Effective Date: 1/1/2018  Authorization Expiration Date: 3/15/2019  Medication: Tretinoin Cream-APPROVED  Approved Dose/Quantity:    Reference #:     Insurance Company: [employer] Part D - Phone [phone] Fax [phone]  Expected CoPay: $0.00     CoPay Card Available:      Foundation Assistance Needed:    Which Pharmacy is filling the prescription (Not needed for infusion/clinic administered): [facility] [address] [place] - [address] [facility]  Pharmacy Notified: Yes  Patient Notified: Yes

## 2018-03-19 ENCOUNTER — ALLIED HEALTH/NURSE VISIT (OUTPATIENT)
Dept: NURSING | Facility: CLINIC | Age: 48
End: 2018-03-19
Payer: MEDICARE

## 2018-03-19 VITALS
TEMPERATURE: 96.9 F | BODY MASS INDEX: 29.66 KG/M2 | SYSTOLIC BLOOD PRESSURE: 144 MMHG | WEIGHT: 178 LBS | HEART RATE: 79 BPM | RESPIRATION RATE: 12 BRPM | HEIGHT: 65 IN | OXYGEN SATURATION: 100 % | DIASTOLIC BLOOD PRESSURE: 84 MMHG

## 2018-03-19 DIAGNOSIS — N89.8 VAGINAL DISCHARGE: Primary | ICD-10-CM

## 2018-03-19 PROCEDURE — 99207 ZZC NO CHARGE NURSE ONLY: CPT

## 2018-03-19 NOTE — TELEPHONE ENCOUNTER
pharmacy calling ( [facility]) asking about status of PA on lidocaine Patches     Reviewed chart - there was no PA started on lidocaine patches     Please start PA     Thank you     Marcia Mcdonald RN, BSN  [place] [facility]

## 2018-03-19 NOTE — MR AVS SNAPSHOT
"              After Visit Summary   3/19/2018    Bernarda Garrett    MRN: [id number]           Patient Information     Date Of Birth          1970        Visit Information        Provider Department      3/19/2018 3:30 PM [facility]TICO[facility] CLINIC [facility]        Today's Diagnoses     Vaginal discharge    -  1       Follow-ups after your visit        Your next 10 appointments already scheduled     Mar 20, 2018  4:00 PM CDT   SHORT with Tiffanie Shannon PA-C   [facility] ([facility]    [address] [place] [address] [phone]              Who to contact     If you have questions or need follow up information about today's clinic visit or your schedule please contact [facility] directly at [phone].  Normal or non-critical lab and imaging results will be communicated to you by MyChart, letter or phone within 4 business days after the clinic has received the results. If you do not hear from us within 7 days, please contact the clinic through MyChart or phone. If you have a critical or abnormal lab result, we will notify you by phone as soon as possible.  Submit refill requests through [employer] or call your pharmacy and they will forward the refill request to us. Please allow 3 business days for your refill to be completed.          Additional Information About Your Visit        MyChart Information     [employer] lets you send messages to your doctor, view your test results, renew your prescriptions, schedule appointments and more. To sign up, go to [email or web address] . Click on \"Log in\" on the left side of the screen, which will take you to the Welcome page. Then click on \"Sign up Now\" on the right side of the page.     You will be asked to enter the access code listed below, as well as some personal information. Please follow the directions to create your username and password.     Your access code is: " "[email or web address]  Expires: 2018  2:56 AM     Your access code will  in 90 days. If you need help or a new code, please call your [facility] clinic or [phone].        Care EveryWhere ID     This is your Care EveryWhere ID. This could be used by other organizations to access your [facility] medical records  [id number]        Your Vitals Were     Pulse Temperature Respirations Height Pulse Oximetry BMI (Body Mass Index)    79 96.9  F (36.1  C) (Oral) 12 5' 5\" (1.651 m) 100% 29.62 kg/m2       Blood Pressure from Last 3 Encounters:   18 144/84   18 124/88   18 138/72    Weight from Last 3 Encounters:   18 178 lb (80.7 kg)   18 178 lb (80.7 kg)   18 177 lb (80.3 kg)              Today, you had the following     No orders found for display       Primary Care Provider Office Phone # Fax #    Tiffanie Shannon PA-C [phone]       [address] [place] [address]        Equal Access to Services     STEPHANIE MALIK AH: Hadii graham garveyo Soomaali, waaxda luqadaha, qaybta kaalmada adeegyada, kev trevizo. So [facility] [phone].    ATENCIÓN: Si habla español, tiene a novoa disposición servicios gratuitos de asistencia lingüística. Fahadame al [phone].    We comply with applicable federal civil rights laws and [place] laws. We do not discriminate on the basis of race, color, national origin, age, disability, sex, sexual orientation, or gender identity.            Thank you!     Thank you for choosing [facility]  for your care. Our goal is always to provide you with excellent care. Hearing back from our patients is one way we can continue to improve our services. Please take a few minutes to complete the written survey that you may receive in the mail after your visit with us. Thank you!             Your Updated Medication List - Protect others around you: Learn how to safely use, store and throw away your " medicines at www.disposemymeds.org.          This list is accurate as of 3/19/18  4:23 PM.  Always use your most recent med list.                   Brand Name Dispense Instructions for use Diagnosis    acetaminophen 500 MG tablet    TYLENOL    30 tablet    Take 2 tablets (1,000 mg) by mouth every 8 hours as needed for mild pain    Chest wall pain       amoxicillin-clavulanate 875-125 MG per tablet    AUGMENTIN    20 tablet    Take 1 tablet by mouth 2 times daily For 10 days    Acute non-recurrent maxillary sinusitis       cetirizine 5 MG tablet    zyrTEC    30 tablet    Take 1 tablet (5 mg) by mouth daily    Seasonal allergic rhinitis, unspecified chronicity, unspecified trigger       cholecalciferol 1000 UNIT tablet    vitamin D3    100 tablet    Take 1 tablet (1,000 Units) by mouth daily    Bipolar affective disorder, remission status unspecified (H)       EPINEPHrine 0.3 MG/0.3ML injection 2-pack    EPIPEN/ADRENACLICK/or ANY BX GENERIC EQUIV    0.6 mL    Inject 0.3 mLs (0.3 mg) into the muscle as needed for anaphylaxis    Peanut allergy       lidocaine 5 % Patch    LIDODERM    15 patch    Apply up to 1 patch to painful area for up to 12 h within a 24 h period.  Remove after 12 hours.    Costochondritis       sodium chloride-sodium bicarb 2300-700 MG Kit    CLASSIC NETI POT SINUS WASH    1 kit    Spray 1 packet in nostril daily        tretinoin 0.025 % cream    RETIN-A    45 g    Spread a pea size amount into affected area topically at bedtime.  Use sunscreen SPF>20.    Acne vulgaris

## 2018-03-19 NOTE — NURSING NOTE
"Chief Complaint   Patient presents with     Vaginal Problem     Odor     Sinus Problem     Pressure       Initial /84 (BP Location: Right arm, Patient Position: Sitting, Cuff Size: Adult Regular)  Pulse 79  Temp 96.9  F (36.1  C) (Oral)  Resp 12  Ht 5' 5\" (1.651 m)  Wt 178 lb (80.7 kg)  SpO2 100%  BMI 29.62 kg/m2 Estimated body mass index is 29.62 kg/(m^2) as calculated from the following:    Height as of this encounter: 5' 5\" (1.651 m).    Weight as of this encounter: 178 lb (80.7 kg).  Medication Reconciliation: complete       Jazz Brannon RN  [place] Triage      "

## 2018-03-19 NOTE — PROGRESS NOTES
URINARY TRACT SYMPTOMS  Onset: 1.5 Months  Description:   Painful urination (Dysuria): no   Blood in urine (Hematuria): no   Delay in urine (Hesitency): no     Intensity: 0/10    Progression of Symptoms:  same and constant    Accompanying Signs & Symptoms:  Fever/chills: no   Flank pain no   Nausea and vomiting: YES- Nausea Intermittently  Any vaginal symptoms: vaginal discharge (creamy/White), vaginal odor  Abdominal/Pelvic Pain: no     History:   History of frequent UTI's: no   History of kidney stones: no   Sexually Active: no   Possibility of pregnancy: No    Precipitating factors:   None    Therapies Tried and outcome: Pain Relievers (for chronic back pain)       Patient states she also thinks she has a sinus infection starting  Acute Illness   Acute illness concerns: Sinus Pressure  Onset: 2 Weeks    Fever: no    Chills/Sweats: YES - sweats    Headache (location?): YES- constant, nagging    Sinus Pressure:YES- tender, post-nasal drainage, facial pain and teeth pain    Conjunctivitis:  YES- watery    Ear Pain: YES: bilateral, ringing    Rhinorrhea: YES    Congestion: YES    Sore Throat: no     Cough: YES-non-productive    Wheeze: no    Decreased Appetite: YES    Nausea: YES    Vomiting: no    Diarrhea:  no    Dysuria/Freq.: YES- see above    Fatigue/Achiness: YES    Sick/Strep Exposure: no     Therapies Tried and outcome: None     Patient states she does have CP (states it has been for ~1 year, diagnosed with costochondritis)     DENIES: abnormal vaginal bleeding, vaginal itching and dyspareunia (pain in labia/pelvis with intercourse), SOB, Difficulty  Breathing, Dizziness/Lightheadednes    Message handled by Nurse Triage with [facility] - provider name: SAUMYA ALCANTAR - schedule patient for tomorrow, 03/20/2018.  Patient scheduled for 4pm tomorrow   Patient stated an understanding and agreed with plan.    Jazz Brannon RN  [facility]

## 2018-03-20 NOTE — TELEPHONE ENCOUNTER
Denied to do Diagnoses -- do you want to write an appeal?     Csaba Mlnarik CMA            PRIOR AUTHORIZATION DENIED    Medication: LIDODERM -DENIED    Denial Date: 3/20/2018    Denial Rational: CHONDROCOSTAL JUNCTION SYNDROME IS NOT AN FDA APPROVED DX FOR THIS MEDICATION.        Appeal Information: IF YOU WOULD LIKE TO APPEAL PLEASE SUPPLY LETTER OF MEDICAL NECESSITY WITH CLINICAL REASON.

## 2018-03-20 NOTE — TELEPHONE ENCOUNTER
Central Prior Authorization Team   Phone: [phone]    PA Initiation    Medication: LIDODERM   Insurance Company: [employer] Part D - Phone [phone] Fax [phone]  Pharmacy Filling the Rx: [facility] [place] - [address]  Filling Pharmacy Phone: [phone]  Filling Pharmacy Fax: [phone]  Start Date: 3/20/2018

## 2018-05-17 ENCOUNTER — TRANSFERRED RECORDS (OUTPATIENT)
Dept: HEALTH INFORMATION MANAGEMENT | Facility: CLINIC | Age: 48
End: 2018-05-17

## 2018-06-02 ENCOUNTER — APPOINTMENT (OUTPATIENT)
Dept: GENERAL RADIOLOGY | Facility: CLINIC | Age: 48
End: 2018-06-02
Attending: EMERGENCY MEDICINE
Payer: MEDICARE

## 2018-06-02 ENCOUNTER — HOSPITAL ENCOUNTER (EMERGENCY)
Facility: CLINIC | Age: 48
Discharge: HOME | End: 2018-06-03
Attending: EMERGENCY MEDICINE | Admitting: EMERGENCY MEDICINE
Payer: MEDICARE

## 2018-06-02 DIAGNOSIS — J30.2 SEASONAL ALLERGIC RHINITIS, UNSPECIFIED CHRONICITY, UNSPECIFIED TRIGGER: ICD-10-CM

## 2018-06-02 DIAGNOSIS — M94.0 COSTOCHONDRITIS: ICD-10-CM

## 2018-06-02 DIAGNOSIS — R07.89 ATYPICAL CHEST PAIN: ICD-10-CM

## 2018-06-02 LAB
ANION GAP SERPL CALCULATED.3IONS-SCNC: 8 MMOL/L (ref 3–14)
BASOPHILS # BLD AUTO: 0 10E9/L (ref 0–0.2)
BASOPHILS NFR BLD AUTO: 0.6 %
BUN SERPL-MCNC: 10 MG/DL (ref 7–30)
CALCIUM SERPL-MCNC: 8.5 MG/DL (ref 8.5–10.1)
CHLORIDE SERPL-SCNC: 106 MMOL/L (ref 94–109)
CO2 SERPL-SCNC: 27 MMOL/L (ref 20–32)
CREAT SERPL-MCNC: 0.99 MG/DL (ref 0.52–1.04)
DIFFERENTIAL METHOD BLD: ABNORMAL
EOSINOPHIL # BLD AUTO: 0.1 10E9/L (ref 0–0.7)
EOSINOPHIL NFR BLD AUTO: 1.6 %
ERYTHROCYTE [DISTWIDTH] IN BLOOD BY AUTOMATED COUNT: 14.7 % (ref 10–15)
GFR SERPL CREATININE-BSD FRML MDRD: 60 ML/MIN/1.7M2
GLUCOSE SERPL-MCNC: 93 MG/DL (ref 70–99)
HCT VFR BLD AUTO: 40.4 % (ref 35–47)
HGB BLD-MCNC: 13 G/DL (ref 11.7–15.7)
IMM GRANULOCYTES # BLD: 0 10E9/L (ref 0–0.4)
IMM GRANULOCYTES NFR BLD: 0.2 %
LYMPHOCYTES # BLD AUTO: 2.8 10E9/L (ref 0.8–5.3)
LYMPHOCYTES NFR BLD AUTO: 43.2 %
MCH RBC QN AUTO: 25.4 PG (ref 26.5–33)
MCHC RBC AUTO-ENTMCNC: 32.2 G/DL (ref 31.5–36.5)
MCV RBC AUTO: 79 FL (ref 78–100)
MONOCYTES # BLD AUTO: 0.7 10E9/L (ref 0–1.3)
MONOCYTES NFR BLD AUTO: 10.3 %
NEUTROPHILS # BLD AUTO: 2.8 10E9/L (ref 1.6–8.3)
NEUTROPHILS NFR BLD AUTO: 44.1 %
NRBC # BLD AUTO: 0 10*3/UL
NRBC BLD AUTO-RTO: 0 /100
PLATELET # BLD AUTO: 356 10E9/L (ref 150–450)
POTASSIUM SERPL-SCNC: 3.6 MMOL/L (ref 3.4–5.3)
RBC # BLD AUTO: 5.12 10E12/L (ref 3.8–5.2)
SODIUM SERPL-SCNC: 141 MMOL/L (ref 133–144)
TROPONIN I SERPL-MCNC: <0.015 UG/L (ref 0–0.04)
WBC # BLD AUTO: 6.4 10E9/L (ref 4–11)

## 2018-06-02 PROCEDURE — 80048 BASIC METABOLIC PNL TOTAL CA: CPT | Performed by: EMERGENCY MEDICINE

## 2018-06-02 PROCEDURE — 96374 THER/PROPH/DIAG INJ IV PUSH: CPT

## 2018-06-02 PROCEDURE — 71046 X-RAY EXAM CHEST 2 VIEWS: CPT

## 2018-06-02 PROCEDURE — A9270 NON-COVERED ITEM OR SERVICE: HCPCS | Mod: GY | Performed by: EMERGENCY MEDICINE

## 2018-06-02 PROCEDURE — 93005 ELECTROCARDIOGRAM TRACING: CPT

## 2018-06-02 PROCEDURE — 25000125 ZZHC RX 250: Performed by: EMERGENCY MEDICINE

## 2018-06-02 PROCEDURE — 25000132 ZZH RX MED GY IP 250 OP 250 PS 637: Mod: GY | Performed by: EMERGENCY MEDICINE

## 2018-06-02 PROCEDURE — 99285 EMERGENCY DEPT VISIT HI MDM: CPT | Mod: 25

## 2018-06-02 PROCEDURE — 84484 ASSAY OF TROPONIN QUANT: CPT | Performed by: EMERGENCY MEDICINE

## 2018-06-02 PROCEDURE — 85025 COMPLETE CBC W/AUTO DIFF WBC: CPT | Performed by: EMERGENCY MEDICINE

## 2018-06-02 PROCEDURE — 25000128 H RX IP 250 OP 636: Performed by: EMERGENCY MEDICINE

## 2018-06-02 RX ORDER — KETOROLAC TROMETHAMINE 15 MG/ML
15 INJECTION, SOLUTION INTRAMUSCULAR; INTRAVENOUS ONCE
Status: COMPLETED | OUTPATIENT
Start: 2018-06-02 | End: 2018-06-02

## 2018-06-02 RX ORDER — OXYMETAZOLINE HYDROCHLORIDE 0.05 G/100ML
2 SPRAY NASAL ONCE
Status: COMPLETED | OUTPATIENT
Start: 2018-06-02 | End: 2018-06-02

## 2018-06-02 RX ORDER — ACETAMINOPHEN 325 MG/1
650 TABLET ORAL ONCE
Status: COMPLETED | OUTPATIENT
Start: 2018-06-02 | End: 2018-06-02

## 2018-06-02 RX ADMIN — ACETAMINOPHEN 650 MG: 325 TABLET, FILM COATED ORAL at 23:13

## 2018-06-02 RX ADMIN — OXYMETAZOLINE HYDROCHLORIDE 2 SPRAY: 5 SPRAY NASAL at 23:13

## 2018-06-02 RX ADMIN — KETOROLAC TROMETHAMINE 15 MG: 15 INJECTION, SOLUTION INTRAMUSCULAR; INTRAVENOUS at 23:13

## 2018-06-02 RX ADMIN — LIDOCAINE HYDROCHLORIDE 30 ML: 20 SOLUTION ORAL; TOPICAL at 23:18

## 2018-06-02 ASSESSMENT — ENCOUNTER SYMPTOMS
DIARRHEA: 0
DYSURIA: 0
ABDOMINAL PAIN: 0
SHORTNESS OF BREATH: 1
RHINORRHEA: 1

## 2018-06-02 NOTE — ED AVS SNAPSHOT
[facility] Emergency Department    [address] [place] [address]    Phone:  [phone]    Fax:  [phone]                                       Bernarda Garrett   MRN: [id number]    Department:  [facility] Emergency Department   Date of Visit:  6/2/2018           Patient Information     Date Of Birth          1970        Your diagnoses for this visit were:     Atypical chest pain     Costochondritis     Seasonal allergic rhinitis, unspecified chronicity, unspecified trigger        You were seen by Tima Holman MD.        Discharge Instructions       Please make an appointment to follow up with your primary care provider in 3-5 days if not improving.        Costochondritis    Costochondritis is inflammation of a rib or the cartilage that connects a rib to your breastbone (sternum). It causes tenderness, and sometimes chest pain may be sharp or aching, or it may feel like pressure. Pain may get worse with deep breathing, movement, or exercise. In some cases, the pain is mistaken for a heart attack. Despite this, the condition is not serious. Read on to learn more about the condition and how it can be treated.  What causes costochondritis?  The cause of costochondritis is not completely clear, but it may happen after a chest injury, chest infection, or coughing episode. Some physical activities can sometimes lead to costochondritis. Large-breasted women may be more likely to have the condition. Often, the reason for the inflammation is unknown.  Diagnosing costochondritis  There is no test for costochondritis. The condition is diagnosed by the symptoms you have. Your healthcare provider will perform a physical exam. He or she will ask you about your symptoms and examine your chest for tenderness. In some cases, tests are done to rule out more serious problems. These tests may include imaging tests such as chest X-ray, CT scan, or an ECG.  Treating  costochondritis  If an underlying cause is found, treatment for that will likely relieve the problem. Costochondritis often goes away on its own. The course of the condition varies from person to person. It usually lasts from weeks to months. In some cases, mild symptoms continue for months to years. To ease symptoms:    Take medicine as directed. These relieve pain and swelling. Ibuprofen or other NSAIDs are often recommended. In some cases, you may be given prescription medicine, such as muscle relaxants.    Avoid activities that put stress on the chest or spine.    Apply a heating pad (set to warm, not too high, heat) to the breastbone several times a day.    Perform stretching exercises as directed.  Call the healthcare provider right away if you have any of the following:    Pain that is not relieved by medicine    Shortness of breath    Lightheadedness, dizziness, or fainting    Feeling of irregular heartbeat or fast pulse  Anyone with chest pain should see a healthcare provider, especially those who are older and may be at risk for heart disease.   Date Last Reviewed: 10/1/2016    8129-7674 [employer]. [address] [place] [address]. All rights reserved. This information is not intended as a substitute for professional medical care. Always follow your healthcare professional's instructions.          *CHEST PAIN, NONCARDIAC    Based on your visit today, the exact cause of your chest pain is not certain. Your condition does not seem serious and your pain does not appear to be coming from your heart. However, sometimes the signs of a serious problem take more time to appear. Therefore, please watch for the warning signs listed below.  HOME CARE:  1. Rest today and avoid strenuous activity.  2. Take any prescribed medicine as directed.  FOLLOW UP with your doctor in 1-3 days.   GET PROMPT MEDICAL ATTENTION if any of the following occur:    A change in the type of pain: if it feels  different, becomes more severe, lasts longer, or begins to spread into your shoulder, arm, neck, jaw or back    Shortness of breath or increased pain with breathing    Cough with blood or dark colored sputum (phlegm)    Weakness, dizziness, or fainting    Fever over 101  F (38.3  C)    Swelling, pain or redness in one leg    5876-4612 [employer]. [address] [place] [address]. All rights reserved. This information is not intended as a substitute for professional medical care. Always follow your healthcare professional's instructions.  This information has been modified by your health care provider with permission from the publisher.        24 Hour Appointment Hotline       To make an appointment at any [facility], call [phone] ([phone]). If you don't have a family doctor or clinic, we will help you find one. [facility] clinics are conveniently located to serve the needs of you and your family.             Review of your medicines      START taking        Dose / Directions Last dose taken    ranitidine 150 MG tablet   Commonly known as:  ZANTAC   Dose:  150 mg   Quantity:  60 tablet        Take 1 tablet (150 mg) by mouth 2 times daily   Refills:  1          Our records show that you are taking the medicines listed below. If these are incorrect, please call your family doctor or clinic.        Dose / Directions Last dose taken    acetaminophen 500 MG tablet   Commonly known as:  TYLENOL   Dose:  1000 mg   Quantity:  30 tablet        Take 2 tablets (1,000 mg) by mouth every 8 hours as needed for mild pain   Refills:  0        cetirizine 5 MG tablet   Commonly known as:  zyrTEC   Dose:  5 mg   Quantity:  30 tablet        Take 1 tablet (5 mg) by mouth daily   Refills:  1        cholecalciferol 1000 UNIT tablet   Commonly known as:  vitamin D3   Dose:  1000 Units   Quantity:  100 tablet        Take 1 tablet (1,000 Units) by mouth daily   Refills:  3        EPINEPHrine 0.3  MG/0.3ML injection 2-pack   Commonly known as:  EPIPEN/ADRENACLICK/or ANY BX GENERIC EQUIV   Dose:  0.3 mg   Quantity:  0.6 mL        Inject 0.3 mLs (0.3 mg) into the muscle as needed for anaphylaxis   Refills:  1        lidocaine 5 % Patch   Commonly known as:  LIDODERM   Quantity:  15 patch        Apply up to 1 patch to painful area for up to 12 h within a 24 h period.  Remove after 12 hours.   Refills:  1        sodium chloride-sodium bicarb 2300-700 MG Kit   Commonly known as:  CLASSIC NETI POT SINUS WASH   Dose:  1 packet   Quantity:  1 kit        Spray 1 packet in nostril daily   Refills:  0        tretinoin 0.025 % cream   Commonly known as:  RETIN-A   Quantity:  45 g        Spread a pea size amount into affected area topically at bedtime.  Use sunscreen SPF>20.   Refills:  11          STOP taking        Dose Reason for stopping Comments    amoxicillin-clavulanate 875-125 MG per tablet   Commonly known as:  AUGMENTIN                      Prescriptions were sent or printed at these locations (2 Prescriptions)                   Other Prescriptions                Printed at Department/Unit printer (2 of 2)         cetirizine (ZYRTEC) 5 MG tablet               ranitidine (ZANTAC) 150 MG tablet                Procedures and tests performed during your visit     Basic metabolic panel    CBC with platelets differential    EKG 12 lead    Troponin I (now)    XR Chest 2 Views      Orders Needing Specimen Collection     None      Pending Results     Date and Time Order Name Status Description    6/2/2018 2254 XR Chest 2 Views Preliminary     6/2/2018 2246 EKG 12 lead Preliminary             Pending Culture Results     No orders found for last 3 day(s).            Pending Results Instructions     If you had any lab results that were not finalized at the time of your Discharge, you can call the ED Lab Result RN at [phone]. You will be contacted by this team for any positive Lab results or changes in treatment. The  nurses are available 7 days a week from 10A to 6:30P.  You can leave a message 24 hours per day and they will return your call.        Test Results From Your Hospital Stay        6/2/2018 11:20 PM      Component Results     Component Value Ref Range & Units Status    WBC 6.4 4.0 - 11.0 10e9/L Final    RBC Count 5.12 3.8 - 5.2 10e12/L Final    Hemoglobin 13.0 11.7 - 15.7 g/dL Final    Hematocrit 40.4 35.0 - 47.0 % Final    MCV 79 78 - 100 fl Final    MCH 25.4 (L) 26.5 - 33.0 pg Final    MCHC 32.2 31.5 - 36.5 g/dL Final    RDW 14.7 10.0 - 15.0 % Final    Platelet Count 356 150 - 450 10e9/L Final    Diff Method Automated Method  Final    % Neutrophils 44.1 % Final    % Lymphocytes 43.2 % Final    % Monocytes 10.3 % Final    % Eosinophils 1.6 % Final    % Basophils 0.6 % Final    % Immature Granulocytes 0.2 % Final    Nucleated RBCs 0 0 /100 Final    Absolute Neutrophil 2.8 1.6 - 8.3 10e9/L Final    Absolute Lymphocytes 2.8 0.8 - 5.3 10e9/L Final    Absolute Monocytes 0.7 0.0 - 1.3 10e9/L Final    Absolute Eosinophils 0.1 0.0 - 0.7 10e9/L Final    Absolute Basophils 0.0 0.0 - 0.2 10e9/L Final    Abs Immature Granulocytes 0.0 0 - 0.4 10e9/L Final    Absolute Nucleated RBC 0.0  Final         6/2/2018 11:42 PM      Component Results     Component Value Ref Range & Units Status    Sodium 141 133 - 144 mmol/L Final    Potassium 3.6 3.4 - 5.3 mmol/L Final    Chloride 106 94 - 109 mmol/L Final    Carbon Dioxide 27 20 - 32 mmol/L Final    Anion Gap 8 3 - 14 mmol/L Final    Glucose 93 70 - 99 mg/dL Final    Urea Nitrogen 10 7 - 30 mg/dL Final    Creatinine 0.99 0.52 - 1.04 mg/dL Final    GFR Estimate 60 (L) >60 mL/min/1.7m2 Final    Non  GFR Calc    GFR Estimate If Black 72 >60 mL/min/1.7m2 Final    African American GFR Calc    Calcium 8.5 8.5 - 10.1 mg/dL Final         6/2/2018 11:48 PM      Narrative     XR CHEST 2 VIEWS   6/2/2018 11:35 PM     HISTORY: Chest pain.     COMPARISON: 1/30/2018.    FINDINGS: The  heart size is normal. The lungs are clear. No  pneumothorax or pleural effusion.        Impression     IMPRESSION: No acute abnormality.         6/2/2018 11:42 PM      Component Results     Component Value Ref Range & Units Status    Troponin I ES <0.015 0.000 - 0.045 ug/L Final    The 99th percentile for upper reference range is 0.045 ug/L.  Troponin values   in the range of 0.045 - 0.120 ug/L may be associated with risks of adverse   clinical events.                  Clinical Quality Measure: Blood Pressure Screening     Your blood pressure was checked while you were in the emergency department today. The last reading we obtained was  BP: (!) 155/92 . Please read the guidelines below about what these numbers mean and what you should do about them.  If your systolic blood pressure (the top number) is less than 120 and your diastolic blood pressure (the bottom number) is less than 80, then your blood pressure is normal. There is nothing more that you need to do about it.  If your systolic blood pressure (the top number) is 120-139 or your diastolic blood pressure (the bottom number) is 80-89, your blood pressure may be higher than it should be. You should have your blood pressure rechecked within a year by a primary care provider.  If your systolic blood pressure (the top number) is 140 or greater or your diastolic blood pressure (the bottom number) is 90 or greater, you may have high blood pressure. High blood pressure is treatable, but if left untreated over time it can put you at risk for heart attack, stroke, or kidney failure. You should have your blood pressure rechecked by a primary care provider within the next 4 weeks.  If your provider in the emergency department today gave you specific instructions to follow-up with your doctor or provider even sooner than that, you should follow that instruction and not wait for up to 4 weeks for your follow-up visit.        Thank you for choosing [facility]       Thank  "you for choosing [facility] for your care. Our goal is always to provide you with excellent care. Hearing back from our patients is one way we can continue to improve our services. Please take a few minutes to complete the written survey that you may receive in the mail after you visit with us. Thank you!        [employer] Information     [employer] lets you send messages to your doctor, view your test results, renew your prescriptions, schedule appointments and more. To sign up, go to [email or web address] . Click on \"Log in\" on the left side of the screen, which will take you to the Welcome page. Then click on \"Sign up Now\" on the right side of the page.     You will be asked to enter the access code listed below, as well as some personal information. Please follow the directions to create your username and password.     Your access code is: [email or web address]  Expires: 2018 12:32 AM     Your access code will  in 90 days. If you need help or a new code, please call your [facility] clinic or [phone].        Care EveryWhere ID     This is your Care EveryWhere ID. This could be used by other organizations to access your [facility] medical records  [id number]        Equal Access to Services     CAROLA MALIK : Kyle Arrieta, wamodesto treviño, qaybta kaalmada zuleima, kev trevizo. So [facility] [phone].    ATENCIÓN: Si habla español, tiene a novoa disposición servicios gratuitos de asistencia lingüística. Llame al [phone].    We comply with applicable federal civil rights laws and [place] laws. We do not discriminate on the basis of race, color, national origin, age, disability, sex, sexual orientation, or gender identity.            After Visit Summary       This is your record. Keep this with you and show to your community pharmacist(s) and doctor(s) at your next visit.                  "

## 2018-06-02 NOTE — ED AVS SNAPSHOT
[facility] Emergency Department    [address]    [facility] [address]    Phone:  [phone]    Fax:  [phone]                                       Bernarda Garrett   MRN: [id number]    Department:  [facility] Emergency Department   Date of Visit:  6/2/2018           After Visit Summary Signature Page     I have received my discharge instructions, and my questions have been answered. I have discussed any challenges I see with this plan with the nurse or doctor.    ..........................................................................................................................................  Patient/Patient Representative Signature      ..........................................................................................................................................  Patient Representative Print Name and Relationship to Patient    ..................................................               ................................................  Date                                            Time    ..........................................................................................................................................  Reviewed by Signature/Title    ...................................................              ..............................................  Date                                                            Time

## 2018-06-03 VITALS
HEART RATE: 81 BPM | SYSTOLIC BLOOD PRESSURE: 113 MMHG | OXYGEN SATURATION: 100 % | TEMPERATURE: 98.2 F | RESPIRATION RATE: 18 BRPM | DIASTOLIC BLOOD PRESSURE: 98 MMHG

## 2018-06-03 RX ORDER — IBUPROFEN 800 MG/1
800 TABLET, FILM COATED ORAL EVERY 8 HOURS PRN
Qty: 60 TABLET | Refills: 0 | Status: SHIPPED | OUTPATIENT
Start: 2018-06-03 | End: 2019-05-14

## 2018-06-03 RX ORDER — CETIRIZINE HYDROCHLORIDE 5 MG/1
5 TABLET ORAL DAILY
Qty: 30 TABLET | Refills: 1 | Status: SHIPPED | OUTPATIENT
Start: 2018-06-03 | End: 2018-06-05

## 2018-06-03 NOTE — DISCHARGE INSTRUCTIONS
Please make an appointment to follow up with your primary care provider in 3-5 days if not improving.        Costochondritis    Costochondritis is inflammation of a rib or the cartilage that connects a rib to your breastbone (sternum). It causes tenderness, and sometimes chest pain may be sharp or aching, or it may feel like pressure. Pain may get worse with deep breathing, movement, or exercise. In some cases, the pain is mistaken for a heart attack. Despite this, the condition is not serious. Read on to learn more about the condition and how it can be treated.  What causes costochondritis?  The cause of costochondritis is not completely clear, but it may happen after a chest injury, chest infection, or coughing episode. Some physical activities can sometimes lead to costochondritis. Large-breasted women may be more likely to have the condition. Often, the reason for the inflammation is unknown.  Diagnosing costochondritis  There is no test for costochondritis. The condition is diagnosed by the symptoms you have. Your healthcare provider will perform a physical exam. He or she will ask you about your symptoms and examine your chest for tenderness. In some cases, tests are done to rule out more serious problems. These tests may include imaging tests such as chest X-ray, CT scan, or an ECG.  Treating costochondritis  If an underlying cause is found, treatment for that will likely relieve the problem. Costochondritis often goes away on its own. The course of the condition varies from person to person. It usually lasts from weeks to months. In some cases, mild symptoms continue for months to years. To ease symptoms:    Take medicine as directed. These relieve pain and swelling. Ibuprofen or other NSAIDs are often recommended. In some cases, you may be given prescription medicine, such as muscle relaxants.    Avoid activities that put stress on the chest or spine.    Apply a heating pad (set to warm, not too high, heat)  to the breastbone several times a day.    Perform stretching exercises as directed.  Call the healthcare provider right away if you have any of the following:    Pain that is not relieved by medicine    Shortness of breath    Lightheadedness, dizziness, or fainting    Feeling of irregular heartbeat or fast pulse  Anyone with chest pain should see a healthcare provider, especially those who are older and may be at risk for heart disease.   Date Last Reviewed: 10/1/2016    3010-2268 [employer]. [address] [place] [address]. All rights reserved. This information is not intended as a substitute for professional medical care. Always follow your healthcare professional's instructions.          *CHEST PAIN, NONCARDIAC    Based on your visit today, the exact cause of your chest pain is not certain. Your condition does not seem serious and your pain does not appear to be coming from your heart. However, sometimes the signs of a serious problem take more time to appear. Therefore, please watch for the warning signs listed below.  HOME CARE:  1. Rest today and avoid strenuous activity.  2. Take any prescribed medicine as directed.  FOLLOW UP with your doctor in 1-3 days.   GET PROMPT MEDICAL ATTENTION if any of the following occur:    A change in the type of pain: if it feels different, becomes more severe, lasts longer, or begins to spread into your shoulder, arm, neck, jaw or back    Shortness of breath or increased pain with breathing    Cough with blood or dark colored sputum (phlegm)    Weakness, dizziness, or fainting    Fever over 101  F (38.3  C)    Swelling, pain or redness in one leg    4393-9605 [employer]. [address] [place] [address]. All rights reserved. This information is not intended as a substitute for professional medical care. Always follow your healthcare professional's instructions.  This information has been modified by your health care provider  with permission from the publisher.

## 2018-06-03 NOTE — ED PROVIDER NOTES
History     Chief Complaint:  Chest pain    HPI   Bernarda Garrett is a 48 year old female who presents with chest pain. Starting around 2 hours ago, the patient was lying down when she starting experiencing a pressure in her chest, prompting her to come to the ED. Upon arrival, she reports that although the pain does not radiate, she has also been experiencing shortness of breath and rhinorrhea. She denies any abdominal pain, diarrhea, or dysuria at this time.     Cardiac/PE/DVT Risk Factors:  History of hypertension - Yes  History of hyperlipidemia - No  History of diabetes - No  History of smoking - No  Personal history of PE/DVT - No  Family history of PE/DVT - No  Family history of heart complications - No  Recent travel - No  Recent surgery - No  Other immobilizations - No  Cancer - No    Allergies:  Dilaudid   Minocycline     Medications:    Augmentin  Zyrtec  Epipen  Lidoderm     Past Medical History:    Hypertension    Past Surgical History:    The patient does not have any pertinent past surgical history.    Family History:    No past pertinent family history.    Social History:  The patient was accompanied to the ED by her son.  Smoking Status: No  Smokeless Tobacco: No  Alcohol Use: No  Marital Status:       Review of Systems   HENT: Positive for rhinorrhea.    Respiratory: Positive for shortness of breath.    Cardiovascular: Positive for chest pain.   Gastrointestinal: Negative for abdominal pain and diarrhea.   Genitourinary: Negative for dysuria.   All other systems reviewed and are negative.    Physical Exam   Vitals:  Patient Vitals for the past 24 hrs:   BP Temp Temp src Pulse Heart Rate Resp   06/02/18 2242 (!) 155/92 98.2  F (36.8  C) Oral 81 81 18     Physical Exam   HENT:   Right Ear: External ear normal.   Left Ear: External ear normal.   Nose: Nose normal.   Mouth/Throat: No oropharyngeal exudate.   Right TM partially obscured by cerumen left TM is normal   Eyes: Conjunctivae and lids are  normal.   Neck: Neck supple. No tracheal deviation present.   Cardiovascular: Regular rhythm and intact distal pulses.    Pulmonary/Chest: Breath sounds normal. No respiratory distress. She exhibits tenderness (Reproducible tenderness palpation over the costosternal joints worse with resisted pectoralis contraction and stretching of the chest wall).   Abdominal: Soft. There is no tenderness. There is no rebound and no guarding.   Musculoskeletal:   No peripheral edema   Lymphadenopathy:     She has no cervical adenopathy.   Neurological: She is alert.   MAEE, no gross focal motor or sensory deficit   Skin: Skin is warm and dry. She is not diaphoretic.   Psychiatric: She has a normal mood and affect.   Nursing note and vitals reviewed.    Emergency Department Course     ECG:  ECG taken at 2250, ECG read at 2253  Normal sinus rhythm  Nonspecific T wave abnormality   Prolonged QT  Abnormal ECG  Rate 86 bpm. MS interval 130. QRS duration 84. QT/QTc 402/481. P-R-T axes 51 -14 26.    Imaging:  Radiology findings were communicated with the patient who voiced understanding of the findings.  XR chest 2 views:  No acute abnormality  Per Radiologist.     Laboratory:  Laboratory findings were communicated with the patient who voiced understanding of the findings.  Troponin (Collected 2305): <0.015  BMP: GFR: 60 (L), o/w WNL (Creatinine 0.99)  CBC: AWNL. (WBC 6.4, HGB 13.0, )     Interventions:  2313 Toradol, 15 mg IV  2313 Tylenol, 650 mg PO  2313 Afrin, 0.5% spay 2 spays nasal  2318 GI cocktail, 30 mLs PO     Emergency Department Course:  Nursing notes and vitals reviewed.  2245 I had my initial encounter with the patient.  I performed an exam of the patient as documented above.   EKG obtained in the ED, see results above.   IV was inserted and blood was drawn for laboratory testing, results above.  The patient was sent for a X ray while in the emergency department, results above.   0016 I rechecked the patient's  condition    0019 I discussed the treatment plan with the patient. They expressed understanding of this plan and consented to discharge. They will be discharged home with instructions for care and follow up. In addition, the patient will return to the emergency department with any new or worsening symptoms.  All questions were answered.  Impression & Plan      Medical Decision Makin-year-old female here with acute onset of retrosternal chest discomfort 2 hours prior to arrival not associated with exertion not positional somewhat reproducible on examination here likely this is not a significant cardiopulmonary etiology such as ACS AMI PE dissection pneumothorax etc.  Also low suspicion for emergent intra-abdominal process being referred to the chest.  Likely this is secured esophageal spasm muscular skeletal chest pain she is also endorsing some respiratory tract infectious symptoms albeit mild.  Will do EKG basic blood tests troponin chest x-ray as well as medications for symptom relief.  If workup is unremarkable I think she can be safely discharged home.  She is PERC criteria negative.     Improved, see above interventions here in the emergency room. Her EKG and workup here was unremarkable including a troponin which was undetectable. Chest X ray unremarkable. Strong suspicion that this is not cardiac in origin. She can be safely discharged home treated for Costochondritis.     Diagnosis:    ICD-10-CM    1. Atypical chest pain R07.89    2. Costochondritis M94.0    3. Seasonal allergic rhinitis, unspecified chronicity, unspecified trigger J30.2 cetirizine (ZYRTEC) 5 MG tablet     Disposition:   Discharge     Discharge Medications:  New Prescriptions    IBUPROFEN (ADVIL/MOTRIN) 800 MG TABLET    Take 1 tablet (800 mg) by mouth every 8 hours as needed for moderate pain    RANITIDINE (ZANTAC) 150 MG TABLET    Take 1 tablet (150 mg) by mouth 2 times daily     Scribe Disclosure:  Brent THOMSON, am serving as a  scribe at 10:52 PM on 6/2/2018 to document services personally performed by Tima Holman, *, based on my observations and the provider's statements to me.  6/2/2018   [facility] [place] [facility] EMERGENCY DEPARTMENT       Tima Holman MD  06/03/18 0359

## 2018-06-03 NOTE — ED TRIAGE NOTES
Pt had a sudden onset of midsternum chest pain after eating chinese food.  Also felt lightheaded, tingling in hands and toes.

## 2018-06-04 ENCOUNTER — TELEPHONE (OUTPATIENT)
Dept: FAMILY MEDICINE | Facility: CLINIC | Age: 48
End: 2018-06-04

## 2018-06-04 LAB — INTERPRETATION ECG - MUSE: NORMAL

## 2018-06-04 NOTE — TELEPHONE ENCOUNTER
Vaginal Symptoms      Duration: 1 week    Description  vaginal discharge - white/yellowish and fishy odor    Intensity:  mild    Accompanying signs and symptoms (fever/dysuria/abdominal or back pain): None    History  Sexually active: not at present  Possibility of pregnancy: No  Recent antibiotic use: no     Precipitating or alleviating factors: None    Therapies tried and outcome: none           Patient was seen in ER on 6/2/2018 and was evaluated for atypical chest pain.  Work up was unremarkable and she was treated for costochondritis.  She said the Ibuprofen is not working and would like to get something else.  She also is complain of vaginal discharge as noted above.    RN advised office visit.  Appointment scheduled with ORTIZ tomorrow morning.    Patient verbalized understanding and agreed with plan.    STARR Milton, RN, N  [facility]) [phone]

## 2018-06-05 ENCOUNTER — OFFICE VISIT (OUTPATIENT)
Dept: FAMILY MEDICINE | Facility: CLINIC | Age: 48
End: 2018-06-05
Payer: MEDICARE

## 2018-06-05 VITALS
HEIGHT: 65 IN | WEIGHT: 180 LBS | OXYGEN SATURATION: 100 % | TEMPERATURE: 98.8 F | DIASTOLIC BLOOD PRESSURE: 64 MMHG | HEART RATE: 65 BPM | BODY MASS INDEX: 29.99 KG/M2 | SYSTOLIC BLOOD PRESSURE: 118 MMHG

## 2018-06-05 DIAGNOSIS — L70.0 ACNE VULGARIS: ICD-10-CM

## 2018-06-05 DIAGNOSIS — N76.0 BACTERIAL VAGINOSIS: ICD-10-CM

## 2018-06-05 DIAGNOSIS — M94.0 COSTOCHONDRITIS: ICD-10-CM

## 2018-06-05 DIAGNOSIS — N89.8 VAGINAL DISCHARGE: Primary | ICD-10-CM

## 2018-06-05 DIAGNOSIS — B96.89 BACTERIAL VAGINOSIS: ICD-10-CM

## 2018-06-05 DIAGNOSIS — J30.2 SEASONAL ALLERGIC RHINITIS, UNSPECIFIED CHRONICITY, UNSPECIFIED TRIGGER: ICD-10-CM

## 2018-06-05 DIAGNOSIS — R07.89 CHEST WALL PAIN: ICD-10-CM

## 2018-06-05 LAB
SPECIMEN SOURCE: ABNORMAL
WET PREP SPEC: ABNORMAL

## 2018-06-05 PROCEDURE — 99214 OFFICE O/P EST MOD 30 MIN: CPT | Performed by: PHYSICIAN ASSISTANT

## 2018-06-05 PROCEDURE — 87210 SMEAR WET MOUNT SALINE/INK: CPT | Performed by: PHYSICIAN ASSISTANT

## 2018-06-05 RX ORDER — TRETINOIN 0.25 MG/G
CREAM TOPICAL
Qty: 45 G | Refills: 11 | Status: SHIPPED | OUTPATIENT
Start: 2018-06-05 | End: 2019-05-14

## 2018-06-05 RX ORDER — ACETAMINOPHEN 500 MG
1000 TABLET ORAL EVERY 8 HOURS PRN
Qty: 30 TABLET | Refills: 0 | Status: SHIPPED | OUTPATIENT
Start: 2018-06-05 | End: 2018-09-20

## 2018-06-05 RX ORDER — METRONIDAZOLE 7.5 MG/G
1 GEL VAGINAL AT BEDTIME
Qty: 70 G | Refills: 0 | Status: SHIPPED | OUTPATIENT
Start: 2018-06-05 | End: 2019-02-18

## 2018-06-05 RX ORDER — LORATADINE 10 MG/1
10 TABLET ORAL DAILY
Qty: 30 TABLET | Refills: 1 | Status: SHIPPED | OUTPATIENT
Start: 2018-06-05 | End: 2018-09-20

## 2018-06-05 NOTE — PROGRESS NOTES
"  SUBJECTIVE:                                                    Bernarda Garrett is a 48 year old female who presents to clinic today for the following health issues:      ED/UC Followup:    Facility:  [facility]  Date of visit: 06/02/2018  Reason for visit: Chest Pain  Current Status: Still in pain - given Motrin 800mg - no relief - asking for muscle relaxer's or something to get some relief     Patient reports that she was in the ED this past Sat for chest wall pain.  She has had this before.  The work up in the ED shows costochondritis.  The labs and EKG were within normal limits.  She was given ibuprofen 800 mg to take two times daily and she says that it is not touching the pain.  She is also taking the tylenol 1,000 mg two times daily as well.       Vaginal Symptoms  Onset: x1 week    Description:  Vaginal Discharge: light yellow   Itching (Pruritis): no   Burning sensation:  no   Odor: YES- fishy    Accompanying Signs & Symptoms:  Pain with Urination: no   Abdominal Pain: no   Fever: no     History:   Sexually active: no   New Partner: no   Possibility of Pregnancy:  No    Precipitating factors:   Recent Antibiotic Use: no     Alleviating factors:  nothing    Therapies Tried and outcome: nothing    She has noticed the vaginal discharge for the past week.  She says that there is an odor to it.  She has been treated for BV in the past and this feels similar.       Problem list and histories reviewed & adjusted, as indicated.  Additional history: as documented      ROS:  Constitutional, HEENT, cardiovascular, pulmonary, GI, , musculoskeletal, neuro, skin, endocrine and psych systems are negative, except as otherwise noted.    OBJECTIVE:                                                    /64 (BP Location: Left arm, Patient Position: Chair, Cuff Size: Adult Large)  Pulse 65  Temp 98.8  F (37.1  C) (Oral)  Ht 5' 5\" (1.651 m)  Wt 180 lb (81.6 kg)  LMP 05/10/2018  SpO2 100%  Breastfeeding? No  BMI 29.95 " kg/m2  Body mass index is 29.95 kg/(m^2).  GENERAL: healthy, alert and no distress  EYES: Eyes grossly normal to inspection, PERRL and conjunctivae and sclerae normal  RESP: lungs clear to auscultation - no rales, rhonchi or wheezes  CV: regular rate and rhythm, normal S1 S2, no S3 or S4, no murmur, click or rub, no peripheral edema and peripheral pulses strong   (female): normal female external genitalia, normal urethral meatus, vaginal mucosa, normal cervix/adnexa/uterus without masses or discharge  MS: no gross musculoskeletal defects noted, no edema  SKIN: no suspicious lesions or rashes  NEURO: Normal strength and tone, mentation intact and speech normal  PSYCH: mentation appears normal, affect normal/bright    Diagnostic Test Results:  Results for orders placed or performed in visit on 06/05/18   Wet prep   Result Value Ref Range    Specimen Description Vagina     Wet Prep Clue cells seen (A)     Wet Prep No Trichomonas seen     Wet Prep No yeast seen         ASSESSMENT/PLAN:                                                      Bernarda was seen today for er f/u and vaginal problem.    Diagnoses and all orders for this visit:    Vaginal discharge  -     Wet prep    Chest wall pain  -     acetaminophen (TYLENOL) 500 MG tablet; Take 2 tablets (1,000 mg) by mouth every 8 hours as needed for mild pain  -     PHYSICAL THERAPY REFERRAL    Acne vulgaris  -     tretinoin (RETIN-A) 0.025 % cream; Spread a pea size amount into affected area topically at bedtime.  Use sunscreen SPF>20.    Seasonal allergic rhinitis, unspecified chronicity, unspecified trigger  -     loratadine (CLARITIN) 10 MG tablet; Take 1 tablet (10 mg) by mouth daily    Bacterial vaginosis  -     metroNIDAZOLE (METROGEL) 0.75 % vaginal gel; Place 1 applicator (5 g) vaginally At Bedtime for 5 days    Costochondritis  -     PHYSICAL THERAPY REFERRAL      - Patient advised to stop the ibuprofen use.  She does not get relief with it and her recent BMP  shows low normal kidney function.   - Tylneol is fine for the chest wall pain, and therefore an Rx has been done today for this.    - Physical therapy is advised for the costochondritis pain.  Also, patient can try over the counter capsicin cream as advised in patient instructions.     - Treatment for the BV advised as well.   - Patient has been advised to abstain from intercourse while taking the Metrogel and to avoid alcohol as well.     - Rx for claritin done for allergies.  Patient reports that the ED wrote an Rx for the Zyrtec, but this was not covered.    - She has been on Claritin in the past with improvement.    - Patient has been advised that she should not take Claritin and zyrtec, only one or the other.     -- I have discussed the patient's diagnosis, and my plan of treatment with the patient and/or family. Patient is aware to followup if symptoms do not improve.  Patient has been advised to be seen sooner or seek more immediate care if symptoms change or worsen.  Patient agrees with and understands the plan today.     See Patient Instructions:  Can try the Capsaicin cream for the chest wall pain.  This can be applied about 2-3 times a day for up to 2 weeks.     Please also followup with physical therapy for the costochondritis.      Please also take the medication for the BV.  Please do not drink alcohol while taking this medication.         Tiffanie Shannon PA-C    [facility]

## 2018-06-05 NOTE — MR AVS SNAPSHOT
After Visit Summary   6/5/2018    Bernarda Garrett    MRN: [id number]           Patient Information     Date Of Birth          1970        Visit Information        Provider Department      6/5/2018 10:20 AM Tiffanie Shannon PA-C [facility]        Today's Diagnoses     Vaginal discharge    -  1    Chest wall pain        Acne vulgaris        Seasonal allergic rhinitis, unspecified chronicity, unspecified trigger        Bacterial vaginosis        Costochondritis          Care Instructions    Can try the Capsaicin cream for the chest wall pain.  This can be applied about 2-3 times a day for up to 2 weeks.     Please also followup with physical therapy for the costochondritis.      Please also take the medication for the BV.  Please do not drink alcohol while taking this medication.               Follow-ups after your visit        Additional Services     PHYSICAL THERAPY REFERRAL       *This therapy referral will be filtered to a centralized scheduling office at [facility] and the patient will receive a call to schedule an appointment at a [facility] location most convenient for them. *     [facility] provides Physical Therapy evaluation and treatment and many specialty services across the [facility] system.  If requesting a specialty program, please choose from the list below.    If you have not heard from the scheduling office within 2 business days, please call [phone] for all locations, with the exception of [place], please call [phone] and [facility], please call [phone]  Treatment: Evaluation & Treatment  Special Instructions/Modalities: None  Special Programs: None    Please be aware that coverage of these services is subject to the terms and limitations of your health insurance plan.  Call member services at your health plan with any benefit or coverage questions.      **Note to Provider:  If you are referring outside of  "[place] for the therapy appointment, please list the name of the location in the \"special instructions\" above, print the referral and give to the patient to schedule the appointment.                  Follow-up notes from your care team     Return in about 2 weeks (around 2018) for Specialty followup, please be seen sooner if needed.      Who to contact     If you have questions or need follow up information about today's clinic visit or your schedule please contact [facility] directly at [phone].  Normal or non-critical lab and imaging results will be communicated to you by [employer], letter or phone within 4 business days after the clinic has received the results. If you do not hear from us within 7 days, please contact the clinic through [employer] or phone. If you have a critical or abnormal lab result, we will notify you by phone as soon as possible.  Submit refill requests through [employer] or call your pharmacy and they will forward the refill request to us. Please allow 3 business days for your refill to be completed.          Additional Information About Your Visit        [employer] Information     [employer] lets you send messages to your doctor, view your test results, renew your prescriptions, schedule appointments and more. To sign up, go to [email or web address] . Click on \"Log in\" on the left side of the screen, which will take you to the Welcome page. Then click on \"Sign up Now\" on the right side of the page.     You will be asked to enter the access code listed below, as well as some personal information. Please follow the directions to create your username and password.     Your access code is: [email or web address]  Expires: 2018 12:32 AM     Your access code will  in 90 days. If you need help or a new code, please call your [place] clinic or [phone].        Care EveryWhere ID     This is your Care EveryWhere ID. This could be used by other organizations to access your " "[facility] medical records  [id number]        Your Vitals Were     Pulse Temperature Height Last Period Pulse Oximetry Breastfeeding?    65 98.8  F (37.1  C) (Oral) 5' 5\" (1.651 m) 05/10/2018 100% No    BMI (Body Mass Index)                   29.95 kg/m2            Blood Pressure from Last 3 Encounters:   06/05/18 118/64   06/03/18 (!) 113/98   03/19/18 144/84    Weight from Last 3 Encounters:   06/05/18 180 lb (81.6 kg)   03/19/18 178 lb (80.7 kg)   02/09/18 178 lb (80.7 kg)              We Performed the Following     PHYSICAL THERAPY REFERRAL     Wet prep          Today's Medication Changes          These changes are accurate as of 6/5/18 12:00 PM.  If you have any questions, ask your nurse or doctor.               Start taking these medicines.        Dose/Directions    loratadine 10 MG tablet   Commonly known as:  CLARITIN   Used for:  Seasonal allergic rhinitis, unspecified chronicity, unspecified trigger   Started by:  Tiffanie Shannon PA-C        Dose:  10 mg   Take 1 tablet (10 mg) by mouth daily   Quantity:  30 tablet   Refills:  1       metroNIDAZOLE 0.75 % vaginal gel   Commonly known as:  METROGEL   Used for:  Bacterial vaginosis   Started by:  Tiffanie Shannon PA-C        Dose:  1 applicator   Place 1 applicator (5 g) vaginally At Bedtime for 5 days   Quantity:  70 g   Refills:  0            Where to get your medicines      These medications were sent to [facility] - [address] [address]     Phone:  [phone]     acetaminophen 500 MG tablet    loratadine 10 MG tablet    metroNIDAZOLE 0.75 % vaginal gel    tretinoin 0.025 % cream                Primary Care Provider Office Phone # Fax #    Tiffanie Shannon PA-C [phone]       [address] [place] [address]   0  [facility] [address]        Equal Access to Services     [facility] KING AH: Hadii aad katherin hadasho Soomaali, waaxda luqadaha, qaybta kaalmada " kev deweylakshmi namanelva joseaan ah. So [facility] [phone].    ATENCIÓN: Si sarah simon, tiene a novoa disposición servicios gratuitos de asistencia lingüística. Thuy al [phone].    We comply with applicable federal civil rights laws and [place] laws. We do not discriminate on the basis of race, color, national origin, age, disability, sex, sexual orientation, or gender identity.            Thank you!     Thank you for choosing [facility]  for your care. Our goal is always to provide you with excellent care. Hearing back from our patients is one way we can continue to improve our services. Please take a few minutes to complete the written survey that you may receive in the mail after your visit with us. Thank you!             Your Updated Medication List - Protect others around you: Learn how to safely use, store and throw away your medicines at www.disposemymeds.org.          This list is accurate as of 6/5/18 12:00 PM.  Always use your most recent med list.                   Brand Name Dispense Instructions for use Diagnosis    acetaminophen 500 MG tablet    TYLENOL    30 tablet    Take 2 tablets (1,000 mg) by mouth every 8 hours as needed for mild pain    Chest wall pain       cetirizine 5 MG tablet    zyrTEC    30 tablet    Take 1 tablet (5 mg) by mouth daily    Seasonal allergic rhinitis, unspecified chronicity, unspecified trigger       cholecalciferol 1000 UNIT tablet    vitamin D3    100 tablet    Take 1 tablet (1,000 Units) by mouth daily    Bipolar affective disorder, remission status unspecified (H)       EPINEPHrine 0.3 MG/0.3ML injection 2-pack    EPIPEN/ADRENACLICK/or ANY BX GENERIC EQUIV    0.6 mL    Inject 0.3 mLs (0.3 mg) into the muscle as needed for anaphylaxis    Peanut allergy       ibuprofen 800 MG tablet    ADVIL/MOTRIN    60 tablet    Take 1 tablet (800 mg) by mouth every 8 hours as needed for moderate pain        lidocaine 5 % Patch    LIDODERM    15  patch    Apply up to 1 patch to painful area for up to 12 h within a 24 h period.  Remove after 12 hours.    Costochondritis       loratadine 10 MG tablet    CLARITIN    30 tablet    Take 1 tablet (10 mg) by mouth daily    Seasonal allergic rhinitis, unspecified chronicity, unspecified trigger       metroNIDAZOLE 0.75 % vaginal gel    METROGEL    70 g    Place 1 applicator (5 g) vaginally At Bedtime for 5 days    Bacterial vaginosis       ranitidine 150 MG tablet    ZANTAC    60 tablet    Take 1 tablet (150 mg) by mouth 2 times daily        sodium chloride-sodium bicarb 2300-700 MG Kit    CLASSIC NETI POT SINUS WASH    1 kit    Spray 1 packet in nostril daily        tretinoin 0.025 % cream    RETIN-A    45 g    Spread a pea size amount into affected area topically at bedtime.  Use sunscreen SPF>20.    Acne vulgaris

## 2018-06-05 NOTE — PATIENT INSTRUCTIONS
Can try the Capsaicin cream for the chest wall pain.  This can be applied about 2-3 times a day for up to 2 weeks.     Please also followup with physical therapy for the costochondritis.      Please also take the medication for the BV.  Please do not drink alcohol while taking this medication.

## 2018-07-02 ENCOUNTER — TELEPHONE (OUTPATIENT)
Dept: FAMILY MEDICINE | Facility: CLINIC | Age: 48
End: 2018-07-02

## 2018-07-13 ENCOUNTER — TELEPHONE (OUTPATIENT)
Dept: FAMILY MEDICINE | Facility: CLINIC | Age: 48
End: 2018-07-13

## 2018-07-13 DIAGNOSIS — R26.89 BALANCE PROBLEMS: Primary | ICD-10-CM

## 2018-07-13 NOTE — TELEPHONE ENCOUNTER
Received fax from [email or web address], [facility]-[phone]    Requesting bath bench with back 400lb capacity.    Please order DME.    Fax order to [phone]    Rody Calvert RN  [facility]

## 2018-07-13 NOTE — TELEPHONE ENCOUNTER
Nani, , with [facility] calling    Stated patient is now having some stress incontinence so they will be ordering patient incontinent liners.   Stated patient is also in need of a standard shower chair with a back    Will be faxing the orders to SAUM[facility]CANTAR    Ph. [phone] (OK to leave a detailed VM)    Routing to PCP as an RYANNE Brannon RN  [facility]

## 2018-07-16 NOTE — TELEPHONE ENCOUNTER
Order is pending, please print and fax to appropriate location or have patient  hard copy of Rx.      Thank you,     Tiffanie

## 2018-07-25 ENCOUNTER — TELEPHONE (OUTPATIENT)
Dept: FAMILY MEDICINE | Facility: CLINIC | Age: 48
End: 2018-07-25

## 2018-07-25 NOTE — TELEPHONE ENCOUNTER
Date Forms was received: July 25, 2018    Forms received by: Fax    Last office visit: 06/05/2018    Purpose of Form:  Supply orders for Incontinence    When the form is due:  asap    How the form needs to be returned for patient:  Fax - [phone]    Form currently placed  [facility] Tiffanie's desk Bin on R side

## 2018-07-27 NOTE — TELEPHONE ENCOUNTER
Completed forms faxed back to [facility] medical at [phone].   Originals sent to be scanned.     Oriana Sanderson

## 2018-07-29 ENCOUNTER — HOSPITAL ENCOUNTER (EMERGENCY)
Facility: CLINIC | Age: 48
Discharge: HOME | End: 2018-07-29
Attending: EMERGENCY MEDICINE | Admitting: EMERGENCY MEDICINE
Payer: MEDICARE

## 2018-07-29 ENCOUNTER — APPOINTMENT (OUTPATIENT)
Dept: GENERAL RADIOLOGY | Facility: CLINIC | Age: 48
End: 2018-07-29
Attending: EMERGENCY MEDICINE
Payer: MEDICARE

## 2018-07-29 VITALS
BODY MASS INDEX: 29.66 KG/M2 | RESPIRATION RATE: 12 BRPM | TEMPERATURE: 98.2 F | DIASTOLIC BLOOD PRESSURE: 91 MMHG | WEIGHT: 178 LBS | OXYGEN SATURATION: 98 % | SYSTOLIC BLOOD PRESSURE: 137 MMHG | HEIGHT: 65 IN | HEART RATE: 60 BPM

## 2018-07-29 DIAGNOSIS — R07.9 ACUTE CHEST PAIN: ICD-10-CM

## 2018-07-29 LAB
ANION GAP SERPL CALCULATED.3IONS-SCNC: 6 MMOL/L (ref 3–14)
BASOPHILS # BLD AUTO: 0 10E9/L (ref 0–0.2)
BASOPHILS NFR BLD AUTO: 1.1 %
BUN SERPL-MCNC: 13 MG/DL (ref 7–30)
CALCIUM SERPL-MCNC: 7.6 MG/DL (ref 8.5–10.1)
CHLORIDE SERPL-SCNC: 109 MMOL/L (ref 94–109)
CO2 SERPL-SCNC: 24 MMOL/L (ref 20–32)
CREAT SERPL-MCNC: 0.95 MG/DL (ref 0.52–1.04)
DIFFERENTIAL METHOD BLD: ABNORMAL
EOSINOPHIL # BLD AUTO: 0.1 10E9/L (ref 0–0.7)
EOSINOPHIL NFR BLD AUTO: 2.7 %
ERYTHROCYTE [DISTWIDTH] IN BLOOD BY AUTOMATED COUNT: 15 % (ref 10–15)
GFR SERPL CREATININE-BSD FRML MDRD: 63 ML/MIN/1.7M2
GLUCOSE SERPL-MCNC: 105 MG/DL (ref 70–99)
HCT VFR BLD AUTO: 38.7 % (ref 35–47)
HGB BLD-MCNC: 12.2 G/DL (ref 11.7–15.7)
IMM GRANULOCYTES # BLD: 0 10E9/L (ref 0–0.4)
IMM GRANULOCYTES NFR BLD: 0.3 %
INTERPRETATION ECG - MUSE: NORMAL
LYMPHOCYTES # BLD AUTO: 1.7 10E9/L (ref 0.8–5.3)
LYMPHOCYTES NFR BLD AUTO: 45.1 %
MCH RBC QN AUTO: 24.7 PG (ref 26.5–33)
MCHC RBC AUTO-ENTMCNC: 31.5 G/DL (ref 31.5–36.5)
MCV RBC AUTO: 79 FL (ref 78–100)
MONOCYTES # BLD AUTO: 0.3 10E9/L (ref 0–1.3)
MONOCYTES NFR BLD AUTO: 8.6 %
NEUTROPHILS # BLD AUTO: 1.6 10E9/L (ref 1.6–8.3)
NEUTROPHILS NFR BLD AUTO: 42.2 %
NRBC # BLD AUTO: 0 10*3/UL
NRBC BLD AUTO-RTO: 0 /100
PLATELET # BLD AUTO: 279 10E9/L (ref 150–450)
POTASSIUM SERPL-SCNC: 4 MMOL/L (ref 3.4–5.3)
RBC # BLD AUTO: 4.93 10E12/L (ref 3.8–5.2)
SODIUM SERPL-SCNC: 139 MMOL/L (ref 133–144)
TROPONIN I SERPL-MCNC: <0.015 UG/L (ref 0–0.04)
TROPONIN I SERPL-MCNC: <0.015 UG/L (ref 0–0.04)
WBC # BLD AUTO: 3.7 10E9/L (ref 4–11)

## 2018-07-29 PROCEDURE — 85025 COMPLETE CBC W/AUTO DIFF WBC: CPT | Performed by: EMERGENCY MEDICINE

## 2018-07-29 PROCEDURE — A9270 NON-COVERED ITEM OR SERVICE: HCPCS | Mod: GY | Performed by: EMERGENCY MEDICINE

## 2018-07-29 PROCEDURE — 84484 ASSAY OF TROPONIN QUANT: CPT | Mod: 91 | Performed by: EMERGENCY MEDICINE

## 2018-07-29 PROCEDURE — 93005 ELECTROCARDIOGRAM TRACING: CPT

## 2018-07-29 PROCEDURE — 99284 EMERGENCY DEPT VISIT MOD MDM: CPT | Mod: 25

## 2018-07-29 PROCEDURE — 71046 X-RAY EXAM CHEST 2 VIEWS: CPT

## 2018-07-29 PROCEDURE — 80048 BASIC METABOLIC PNL TOTAL CA: CPT | Performed by: EMERGENCY MEDICINE

## 2018-07-29 PROCEDURE — 25000132 ZZH RX MED GY IP 250 OP 250 PS 637: Mod: GY | Performed by: EMERGENCY MEDICINE

## 2018-07-29 RX ORDER — IBUPROFEN 200 MG
400 TABLET ORAL ONCE
Status: COMPLETED | OUTPATIENT
Start: 2018-07-29 | End: 2018-07-29

## 2018-07-29 RX ADMIN — IBUPROFEN 400 MG: 200 TABLET, FILM COATED ORAL at 11:37

## 2018-07-29 ASSESSMENT — ENCOUNTER SYMPTOMS
NAUSEA: 0
CHEST TIGHTNESS: 0
PALPITATIONS: 0
SHORTNESS OF BREATH: 0
NUMBNESS: 0
COUGH: 0
DIZZINESS: 0
FEVER: 0

## 2018-07-29 NOTE — ED AVS SNAPSHOT
[facility] Emergency Department    [address]    [facility] [address]    Phone:  [phone]    Fax:  [phone]                                       Bernarda Garrett   MRN: [id number]    Department:  [facility] Emergency Department   Date of Visit:  7/29/2018           After Visit Summary Signature Page     I have received my discharge instructions, and my questions have been answered. I have discussed any challenges I see with this plan with the nurse or doctor.    ..........................................................................................................................................  Patient/Patient Representative Signature      ..........................................................................................................................................  Patient Representative Print Name and Relationship to Patient    ..................................................               ................................................  Date                                            Time    ..........................................................................................................................................  Reviewed by Signature/Title    ...................................................              ..............................................  Date                                                            Time

## 2018-07-29 NOTE — ED AVS SNAPSHOT
[facility] Emergency Department    [address]    [facility] [address]    Phone:  [phone]    Fax:  [phone]                                       Bernarda Garrett   MRN: [id number]    Department:  [facility] Emergency Department   Date of Visit:  7/29/2018           Patient Information     Date Of Birth          1970        Your diagnoses for this visit were:     Acute chest pain        You were seen by Nelia Vaughn MD.      Follow-up Information     Follow up with Tiffanie Shannon PA-C In 2 days.    Specialty:  Physician Assistant    Contact information:    [address] [place] [address]  [phone]          Discharge Instructions       Discharge Instructions  Chest Pain    You have been seen today for chest pain or discomfort.  At this time, your provider has found no signs that your chest pain is due to a serious or life-threatening condition, (or you have declined more testing and/or admission to the hospital). However, sometimes there is a serious problem that does not show up right away. Your evaluation today may not be complete and you may need further testing and evaluation.     Generally, every Emergency Department visit should have a follow-up clinic visit with either a primary or a specialty clinic/provider. Please follow-up as instructed by your emergency provider today.  Return to the Emergency Department if:    Your chest pain changes, gets worse, starts to happen more often, or comes with less activity.    You are newly short of breath.    You get very weak or tired.    You pass out or faint.    You have any new symptoms, like fever, cough, numb legs, or you cough up blood.    You have anything else that worries you.    Until you follow-up with your regular provider, please do the following:    Take one aspirin daily unless you have an allergy or are told not to by your provider.    If a stress test appointment has been made, go  to the appointment.    If you have questions, contact your regular provider.    Follow-up with your regular provider/clinic as directed; this is very important.    If you were given a prescription for medicine here today, be sure to read all of the information (including the package insert) that comes with your prescription.  This will include important information about the medicine, its side effects, and any warnings that you need to know about.  The pharmacist who fills the prescription can provide more information and answer questions you may have about the medicine.  If you have questions or concerns that the pharmacist cannot address, please call or return to the Emergency Department.       Remember that you can always come back to the Emergency Department if you are not able to see your regular provider in the amount of time listed above, if you get any new symptoms, or if there is anything that worries you.      24 Hour Appointment Hotline       To make an appointment at any [facility], call [phone] ([phone]). If you don't have a family doctor or clinic, we will help you find one. [facility] are conveniently located to serve the needs of you and your family.          ED Discharge Orders     Exercise Stress Echocardiogram       Administration of IV contrast will be tailored to this examination per the appropriate written protocol listed in the Echocardiography department Protocol Book, or by the supervising Cardiologist. This may result in an order change.    Use of contrast is at the discretion of the supervising Cardiologist.                     Review of your medicines      Our records show that you are taking the medicines listed below. If these are incorrect, please call your family doctor or clinic.        Dose / Directions Last dose taken    acetaminophen 500 MG tablet   Commonly known as:  TYLENOL   Dose:  1000 mg   Quantity:  30 tablet        Take 2 tablets (1,000 mg) by  mouth every 8 hours as needed for mild pain   Refills:  0        cholecalciferol 1000 UNIT tablet   Commonly known as:  vitamin D3   Dose:  1000 Units   Quantity:  100 tablet        Take 1 tablet (1,000 Units) by mouth daily   Refills:  3        EPINEPHrine 0.3 MG/0.3ML injection 2-pack   Commonly known as:  EPIPEN/ADRENACLICK/or ANY BX GENERIC EQUIV   Dose:  0.3 mg   Quantity:  0.6 mL        Inject 0.3 mLs (0.3 mg) into the muscle as needed for anaphylaxis   Refills:  1        lidocaine 5 % Patch   Commonly known as:  LIDODERM   Quantity:  15 patch        Apply up to 1 patch to painful area for up to 12 h within a 24 h period.  Remove after 12 hours.   Refills:  1        loratadine 10 MG tablet   Commonly known as:  CLARITIN   Dose:  10 mg   Quantity:  30 tablet        Take 1 tablet (10 mg) by mouth daily   Refills:  1        order for DME   Quantity:  1 Units        Equipment being ordered: Shower chair with back, 400lb weight capacity   Refills:  0        ranitidine 150 MG tablet   Commonly known as:  ZANTAC   Dose:  150 mg   Quantity:  60 tablet        Take 1 tablet (150 mg) by mouth 2 times daily   Refills:  1        sodium chloride-sodium bicarb 2300-700 MG Kit   Commonly known as:  CLASSIC NETI POT SINUS WASH   Dose:  1 packet   Quantity:  1 kit        Spray 1 packet in nostril daily   Refills:  0        tretinoin 0.025 % cream   Commonly known as:  RETIN-A   Quantity:  45 g        Spread a pea size amount into affected area topically at bedtime.  Use sunscreen SPF>20.   Refills:  11                Procedures and tests performed during your visit     Basic metabolic panel    CBC with platelets differential    EKG 12 lead    Troponin I    Troponin I (now)    XR Chest 2 Views      Orders Needing Specimen Collection     None      Pending Results     No orders found from 7/27/2018 to 7/30/2018.            Pending Culture Results     No orders found from 7/27/2018 to 7/30/2018.            Pending Results  Instructions     If you had any lab results that were not finalized at the time of your Discharge, you can call the ED Lab Result RN at [phone]. You will be contacted by this team for any positive Lab results or changes in treatment. The nurses are available 7 days a week from 10A to 6:30P.  You can leave a message 24 hours per day and they will return your call.        Test Results From Your Hospital Stay        7/29/2018  8:51 AM      Component Results     Component Value Ref Range & Units Status    WBC 3.7 (L) 4.0 - 11.0 10e9/L Final    RBC Count 4.93 3.8 - 5.2 10e12/L Final    Hemoglobin 12.2 11.7 - 15.7 g/dL Final    Hematocrit 38.7 35.0 - 47.0 % Final    MCV 79 78 - 100 fl Final    MCH 24.7 (L) 26.5 - 33.0 pg Final    MCHC 31.5 31.5 - 36.5 g/dL Final    RDW 15.0 10.0 - 15.0 % Final    Platelet Count 279 150 - 450 10e9/L Final    Diff Method Automated Method  Final    % Neutrophils 42.2 % Final    % Lymphocytes 45.1 % Final    % Monocytes 8.6 % Final    % Eosinophils 2.7 % Final    % Basophils 1.1 % Final    % Immature Granulocytes 0.3 % Final    Nucleated RBCs 0 0 /100 Final    Absolute Neutrophil 1.6 1.6 - 8.3 10e9/L Final    Absolute Lymphocytes 1.7 0.8 - 5.3 10e9/L Final    Absolute Monocytes 0.3 0.0 - 1.3 10e9/L Final    Absolute Eosinophils 0.1 0.0 - 0.7 10e9/L Final    Absolute Basophils 0.0 0.0 - 0.2 10e9/L Final    Abs Immature Granulocytes 0.0 0 - 0.4 10e9/L Final    Absolute Nucleated RBC 0.0  Final         7/29/2018  9:09 AM      Component Results     Component Value Ref Range & Units Status    Sodium 139 133 - 144 mmol/L Final    Potassium 4.0 3.4 - 5.3 mmol/L Final    Chloride 109 94 - 109 mmol/L Final    Carbon Dioxide 24 20 - 32 mmol/L Final    Anion Gap 6 3 - 14 mmol/L Final    Glucose 105 (H) 70 - 99 mg/dL Final    Urea Nitrogen 13 7 - 30 mg/dL Final    Creatinine 0.95 0.52 - 1.04 mg/dL Final    GFR Estimate 63 >60 mL/min/1.7m2 Final    Non  GFR Calc    GFR Estimate If  Black 76 >60 mL/min/1.7m2 Final    African American GFR Calc    Calcium 7.6 (L) 8.5 - 10.1 mg/dL Final         7/29/2018  9:09 AM      Component Results     Component Value Ref Range & Units Status    Troponin I ES <0.015 0.000 - 0.045 ug/L Final    The 99th percentile for upper reference range is 0.045 ug/L.  Troponin values   in the range of 0.045 - 0.120 ug/L may be associated with risks of adverse   clinical events.           7/29/2018  9:45 AM      Narrative     CHEST TWO VIEWS  7/29/2018 9:41 AM     HISTORY:  Chest pain.    COMPARISON: 6/2/2018.        Impression     IMPRESSION: Chest is negative and unchanged. Lungs clear.    RAMESH ALVAREZ MD         7/29/2018 11:58 AM      Component Results     Component Value Ref Range & Units Status    Troponin I ES <0.015 0.000 - 0.045 ug/L Final    The 99th percentile for upper reference range is 0.045 ug/L.  Troponin values   in the range of 0.045 - 0.120 ug/L may be associated with risks of adverse   clinical events.                  Clinical Quality Measure: Blood Pressure Screening     Your blood pressure was checked while you were in the emergency department today. The last reading we obtained was  BP: (!) 137/91 . Please read the guidelines below about what these numbers mean and what you should do about them.  If your systolic blood pressure (the top number) is less than 120 and your diastolic blood pressure (the bottom number) is less than 80, then your blood pressure is normal. There is nothing more that you need to do about it.  If your systolic blood pressure (the top number) is 120-139 or your diastolic blood pressure (the bottom number) is 80-89, your blood pressure may be higher than it should be. You should have your blood pressure rechecked within a year by a primary care provider.  If your systolic blood pressure (the top number) is 140 or greater or your diastolic blood pressure (the bottom number) is 90 or greater, you may have high blood pressure.  "High blood pressure is treatable, but if left untreated over time it can put you at risk for heart attack, stroke, or kidney failure. You should have your blood pressure rechecked by a primary care provider within the next 4 weeks.  If your provider in the emergency department today gave you specific instructions to follow-up with your doctor or provider even sooner than that, you should follow that instruction and not wait for up to 4 weeks for your follow-up visit.        Thank you for choosing [facility]       Thank you for choosing [facility] for your care. Our goal is always to provide you with excellent care. Hearing back from our patients is one way we can continue to improve our services. Please take a few minutes to complete the written survey that you may receive in the mail after you visit with us. Thank you!        [facility] Information     [employer] lets you send messages to your doctor, view your test results, renew your prescriptions, schedule appointments and more. To sign up, go to [email or web address] . Click on \"Log in\" on the left side of the screen, which will take you to the Welcome page. Then click on \"Sign up Now\" on the right side of the page.     You will be asked to enter the access code listed below, as well as some personal information. Please follow the directions to create your username and password.     Your access code is: [email or web address]  Expires: 2018 12:32 AM     Your access code will  in 90 days. If you need help or a new code, please call your [facility] clinic or [phone].        Care EveryWhere ID     This is your Care EveryWhere ID. This could be used by other organizations to access your [facility] medical records  [id number]        Equal Access to Services     STEPHANIE MALIK : Kyle Arrieta, javier treviño, kev smith. So [facility] [phone].    ATENCIÓN: Si habla español, tiene a novoa disposición " servicios gratuitos de asistencia lingüística. Thuy chavira [phone].    We comply with applicable federal civil rights laws and [place] laws. We do not discriminate on the basis of race, color, national origin, age, disability, sex, sexual orientation, or gender identity.            After Visit Summary       This is your record. Keep this with you and show to your community pharmacist(s) and doctor(s) at your next visit.

## 2018-07-29 NOTE — ED PROVIDER NOTES
History     Chief Complaint:  Chest Pain    HPI   Bernarda Garrett is a 48 year old female who presents with chest pain. The patient reports that she woke up this morning at 0700 with left sided chest pain.  It has been constant since then per patient has a history of costochondritis and states that this pain feels different than her past presentations of costochondritis. She denies any numbness, shortness of breath, headache, or any other symptoms. She has a history of hypertension but is not on hypertensive medications.  It does hurt to touch.  She has not received the stress test that she was ordered previously.    CARDIAC RISK FACTORS:  Sex:    F  Tobacco:   N  Hypertension:   Y  Hyperlipidemia:  N  Diabetes:   N  Family History:  N    PE/DVT RISK FACTORS:  Sex:    F  Hormones:   N  Tobacco:   N  Cancer:   N  Travel:   N  Surgery:   N  Other immobilization: N  Personal history:  N  Family history:  N    Allergies:  Peanuts [Nuts]  Dilaudid [Hydromorphone]  Minocycline  Latex     Medications:    Ibuprofen  Tylenol     Past Medical History:    Hypertension     Past Surgical History:    No pertinent past surgical history.    Family History:    Mother-heart failure    Social History:  Smoking status: Never smoker  Alcohol use: No  Marital Status:        Review of Systems   Constitutional: Negative for fever.   Respiratory: Negative for cough, chest tightness and shortness of breath.    Cardiovascular: Positive for chest pain. Negative for palpitations and leg swelling.   Gastrointestinal: Negative for nausea.   Neurological: Negative for dizziness and numbness.   All other systems reviewed and are negative.    Physical Exam     Patient Vitals for the past 24 hrs:   BP Temp Temp src Pulse Heart Rate Resp SpO2 Height Weight   07/29/18 1130 (!) 137/91 - - - 67 12 98 % - -   07/29/18 1115 - - - - 66 16 100 % - -   07/29/18 1100 136/82 - - - 62 15 93 % - -   07/29/18 1045 - - - - 58 13 100 % - -   07/29/18 1030  "128/77 - - - 57 14 98 % - -   07/29/18 1015 - - - - 58 13 100 % - -   07/29/18 1000 (!) 152/96 - - - 63 17 99 % - -   07/29/18 0930 (!) 135/98 - - - 61 13 96 % - -   07/29/18 0915 - - - - 63 15 100 % - -   07/29/18 0900 (!) 138/96 - - - 61 20 100 % - -   07/29/18 0845 - - - - 64 14 99 % - -   07/29/18 0830 (!) 117/92 - - - 61 11 98 % - -   07/29/18 0817 142/89 98.2  F (36.8  C) Oral 60 60 16 100 % 1.651 m (5' 5\") 80.7 kg (178 lb)   07/29/18 0815 - - - - 66 14 95 % - -         Physical Exam  General: Patient is alert and interactive when I enter the room  Head:  The scalp, face, and head appear normal  Eyes:  Conjunctivae are normal  ENT:    The nose is normal    Pinnae are normal    External acoustic canals are normal  Neck:  Trachea midline  CV:  Pulses are normal, RRR.    Resp:  No respiratory distress, CTAB   Abdomen:      Soft, non-tender, non-distended  Musc:  Normal muscular tone    No major joint effusions, reproducible chest pain on the left.  Skin:  No rash or lesions noted  Neuro:  Speech is normal and fluent. Face is symmetric.     Moving all extremities well.   Psych: Awake. Alert.  Normal affect.  Appropriate interactions.    Emergency Department Course   ECG:  @ 0817  Indication: chest pain  Vent. Rate 69 bpm. ID interval 144 ms. QRS duration 82 ms. QT/QTc 412/441 ms. P-R-T axis 57 -10 27.   Normal sinus rhythm with sinus arrhythmia. Nonspecific T wave abnormality. Abnormal ECG.    Read @ 0817 by Dr. Vaughn.    Imaging:  XR Chest 2 views  IMPRESSION: Chest is negative and unchanged. Lungs clear.  Report per radiology.    Laboratory:  CBC:  WBC 3.7 (L), HGB 12.2, ,  BMP: Glucose 105 (H), Calcium 7.6 (L), otherwise WNL (Creatinine 0.95)  (0827) Troponin I: <0.015    Interventions:  (1137) Ibuprofen, 400 mg, PO    Emergency Department Course:  Nursing notes and vitals reviewed.  (5410) I performed an exam of the patient as documented above.    Blood was drawn from the patient. This was sent for " laboratory testing, findings above.     Findings and plan explained to the patient. Patient discharged home with instructions regarding supportive care, medications, and reasons to return. The importance of close follow-up was reviewed.   Impression & Plan    Medical Decision Making:  Bernarda Garrett is a 48 year old female who presents with chest pain.  The work up in the Emergency Department is negative.  The differential diagnosis of chest pain is broad and includes life threatening etiologies such as Acute coronary syndrome, Myocardial infarction, Pulmonary Embolism, and Acute Aortic Dissection.  Other causes may include pneumonia, pneumothorax, pericarditis, pleurisy, and esophageal spasm.  No serious etiology for the chest pain was detected today during this visit.  Patient has been seen multiple times for this and each time seems to report this is different or costochondritis.  However is significantly reducible on exam.  Her chest pain started shortly before arrival so we did do a delta troponin which was negative.  Risk factors for cardiac disease is hypertension.  She does have a heart score of 3 so she could be managed as an outpatient.  Patient just wants to know what is going on in 1 to know if this is her heart or not.  Here EKG looks fairly similar to her prior EKGs when she has had this chest pain.  Given that I have checked her troponin ×2 and is not a very concerning story for chest pain given that is very reproducible on exam and she has had this previously.  I do not think patient needs to be admitted for chest pain rule out.  I do think patient needs a stress test to help further discern whether or not this is cardiac.  I stated that patient needs to get the stress test soon.  She was agreeable to getting it.  Patient discharged with close primary care follow-up and plan for stress test.    Diagnosis:    ICD-10-CM   1. Acute chest pain R07.9       Disposition:  Patient is discharged to home.      I,  Mihai Gonzalez, am serving as a scribe on 7/29/2018 at 8:14 AM to personally document services performed by Dr. Vaughn based on my observations and the provider's statements to me.       Mihai Gonzalez  7/29/2018   [facility] EMERGENCY DEPARTMENT       Nelia Vaughn MD  07/30/18 [id number]

## 2018-07-29 NOTE — ED TRIAGE NOTES
ABCs intact. Pt c/o substernal cp radiating to L neck, jaw and arm. Pt hx costochondritis. Pt BIBA. Pt had 324mg aspirin and 2 0.4mg ntg SL by EMS.

## 2018-07-31 ENCOUNTER — HOSPITAL ENCOUNTER (OUTPATIENT)
Dept: CARDIOLOGY | Facility: CLINIC | Age: 48
Discharge: HOME | End: 2018-07-31
Attending: EMERGENCY MEDICINE | Admitting: EMERGENCY MEDICINE
Payer: MEDICARE

## 2018-07-31 DIAGNOSIS — R07.9 ACUTE CHEST PAIN: ICD-10-CM

## 2018-07-31 PROCEDURE — 93350 STRESS TTE ONLY: CPT | Mod: 26 | Performed by: INTERNAL MEDICINE

## 2018-07-31 PROCEDURE — 93018 CV STRESS TEST I&R ONLY: CPT | Performed by: INTERNAL MEDICINE

## 2018-07-31 PROCEDURE — 93017 CV STRESS TEST TRACING ONLY: CPT

## 2018-07-31 PROCEDURE — 93016 CV STRESS TEST SUPVJ ONLY: CPT | Performed by: INTERNAL MEDICINE

## 2018-07-31 PROCEDURE — 93321 DOPPLER ECHO F-UP/LMTD STD: CPT | Mod: 26 | Performed by: INTERNAL MEDICINE

## 2018-07-31 PROCEDURE — 93325 DOPPLER ECHO COLOR FLOW MAPG: CPT | Mod: 26 | Performed by: INTERNAL MEDICINE

## 2018-09-04 ENCOUNTER — APPOINTMENT (OUTPATIENT)
Dept: GENERAL RADIOLOGY | Facility: CLINIC | Age: 48
End: 2018-09-04
Attending: EMERGENCY MEDICINE
Payer: MEDICARE

## 2018-09-04 ENCOUNTER — HOSPITAL ENCOUNTER (EMERGENCY)
Facility: CLINIC | Age: 48
Discharge: HOME | End: 2018-09-05
Attending: EMERGENCY MEDICINE | Admitting: EMERGENCY MEDICINE
Payer: MEDICARE

## 2018-09-04 DIAGNOSIS — R07.89 ATYPICAL CHEST PAIN: ICD-10-CM

## 2018-09-04 LAB
ANION GAP SERPL CALCULATED.3IONS-SCNC: 7 MMOL/L (ref 3–14)
BASOPHILS # BLD AUTO: 0 10E9/L (ref 0–0.2)
BASOPHILS NFR BLD AUTO: 0.4 %
BUN SERPL-MCNC: 13 MG/DL (ref 7–30)
CALCIUM SERPL-MCNC: 7.9 MG/DL (ref 8.5–10.1)
CHLORIDE SERPL-SCNC: 107 MMOL/L (ref 94–109)
CO2 SERPL-SCNC: 26 MMOL/L (ref 20–32)
CREAT SERPL-MCNC: 1.05 MG/DL (ref 0.52–1.04)
DIFFERENTIAL METHOD BLD: ABNORMAL
EOSINOPHIL # BLD AUTO: 0 10E9/L (ref 0–0.7)
EOSINOPHIL NFR BLD AUTO: 0.5 %
ERYTHROCYTE [DISTWIDTH] IN BLOOD BY AUTOMATED COUNT: 15.4 % (ref 10–15)
GFR SERPL CREATININE-BSD FRML MDRD: 56 ML/MIN/1.7M2
GLUCOSE SERPL-MCNC: 94 MG/DL (ref 70–99)
HCG SERPL QL: NEGATIVE
HCT VFR BLD AUTO: 34.4 % (ref 35–47)
HGB BLD-MCNC: 11 G/DL (ref 11.7–15.7)
IMM GRANULOCYTES # BLD: 0 10E9/L (ref 0–0.4)
IMM GRANULOCYTES NFR BLD: 0.2 %
LYMPHOCYTES # BLD AUTO: 2.5 10E9/L (ref 0.8–5.3)
LYMPHOCYTES NFR BLD AUTO: 44.2 %
MCH RBC QN AUTO: 25.2 PG (ref 26.5–33)
MCHC RBC AUTO-ENTMCNC: 32 G/DL (ref 31.5–36.5)
MCV RBC AUTO: 79 FL (ref 78–100)
MONOCYTES # BLD AUTO: 0.6 10E9/L (ref 0–1.3)
MONOCYTES NFR BLD AUTO: 10 %
NEUTROPHILS # BLD AUTO: 2.6 10E9/L (ref 1.6–8.3)
NEUTROPHILS NFR BLD AUTO: 44.7 %
NRBC # BLD AUTO: 0 10*3/UL
NRBC BLD AUTO-RTO: 0 /100
PLATELET # BLD AUTO: 283 10E9/L (ref 150–450)
POTASSIUM SERPL-SCNC: 3.8 MMOL/L (ref 3.4–5.3)
RBC # BLD AUTO: 4.36 10E12/L (ref 3.8–5.2)
SODIUM SERPL-SCNC: 140 MMOL/L (ref 133–144)
TROPONIN I SERPL-MCNC: <0.015 UG/L (ref 0–0.04)
WBC # BLD AUTO: 5.7 10E9/L (ref 4–11)

## 2018-09-04 PROCEDURE — 93005 ELECTROCARDIOGRAM TRACING: CPT

## 2018-09-04 PROCEDURE — 93005 ELECTROCARDIOGRAM TRACING: CPT | Mod: 76

## 2018-09-04 PROCEDURE — 25000128 H RX IP 250 OP 636: Performed by: EMERGENCY MEDICINE

## 2018-09-04 PROCEDURE — 99285 EMERGENCY DEPT VISIT HI MDM: CPT | Mod: 25

## 2018-09-04 PROCEDURE — 84703 CHORIONIC GONADOTROPIN ASSAY: CPT | Performed by: EMERGENCY MEDICINE

## 2018-09-04 PROCEDURE — 85025 COMPLETE CBC W/AUTO DIFF WBC: CPT | Performed by: EMERGENCY MEDICINE

## 2018-09-04 PROCEDURE — 80048 BASIC METABOLIC PNL TOTAL CA: CPT | Performed by: EMERGENCY MEDICINE

## 2018-09-04 PROCEDURE — 71046 X-RAY EXAM CHEST 2 VIEWS: CPT

## 2018-09-04 PROCEDURE — 84484 ASSAY OF TROPONIN QUANT: CPT | Performed by: EMERGENCY MEDICINE

## 2018-09-04 RX ORDER — KETOROLAC TROMETHAMINE 15 MG/ML
10 INJECTION, SOLUTION INTRAMUSCULAR; INTRAVENOUS ONCE
Status: COMPLETED | OUTPATIENT
Start: 2018-09-04 | End: 2018-09-04

## 2018-09-04 RX ADMIN — KETOROLAC TROMETHAMINE 10 MG: 15 INJECTION, SOLUTION INTRAMUSCULAR; INTRAVENOUS at 22:52

## 2018-09-04 NOTE — ED AVS SNAPSHOT
[facility] Emergency Department    [address] [place] [address]    Phone:  [phone]    Fax:  [phone]                                       Bernarda Garrett   MRN: [id number]    Department:  [facility] Emergency Department   Date of Visit:  9/4/2018           Patient Information     Date Of Birth          1970        Your diagnoses for this visit were:     Atypical chest pain        You were seen by Anuj Reyes MD.      Follow-up Information     Follow up with Tiffanie Shannon PA-C. Call in 3 days.    Specialty:  Physician Assistant    Contact information:    [address] [place] [address]   0  [facility] [address]  [phone]          Discharge Instructions       Discharge Instructions  Chest Pain    You have been seen today for chest pain or discomfort.  At this time, your doctor has found no signs that your chest pain is due to a serious or life-threatening condition, (or you have declined more testing and/or admission to the hospital). However, sometimes there is a serious problem that does not show up right away. Your evaluation today may not be complete and you may need further testing and evaluation.     You need to follow-up with your regular doctor within 3 days.    Return to the Emergency Department if:    Your chest pain changes, gets worse, starts to happen more often, or comes with less activity.    You are short of breath.    You get very weak or tired.    You pass out or faint.    You have any new symptoms, like fever, cough, numb legs, or you cough up blood.    You have anything else that worries you.    Until you follow-up with your regular doctor please do the following:    Take one aspirin daily unless you have an allergy or are told not to by your doctor.    If a stress test appointment has been made, go to the appointment.    If you have questions, contact your regular doctor.    If your doctor today has told you to follow-up with your regular  doctor, it is very important that you make an appointment with your clinic and go to the appointment.  If you do not follow-up with your primary doctor, it may result in missing an important development which could result in permanent injury or disability and/or lasting pain.  If there is any problem keeping your appointment, call your doctor or return to the Emergency Department.    If you were given a prescription for medicine here today, be sure to read all of the information (including the package insert) that comes with your prescription.  This will include important information about the medicine, its side effects, and any warnings that you need to know about.  The pharmacist who fills the prescription can provide more information and answer questions you may have about the medicine.  If you have questions or concerns that the pharmacist cannot address, please call or return to the Emergency Department.     Opioid Medication Information    Pain medications are among the most commonly prescribed medicines, so we are including this information for all our patients. If you did not receive pain medication or get a prescription for pain medicine, you can ignore it.     You may have been given a prescription for an opioid (narcotic) pain medicine and/or have received a pain medicine while here in the Emergency Department. These medicines can make you drowsy or impaired. You must not drive, operate dangerous equipment, or engage in any other dangerous activities while taking these medications. If you drive while taking these medications, you could be arrested for DUI, or driving under the influence. Do not drink any alcohol while you are taking these medications.     Opioid pain medications can cause addiction. If you have a history of chemical dependency of any type, you are at a higher risk of becoming addicted to pain medications.  Only take these prescribed medications to treat your pain when all other options  have been tried. Take it for as short a time and as few doses as possible. Store your pain pills in a secure place, as they are frequently stolen and provide a dangerous opportunity for children or visitors in your house to start abusing these powerful medications. We will not replace any lost or stolen medicine.  As soon as your pain is better, you should flush all your remaining medication.     Many prescription pain medications contain Tylenol  (acetaminophen), including Vicodin , Tylenol #3 , Norco , Lortab , and Percocet .  You should not take any extra pills of Tylenol  if you are using these prescription medications or you can get very sick.  Do not ever take more than 3000 mg of acetaminophen in any 24 hour period.    All opioids tend to cause constipation. Drink plenty of water and eat foods that have a lot of fiber, such as fruits, vegetables, prune juice, apple juice and high fiber cereal.  Take a laxative if you don t move your bowels at least every other day. Miralax , Milk of Magnesia, Colace , or Senna  can be used to keep you regular.      Remember that you can always come back to the Emergency Department if you are not able to see your regular doctor in the amount of time listed above, if you get any new symptoms, or if there is anything that worries you.          24 Hour Appointment Hotline       To make an appointment at any [facility], call [phone] ([phone]). If you don't have a family doctor or clinic, we will help you find one. [facility] are conveniently located to serve the needs of you and your family.             Review of your medicines      Our records show that you are taking the medicines listed below. If these are incorrect, please call your family doctor or clinic.        Dose / Directions Last dose taken    acetaminophen 500 MG tablet   Commonly known as:  TYLENOL   Dose:  1000 mg   Quantity:  30 tablet        Take 2 tablets (1,000 mg) by mouth every 8 hours  as needed for mild pain   Refills:  0        cholecalciferol 1000 UNIT tablet   Commonly known as:  vitamin D3   Dose:  1000 Units   Quantity:  100 tablet        Take 1 tablet (1,000 Units) by mouth daily   Refills:  3        EPINEPHrine 0.3 MG/0.3ML injection 2-pack   Commonly known as:  EPIPEN/ADRENACLICK/or ANY BX GENERIC EQUIV   Dose:  0.3 mg   Quantity:  0.6 mL        Inject 0.3 mLs (0.3 mg) into the muscle as needed for anaphylaxis   Refills:  1        lidocaine 5 % Patch   Commonly known as:  LIDODERM   Quantity:  15 patch        Apply up to 1 patch to painful area for up to 12 h within a 24 h period.  Remove after 12 hours.   Refills:  1        loratadine 10 MG tablet   Commonly known as:  CLARITIN   Dose:  10 mg   Quantity:  30 tablet        Take 1 tablet (10 mg) by mouth daily   Refills:  1        order for DME   Quantity:  1 Units        Equipment being ordered: Shower chair with back, 400lb weight capacity   Refills:  0        ranitidine 150 MG tablet   Commonly known as:  ZANTAC   Dose:  150 mg   Quantity:  60 tablet        Take 1 tablet (150 mg) by mouth 2 times daily   Refills:  1        sodium chloride-sodium bicarb 2300-700 MG Kit   Commonly known as:  CLASSIC NETI POT SINUS WASH   Dose:  1 packet   Quantity:  1 kit        Spray 1 packet in nostril daily   Refills:  0        tretinoin 0.025 % cream   Commonly known as:  RETIN-A   Quantity:  45 g        Spread a pea size amount into affected area topically at bedtime.  Use sunscreen SPF>20.   Refills:  11                Procedures and tests performed during your visit     Procedure/Test Number of Times Performed    Basic metabolic panel 1    CBC with platelets differential 1    Chest XR,  PA & LAT 1    EKG 12 lead 2    HCG qualitative 1    Troponin I 1    Troponin I (now) 1      Orders Needing Specimen Collection     None      Pending Results     Date and Time Order Name Status Description    9/4/2018 2339 EKG 12 lead Preliminary     9/4/2018 8145  Chest XR,  PA & LAT Preliminary     9/4/2018 2216 EKG 12 lead Preliminary             Pending Culture Results     No orders found for last 3 day(s).            Pending Results Instructions     If you had any lab results that were not finalized at the time of your Discharge, you can call the ED Lab Result RN at [phone]. You will be contacted by this team for any positive Lab results or changes in treatment. The nurses are available 7 days a week from 10A to 6:30P.  You can leave a message 24 hours per day and they will return your call.        Test Results From Your Hospital Stay        9/4/2018 10:30 PM      Component Results     Component Value Ref Range & Units Status    WBC 5.7 4.0 - 11.0 10e9/L Final    RBC Count 4.36 3.8 - 5.2 10e12/L Final    Hemoglobin 11.0 (L) 11.7 - 15.7 g/dL Final    Hematocrit 34.4 (L) 35.0 - 47.0 % Final    MCV 79 78 - 100 fl Final    MCH 25.2 (L) 26.5 - 33.0 pg Final    MCHC 32.0 31.5 - 36.5 g/dL Final    RDW 15.4 (H) 10.0 - 15.0 % Final    Platelet Count 283 150 - 450 10e9/L Final    Diff Method Automated Method  Final    % Neutrophils 44.7 % Final    % Lymphocytes 44.2 % Final    % Monocytes 10.0 % Final    % Eosinophils 0.5 % Final    % Basophils 0.4 % Final    % Immature Granulocytes 0.2 % Final    Nucleated RBCs 0 0 /100 Final    Absolute Neutrophil 2.6 1.6 - 8.3 10e9/L Final    Absolute Lymphocytes 2.5 0.8 - 5.3 10e9/L Final    Absolute Monocytes 0.6 0.0 - 1.3 10e9/L Final    Absolute Eosinophils 0.0 0.0 - 0.7 10e9/L Final    Absolute Basophils 0.0 0.0 - 0.2 10e9/L Final    Abs Immature Granulocytes 0.0 0 - 0.4 10e9/L Final    Absolute Nucleated RBC 0.0  Final         9/4/2018 10:52 PM      Component Results     Component Value Ref Range & Units Status    Sodium 140 133 - 144 mmol/L Final    Potassium 3.8 3.4 - 5.3 mmol/L Final    Chloride 107 94 - 109 mmol/L Final    Carbon Dioxide 26 20 - 32 mmol/L Final    Anion Gap 7 3 - 14 mmol/L Final    Glucose 94 70 - 99 mg/dL Final     Urea Nitrogen 13 7 - 30 mg/dL Final    Creatinine 1.05 (H) 0.52 - 1.04 mg/dL Final    GFR Estimate 56 (L) >60 mL/min/1.7m2 Final    Non  GFR Calc    GFR Estimate If Black 68 >60 mL/min/1.7m2 Final    African American GFR Calc    Calcium 7.9 (L) 8.5 - 10.1 mg/dL Final         9/4/2018 10:52 PM      Component Results     Component Value Ref Range & Units Status    Troponin I ES <0.015 0.000 - 0.045 ug/L Final    The 99th percentile for upper reference range is 0.045 ug/L.  Troponin values   in the range of 0.045 - 0.120 ug/L may be associated with risks of adverse   clinical events.           9/4/2018 11:34 PM      Narrative     CHEST 2 VIEWS  9/4/2018 11:02 PM     HISTORY: Chest pain.    COMPARISON: 7/29/2018.    FINDINGS: The lungs are clear. Normal-sized cardiac silhouette.        Impression     IMPRESSION: No evidence of active cardiopulmonary disease.         9/4/2018 10:53 PM      Component Results     Component Value Ref Range & Units Status    HCG Qualitative Serum Negative NEG^Negative Final    This test is for screening purposes.  Results should be interpreted along with   the clinical picture.  Confirmation testing is available if warranted by   ordering [id number], HCG Quantitative Pregnancy.           9/5/2018 12:38 AM      Component Results     Component Value Ref Range & Units Status    Troponin I ES <0.015 0.000 - 0.045 ug/L Final    The 99th percentile for upper reference range is 0.045 ug/L.  Troponin values   in the range of 0.045 - 0.120 ug/L may be associated with risks of adverse   clinical events.                  Clinical Quality Measure: Blood Pressure Screening     Your blood pressure was checked while you were in the emergency department today. The last reading we obtained was  BP: 122/67 . Please read the guidelines below about what these numbers mean and what you should do about them.  If your systolic blood pressure (the top number) is less than 120 and your diastolic blood  pressure (the bottom number) is less than 80, then your blood pressure is normal. There is nothing more that you need to do about it.  If your systolic blood pressure (the top number) is 120-139 or your diastolic blood pressure (the bottom number) is 80-89, your blood pressure may be higher than it should be. You should have your blood pressure rechecked within a year by a primary care provider.  If your systolic blood pressure (the top number) is 140 or greater or your diastolic blood pressure (the bottom number) is 90 or greater, you may have high blood pressure. High blood pressure is treatable, but if left untreated over time it can put you at risk for heart attack, stroke, or kidney failure. You should have your blood pressure rechecked by a primary care provider within the next 4 weeks.  If your provider in the emergency department today gave you specific instructions to follow-up with your doctor or provider even sooner than that, you should follow that instruction and not wait for up to 4 weeks for your follow-up visit.        Thank you for choosing [facility]       Thank you for choosing [facility] for your care. Our goal is always to provide you with excellent care. Hearing back from our patients is one way we can continue to improve our services. Please take a few minutes to complete the written survey that you may receive in the mail after you visit with us. Thank you!        [employer] Information     [employer] gives you secure access to your electronic health record. If you see a primary care provider, you can also send messages to your care team and make appointments. If you have questions, please call your primary care clinic.  If you do not have a primary care provider, please call [phone] and they will assist you.        Care EveryWhere ID     This is your Care EveryWhere ID. This could be used by other organizations to access your [facility] medical records  [id number]        Equal Access to Services      CAROLA MALIK : Hadii graham Arrieta, waaxda luqadaha, qaybta kaalmada zuleima, kev trevizo. So [facility] [phone].    ATENCIÓN: Si habla español, tiene a novoa disposición servicios gratuitos de asistencia lingüística. Llame al [phone].    We comply with applicable federal civil rights laws and [place] laws. We do not discriminate on the basis of race, color, national origin, age, disability, sex, sexual orientation, or gender identity.            After Visit Summary       This is your record. Keep this with you and show to your community pharmacist(s) and doctor(s) at your next visit.

## 2018-09-04 NOTE — ED AVS SNAPSHOT
[facility] Emergency Department    [address]    [facility] [address]    Phone:  [phone]    Fax:  [phone]                                       Bernarda Garrett   MRN: [id number]    Department:  [facility] Emergency Department   Date of Visit:  9/4/2018           After Visit Summary Signature Page     I have received my discharge instructions, and my questions have been answered. I have discussed any challenges I see with this plan with the nurse or doctor.    ..........................................................................................................................................  Patient/Patient Representative Signature      ..........................................................................................................................................  Patient Representative Print Name and Relationship to Patient    ..................................................               ................................................  Date                                            Time    ..........................................................................................................................................  Reviewed by Signature/Title    ...................................................              ..............................................  Date                                                            Time          22EPIC Rev 08/18

## 2018-09-05 VITALS
DIASTOLIC BLOOD PRESSURE: 63 MMHG | TEMPERATURE: 98.5 F | SYSTOLIC BLOOD PRESSURE: 112 MMHG | HEART RATE: 81 BPM | RESPIRATION RATE: 20 BRPM | OXYGEN SATURATION: 97 %

## 2018-09-05 PROCEDURE — 84484 ASSAY OF TROPONIN QUANT: CPT | Performed by: EMERGENCY MEDICINE

## 2018-09-05 ASSESSMENT — ENCOUNTER SYMPTOMS
VOMITING: 0
SHORTNESS OF BREATH: 0
ABDOMINAL PAIN: 0
NAUSEA: 1
MYALGIAS: 1

## 2018-09-05 NOTE — ED TRIAGE NOTES
Pt with sudden onset of chest pain while watching tv about 2100 tonight.  Pain increased witih inspiration.  Ems have x1 nitroglycerin pt refused more due to the headache it gave her, asa, and zofran.  Pt had no change in pain from nitro

## 2018-09-05 NOTE — ED PROVIDER NOTES
History     Chief Complaint:  Chest Pain    HPI   Bernarda Garrett is a 48 year old female with a history of mitral valve disorder who presents to the Emergency Department today for evaluation of chest pain. Patient reports that a centrally located, stabbing chest pain began at 8:45 PM today. The pain is worse when she inhales deeply and when she moves around. She also reports tingling in both of her arms. She also reports left leg and arm pain for the last two days. She is nauseous. No shortness of breath, abdominal pain, or vomiting. No other medical problems. She is not on hormones. She is not a smoker.    CARDIAC RISK FACTORS:  Sex:    F  Tobacco:   N  Hypertension:   Y  Hyperlipidemia:  N  Diabetes:   N  Family History:  N    PE/DVT RISK FACTORS:  Sex:    F  Hormones:   N  Tobacco:   N  Cancer:   N  Travel:   N  Surgery:   N  Other immobilization: N  Personal history:  N  Family history:  N      Allergies:  Dilaudid: itching  Minocycline: itching  Latex: rash     Medications:    Vitamin D3  Claritin  Zantac    Past Medical History:    Hypertension  Heart murmur  Mitral valve disorder  Bipolar affective disorder  Vaginal discharge    Past Surgical History:    History reviewed. No pertinent surgical history.    Family History:    History reviewed. No pertinent family history.     Social History:  Smoking status: Never smoker  Alcohol use: No  Marital Status:   [4]     Review of Systems   Respiratory: Negative for shortness of breath.    Cardiovascular: Positive for chest pain.   Gastrointestinal: Positive for nausea. Negative for abdominal pain and vomiting.   Musculoskeletal: Positive for myalgias.   All other systems reviewed and are negative.        Physical Exam     Patient Vitals for the past 24 hrs:   BP Temp Pulse Heart Rate Resp SpO2   09/05/18 0050 - - - - - 97 %   09/05/18 0045 - - - - - 98 %   09/05/18 0040 112/63 - - - - 98 %   09/05/18 0030 - - - - - 97 %   09/05/18 0020 104/75 - - - - 98 %    09/05/18 0018 - - - - - 98 %   09/05/18 0015 - - - - - 98 %   09/05/18 0000 122/67 - - - - 99 %   09/04/18 2345 - - - - - 99 %   09/04/18 2340 117/70 - - - - -   09/04/18 2333 116/71 - - - - 100 %   09/04/18 2245 - - - - - 99 %   09/04/18 2240 133/78 - - - - 97 %   09/04/18 2230 - - - - - 97 %   09/04/18 2220 130/83 - - - - 98 %   09/04/18 2216 (!) 136/94 98.5  F (36.9  C) 81 81 20 98 %   09/04/18 2215 - - - - - 93 %       Physical Exam  Constitutional:  Oriented to person, place, and time. Anxious-appearing.  HENT:   Head:    Normocephalic.   Mouth/Throat:   Oropharynx is clear and moist.   Eyes:    EOM are normal. Pupils are equal, round, and reactive to light.   Neck:    Neck supple.   Cardiovascular:  Normal rate, regular rhythm and normal heart sounds.      Exam reveals no gallop and no friction rub.       No murmur heard.  Pulmonary/Chest:  Reproducible left chest wall tenderness. Effort normal and breath sounds    normal.      No respiratory distress. No wheezes. No rales.      No reproducible chest wall pain.  Abdominal:   Soft. No distension. No tenderness. No rebound and no guarding.   Musculoskeletal:  Normal range of motion. 2+ distal equal pulses.     No leg calf tenderness, swelling or edema.  Neurological:   Alert and oriented to person, place, and time.           Moves all 4 extremities spontaneously    Skin:    No rash noted. No pallor.     Emergency Department Course     ECG #1 (22:19:36):  Rate 74 bpm. MA interval 144. QRS duration 84. QT/QTc 376/417. P-R-T axes 39 -4 56. Normal sinus rhythm with sinus arrhythmia. Nonspecific T wave abnormality. Abnormal ECG. No significant change when compared to EKG dated 07/29/18. Interpreted at 2220 by Anuj Reyes MD.    ECG #2 (00:07:18):  Rate 76 bpm. MA interval 144. QRS duration 82. QT/QTc 408/459. P-R-T axes 36 -9 62. Normal sinus rhythm. Nonspecific T wave abnormality. Abnormal ECG. Interpreted at 0009 by Anuj Reyes,  MD.    Imaging:  Radiographic findings were communicated with the patient who voiced understanding of the findings.  Chest XR, PA & LAT  IMPRESSION: No evidence of active cardiopulmonary disease. As read by radiology.    Laboratory:  CBC: HGB 11.0 (L) o/w WNL (WBC 5.7 )  BMP: Creatinine 1.05 (H), GFR 56 (L), Calcium 7.9 (L) o/w WNL    HCG: Negative    Troponin I: <0.015    Interventions:  2252: Toradol 10 MG IV    Emergency Department Course:  Past medical records, nursing notes, and vitals reviewed.  2233: I performed an exam of the patient and obtained history, as documented above.    IV inserted and blood drawn.    The patient was sent for a chest X-ray while in the emergency department, findings above.    0056: I rechecked the patient. Explained findings to the patient.    Patient discharged home with instructions regarding supportive care, medications, and reasons to return. The importance of close follow-up was reviewed.     Impression & Plan      Medical Decision Making:  Bernarda Garrett is a 48 year old female who presents with chest pain.  The work up in the Emergency Department is negative.  The differential diagnosis of chest pain is broad and includes life threatening etiologies such as acute coronary syndrome, myocardial infarction, pulmonary embolism, acute aortic dissection, amongst others.  The patient's EKG was nonischemic and troponin was normal.  The chest pain symptom complex would be atypical for coronary ischemia.  The patient is low risk for PE and so I feel the risks of CT imaging outweighs any benefits.  Chest xray reveals no evidence of pneumonia or pneumothorax. She has a low heart score. No serious etiology for the chest pain were detected today during this visit. Close follow up with primary care is indicated should the pain continue, as further work up may be performed; this was made clear to the patient, who understands.     Diagnosis:    ICD-10-CM   1. Atypical chest pain R07.89        Disposition:  discharged to home    Gabriella Toledo  9/4/2018   [facility] EMERGENCY DEPARTMENT  Scribe Disclosure:  I, Gabriella Toledo, am serving as a scribe at 10:33 PM on 9/4/2018 to document services personally performed by Anuj Reyes MD based on my observations and the provider's statements to me.        Anuj Reyes MD  09/05/18 0236

## 2018-09-20 ENCOUNTER — OFFICE VISIT (OUTPATIENT)
Dept: FAMILY MEDICINE | Facility: CLINIC | Age: 48
End: 2018-09-20
Payer: MEDICARE

## 2018-09-20 VITALS
DIASTOLIC BLOOD PRESSURE: 72 MMHG | OXYGEN SATURATION: 99 % | WEIGHT: 178 LBS | TEMPERATURE: 98 F | BODY MASS INDEX: 29.62 KG/M2 | SYSTOLIC BLOOD PRESSURE: 118 MMHG | HEART RATE: 73 BPM

## 2018-09-20 DIAGNOSIS — J35.8 TONSILLOLITH: Primary | ICD-10-CM

## 2018-09-20 DIAGNOSIS — J30.2 SEASONAL ALLERGIC RHINITIS, UNSPECIFIED CHRONICITY, UNSPECIFIED TRIGGER: ICD-10-CM

## 2018-09-20 PROCEDURE — 99213 OFFICE O/P EST LOW 20 MIN: CPT | Performed by: FAMILY MEDICINE

## 2018-09-20 RX ORDER — ACETAMINOPHEN 500 MG
1000 TABLET ORAL EVERY 8 HOURS PRN
Qty: 30 TABLET | Refills: 0 | Status: SHIPPED | OUTPATIENT
Start: 2018-09-20 | End: 2018-09-20

## 2018-09-20 RX ORDER — ACETAMINOPHEN 500 MG
1000 TABLET ORAL EVERY 8 HOURS PRN
Qty: 30 TABLET | Refills: 0 | Status: SHIPPED | OUTPATIENT
Start: 2018-09-20 | End: 2019-07-10

## 2018-09-20 RX ORDER — LORATADINE 10 MG/1
10 TABLET ORAL DAILY
Qty: 30 TABLET | Refills: 1 | Status: SHIPPED | OUTPATIENT
Start: 2018-09-20 | End: 2018-09-20

## 2018-09-20 RX ORDER — LORATADINE 10 MG/1
10 TABLET ORAL DAILY
Qty: 30 TABLET | Refills: 1 | Status: SHIPPED | OUTPATIENT
Start: 2018-09-20 | End: 2019-07-10

## 2018-09-20 ASSESSMENT — ANXIETY QUESTIONNAIRES
IF YOU CHECKED OFF ANY PROBLEMS ON THIS QUESTIONNAIRE, HOW DIFFICULT HAVE THESE PROBLEMS MADE IT FOR YOU TO DO YOUR WORK, TAKE CARE OF THINGS AT HOME, OR GET ALONG WITH OTHER PEOPLE: VERY DIFFICULT
6. BECOMING EASILY ANNOYED OR IRRITABLE: NEARLY EVERY DAY
GAD7 TOTAL SCORE: 21
7. FEELING AFRAID AS IF SOMETHING AWFUL MIGHT HAPPEN: NEARLY EVERY DAY
2. NOT BEING ABLE TO STOP OR CONTROL WORRYING: NEARLY EVERY DAY
1. FEELING NERVOUS, ANXIOUS, OR ON EDGE: NEARLY EVERY DAY
5. BEING SO RESTLESS THAT IT IS HARD TO SIT STILL: NEARLY EVERY DAY
3. WORRYING TOO MUCH ABOUT DIFFERENT THINGS: NEARLY EVERY DAY

## 2018-09-20 ASSESSMENT — PATIENT HEALTH QUESTIONNAIRE - PHQ9: 5. POOR APPETITE OR OVEREATING: NEARLY EVERY DAY

## 2018-09-20 NOTE — PROGRESS NOTES
SUBJECTIVE:   Bernarda Garertt is a 48 year old female who presents to clinic today for the following health issues:    Patient reports pain on left side of throat, pain started after clearing a tonsil stone on that side with her finger nail today. Patient reports no fever chill or sweat. No signs of illness.  Rare tonsillolith.    Due for cpx fasting.    Problem list and histories reviewed & adjusted, as indicated.  Additional history: as documented    BP Readings from Last 3 Encounters:   09/20/18 118/72   09/05/18 112/63   07/29/18 (!) 137/91       body mass index is 29.62 kg/(m^2).    Wt Readings from Last 4 Encounters:   09/20/18 178 lb (80.7 kg)   07/29/18 178 lb (80.7 kg)   06/05/18 180 lb (81.6 kg)   03/19/18 178 lb (80.7 kg)       Health Maintenance    Health Maintenance Due   Topic Date Due     WELLNESS VISIT Q1 YR  1970     DEPRESSION ACTION PLAN Q1 YR  01/03/1989     HIV SCREEN (SYSTEM ASSIGNED)  01/03/1989     LIPID SCREEN Q5 YR FEMALE (SYSTEM ASSIGNED)  01/03/2016     PHQ-9 Q6 MONTHS  08/09/2018     INFLUENZA VACCINE (1) 09/01/2018       Current Problem List    Patient Active Problem List   Diagnosis     Bipolar affective disorder (H)     Heart murmur     Acne     Mitral valve disorder     Vaginal discharge       Past Medical History    Past Medical History:   Diagnosis Date     Hypertension        Past Surgical History    No past surgical history on file.    Current Medications    Current Outpatient Prescriptions   Medication Sig Dispense Refill     acetaminophen (TYLENOL) 500 MG tablet Take 2 tablets (1,000 mg) by mouth every 8 hours as needed for mild pain 30 tablet 0     loratadine (CLARITIN) 10 MG tablet Take 1 tablet (10 mg) by mouth daily 30 tablet 1     cholecalciferol (VITAMIN D3) 1000 UNIT tablet Take 1 tablet (1,000 Units) by mouth daily 100 tablet 3     EPINEPHrine (EPIPEN/ADRENACLICK/OR ANY BX GENERIC EQUIV) 0.3 MG/0.3ML injection 2-pack Inject 0.3 mLs (0.3 mg) into the muscle as needed  for anaphylaxis 0.6 mL 1     lidocaine (LIDODERM) 5 % Patch Apply up to 1 patch to painful area for up to 12 h within a 24 h period.  Remove after 12 hours. 15 patch 1     order for DME Equipment being ordered: Shower chair with back, 400lb weight capacity 1 Units 0     ranitidine (ZANTAC) 150 MG tablet Take 1 tablet (150 mg) by mouth 2 times daily 60 tablet 1     sodium chloride-sodium bicarb (CLASSIC NETI POT SINUS WASH) 2300-700 MG KIT Spray 1 packet in nostril daily 1 kit 0     tretinoin (RETIN-A) 0.025 % cream Spread a pea size amount into affected area topically at bedtime.  Use sunscreen SPF>20. 45 g 11       Allergies    Allergies   Allergen Reactions     Peanuts [Nuts] Anaphylaxis     Dilaudid [Hydromorphone] Itching     Minocycline Itching     Latex Rash       Immunizations    Immunization History   Administered Date(s) Administered     HepB-Adult 02/21/2013     Influenza Vaccine IM Ages 6-35 Months 4 Valent (PF) 01/09/2013     TDAP Vaccine (Adacel) 10/01/2008     Tdap (Adacel,Boostrix) 08/04/2010       Family History    No family history on file.    Social History    Social History     Social History     Marital status:      Spouse name: N/A     Number of children: N/A     Years of education: N/A     Occupational History     Not on file.     Social History Main Topics     Smoking status: Never Smoker     Smokeless tobacco: Never Used     Alcohol use No     Drug use: No     Sexual activity: Not Currently     Partners: Male     Other Topics Concern     Parent/Sibling W/ Cabg, Mi Or Angioplasty Before 65f 55m? No     Social History Narrative       All above reviewed and updated, all stable unless otherwise noted    Recent labs reviewed    ROS:  Constitutional, HEENT, cardiovascular, pulmonary, GI, , musculoskeletal, neuro, skin, endocrine and psych systems are negative, except as otherwise noted.    OBJECTIVE:                                                    /72  Pulse 73  Temp 98  F  (36.7  C) (Oral)  Wt 178 lb (80.7 kg)  SpO2 99%  BMI 29.62 kg/m2  Body mass index is 29.62 kg/(m^2).  GENERAL: healthy, alert and no distress  EYES: Eyes grossly normal to inspection  HENT:ear canals and TM's normal upon viewing with otoscope, nose and mouth without ulcers or lesions upon viewing with otoscope  NECK: no tenderness, no adenopathy, no asymmetry, no masses, no stiffness; thyroid- normal to palpation  RESP: lungs clear to auscultation - no rales, no rhonchi, no wheezes  CV: regular rates and rhythm, normal S1 S2, no S3 or S4 and no murmur, no click or rub -  ABDOMEN: soft, no tenderness, no  hepatosplenomegaly, no masses, normal bowel sounds  MS: extremities- no gross deformities noted, no edema  SKIN: no suspicious lesions, no rashes  NEURO: strength and tone- normal, sensory exam- grossly normal, mentation- intact, speech- normal  BACK: no CVA tenderness, no paralumbar tenderness  PSYCH: Alert and oriented times 3; speech- coherent , normal rate and volume; able to articulate logical thoughts, able to abstract reason, no tangential thoughts, no hallucinations or delusions, affect- normal    DIAGNOSTICS/PROCEDURES:                                                      No results found for this or any previous visit (from the past 24 hour(s)).     ASSESSMENT/PLAN:                                                        ICD-10-CM    1. Tonsillolith J35.8 acetaminophen (TYLENOL) 500 MG tablet     DISCONTINUED: acetaminophen (TYLENOL) 500 MG tablet   2. Seasonal allergic rhinitis, unspecified chronicity, unspecified trigger J30.2 loratadine (CLARITIN) 10 MG tablet     DISCONTINUED: loratadine (CLARITIN) 10 MG tablet       Discussed treatment/modality options, including risk and benefits, she desires cold liquids/gargles, refill tylenol, claritin, consider ENT if recurrent. All diagnosis above reviewed and noted above, otherwise stable.  See [facility] orders for further details.  Follow up as needed.    1)  Recommend Cold liquids to soothe throat. Recommend Tylenol extra strength use as needed for pain management. Prescription refilled today.    2) Follow up in 2 months for annual physical.    Health Maintenance Due   Topic Date Due     WELLNESS VISIT Q1 YR  1970     DEPRESSION ACTION PLAN Q1 YR  01/03/1989     HIV SCREEN (SYSTEM ASSIGNED)  01/03/1989     LIPID SCREEN Q5 YR FEMALE (SYSTEM ASSIGNED)  01/03/2016     PHQ-9 Q6 MONTHS  08/09/2018     INFLUENZA VACCINE (1) 09/01/2018     This document serves as a record of the services and decisions personally performed and made by Paulino Banks MD. It was created on his behalf by Frank Ramos, a trained medical scribe. The creation of this document is based on the provider's statements to the medical scribe.  Frank Ramos September 20, 2018 11:28 AM     The information in this document, created by the medical scribe for me, accurately reflects the services I personally performed and the decisions made by me. I have reviewed and approved this document for accuracy prior to leaving the patient care area.  September 20, 2018            Paulino Banks MD [address]  [id number] [phone] Fax

## 2018-09-20 NOTE — PATIENT INSTRUCTIONS
Tylenol extra strength.    [facility] - [place]                        To reach your care team during and after hours:   [phone]  To reach our pharmacy:        [phone]    Clinic Hours                        Our clinic hours are:    Monday   7:30 am to 7:00 pm                  Tuesday through Friday 7:30 am to 5:00 pm                             Saturday   8:00 am to 12:00 pm      Sunday   Closed      Pharmacy Hours                        Our pharmacy hours are:    Monday   8:30 am to 7:00 pm       Tuesday to Friday  8:30 am to 6:00 pm                       Saturday    9:00 am to 1:00 pm              Sunday    Closed              There is also information available at our web site:  [email or web address]    If your provider ordered any lab tests and you do not receive the results within 10 business days, please call the clinic.    If you need a medication refill please contact your pharmacy.  Please allow 2-3 business days for your refill to be completed.    Our clinic offers telephone visits and e visits.  Please ask one of your team members to explain more.      Use [employer] (secure email communication and access to your chart) to send your primary care provider a message or make an appointment. Ask someone on your Team how to sign up for [employer].  Immunizations                      Immunization History   Administered Date(s) Administered     HepB-Adult 02/21/2013     Influenza Vaccine IM Ages 6-35 Months 4 Valent (PF) 01/09/2013     TDAP Vaccine (Adacel) 10/01/2008     Tdap (Adacel,Boostrix) 08/04/2010        Health Maintenance                         Health Maintenance Due   Topic Date Due     Wellness Visit with your Primary Provider - yearly  1970     Depression Action Plan Review - yearly  01/03/1989     HIV SCREEN (SYSTEM ASSIGNED)  01/03/1989     Cholesterol Lab - every 5 years  01/03/2016     Depression Assessment - every 6 months  08/09/2018     Flu Vaccine (1) 09/01/2018

## 2018-09-20 NOTE — MR AVS SNAPSHOT
After Visit Summary   9/20/2018    Bernarda Garrett    MRN: [id number]           Patient Information     Date Of Birth          1970        Visit Information        Provider Department      9/20/2018 11:00 AM Paulino Banks MD [facility]        Today's Diagnoses     Tonsillolith    -  1    Seasonal allergic rhinitis, unspecified chronicity, unspecified trigger          Care Instructions    Tylenol extra strength.    [facility] [place]                        To reach your care team during and after hours:   [phone]  To reach our pharmacy:        [phone]    Clinic Hours                        Our clinic hours are:    Monday   7:30 am to 7:00 pm                  Tuesday through Friday 7:30 am to 5:00 pm                             Saturday   8:00 am to 12:00 pm      Sunday   Closed      Pharmacy Hours                        Our pharmacy hours are:    Monday   8:30 am to 7:00 pm       Tuesday to Friday  8:30 am to 6:00 pm                       Saturday    9:00 am to 1:00 pm              Sunday    Closed              There is also information available at our web site:  [email or web address]    If your provider ordered any lab tests and you do not receive the results within 10 business days, please call the clinic.    If you need a medication refill please contact your pharmacy.  Please allow 2-3 business days for your refill to be completed.    Our clinic offers telephone visits and e visits.  Please ask one of your team members to explain more.      Use [employer] (secure email communication and access to your chart) to send your primary care provider a message or make an appointment. Ask someone on your Team how to sign up for [employer].  Immunizations                      Immunization History   Administered Date(s) Administered     HepB-Adult 02/21/2013     Influenza Vaccine IM Ages 6-35 Months 4 Valent (PF) 01/09/2013     TDAP Vaccine (Adacel) 10/01/2008     Tdap  (Adacel,Boostrix) 08/04/2010        Health Maintenance                         Health Maintenance Due   Topic Date Due     Wellness Visit with your Primary Provider - yearly  1970     Depression Action Plan Review - yearly  01/03/1989     HIV SCREEN (SYSTEM ASSIGNED)  01/03/1989     Cholesterol Lab - every 5 years  01/03/2016     Depression Assessment - every 6 months  08/09/2018     Flu Vaccine (1) 09/01/2018               Follow-ups after your visit        Follow-up notes from your care team     Return in about 2 months (around 11/20/2018), or if symptoms worsen or fail to improve, for Physical Exam.      Who to contact     If you have questions or need follow up information about today's clinic visit or your schedule please contact [facility] directly at [phone].  Normal or non-critical lab and imaging results will be communicated to you by MyChart, letter or phone within 4 business days after the clinic has received the results. If you do not hear from us within 7 days, please contact the clinic through MyChart or phone. If you have a critical or abnormal lab result, we will notify you by phone as soon as possible.  Submit refill requests through [facility] or call your pharmacy and they will forward the refill request to us. Please allow 3 business days for your refill to be completed.          Additional Information About Your Visit        [employer] Information     [facility] gives you secure access to your electronic health record. If you see a primary care provider, you can also send messages to your care team and make appointments. If you have questions, please call your primary care clinic.  If you do not have a primary care provider, please call [phone] and they will assist you.        Care EveryWhere ID     This is your Care EveryWhere ID. This could be used by other organizations to access your [facility] medical records  [id number]        Your Vitals Were     Pulse Temperature  Pulse Oximetry BMI (Body Mass Index)          73 98  F (36.7  C) (Oral) 99% 29.62 kg/m2         Blood Pressure from Last 3 Encounters:   09/20/18 118/72   09/05/18 112/63   07/29/18 (!) 137/91    Weight from Last 3 Encounters:   09/20/18 178 lb (80.7 kg)   07/29/18 178 lb (80.7 kg)   06/05/18 180 lb (81.6 kg)              Today, you had the following     No orders found for display         Today's Medication Changes          These changes are accurate as of 9/20/18 11:36 AM.  If you have any questions, ask your nurse or doctor.               Start taking these medicines.        Dose/Directions    acetaminophen 500 MG tablet   Commonly known as:  TYLENOL   Used for:  Tonsillolith   Started by:  Paulino Banks MD        Dose:  1000 mg   Take 2 tablets (1,000 mg) by mouth every 8 hours as needed for mild pain   Quantity:  30 tablet   Refills:  0       loratadine 10 MG tablet   Commonly known as:  CLARITIN   Used for:  Seasonal allergic rhinitis, unspecified chronicity, unspecified trigger   Started by:  Paulino Banks MD        Dose:  10 mg   Take 1 tablet (10 mg) by mouth daily   Quantity:  30 tablet   Refills:  1            Where to get your medicines      These medications were sent to [facility] [place] [address]     Phone:  [phone]     acetaminophen 500 MG tablet    loratadine 10 MG tablet                Primary Care Provider Office Phone # Fax #    Tiffanie Shannon PA-C [phone]       [address] [place] [address]        Equal Access to Services     [facility] AH: Hadii graham pandey hadasho Sopeter, waaxda luqadaha, qaybta kaalmada adeegyanish, kev trevizo. So [facility] [phone].    ATENCIÓN: Si habla español, tiene a novoa disposición servicios gratuitos de asistencia lingüística. Thuy al [phone].    We comply with applicable federal civil rights laws and  [place] laws. We do not discriminate on the basis of race, color, national origin, age, disability, sex, sexual orientation, or gender identity.            Thank you!     Thank you for choosing [facility]  for your care. Our goal is always to provide you with excellent care. Hearing back from our patients is one way we can continue to improve our services. Please take a few minutes to complete the written survey that you may receive in the mail after your visit with us. Thank you!             Your Updated Medication List - Protect others around you: Learn how to safely use, store and throw away your medicines at www.disposemymeds.org.          This list is accurate as of 9/20/18 11:36 AM.  Always use your most recent med list.                   Brand Name Dispense Instructions for use Diagnosis    acetaminophen 500 MG tablet    TYLENOL    30 tablet    Take 2 tablets (1,000 mg) by mouth every 8 hours as needed for mild pain    Tonsillolith       cholecalciferol 1000 UNIT tablet    vitamin D3    100 tablet    Take 1 tablet (1,000 Units) by mouth daily    Bipolar affective disorder, remission status unspecified (H)       EPINEPHrine 0.3 MG/0.3ML injection 2-pack    EPIPEN/ADRENACLICK/or ANY BX GENERIC EQUIV    0.6 mL    Inject 0.3 mLs (0.3 mg) into the muscle as needed for anaphylaxis    Peanut allergy       lidocaine 5 % Patch    LIDODERM    15 patch    Apply up to 1 patch to painful area for up to 12 h within a 24 h period.  Remove after 12 hours.    Costochondritis       loratadine 10 MG tablet    CLARITIN    30 tablet    Take 1 tablet (10 mg) by mouth daily    Seasonal allergic rhinitis, unspecified chronicity, unspecified trigger       order for DME     1 Units    Equipment being ordered: Shower chair with back, 400lb weight capacity    Balance problems       ranitidine 150 MG tablet    ZANTAC    60 tablet    Take 1 tablet (150 mg) by mouth 2 times daily        sodium chloride-sodium bicarb  2300-700 MG Kit    CLASSIC NETI POT SINUS WASH    1 kit    Spray 1 packet in nostril daily        tretinoin 0.025 % cream    RETIN-A    45 g    Spread a pea size amount into affected area topically at bedtime.  Use sunscreen SPF>20.    Acne vulgaris

## 2018-09-21 ASSESSMENT — PATIENT HEALTH QUESTIONNAIRE - PHQ9: SUM OF ALL RESPONSES TO PHQ QUESTIONS 1-9: 21

## 2018-09-21 ASSESSMENT — ANXIETY QUESTIONNAIRES: GAD7 TOTAL SCORE: 21

## 2018-11-28 ENCOUNTER — OFFICE VISIT (OUTPATIENT)
Dept: FAMILY MEDICINE | Facility: CLINIC | Age: 48
End: 2018-11-28
Payer: MEDICARE

## 2018-11-28 ENCOUNTER — TELEPHONE (OUTPATIENT)
Dept: FAMILY MEDICINE | Facility: CLINIC | Age: 48
End: 2018-11-28

## 2018-11-28 VITALS
OXYGEN SATURATION: 100 % | HEIGHT: 65 IN | HEART RATE: 66 BPM | BODY MASS INDEX: 30.49 KG/M2 | TEMPERATURE: 98.2 F | SYSTOLIC BLOOD PRESSURE: 114 MMHG | WEIGHT: 183 LBS | DIASTOLIC BLOOD PRESSURE: 72 MMHG

## 2018-11-28 DIAGNOSIS — N89.8 VAGINAL ODOR: ICD-10-CM

## 2018-11-28 DIAGNOSIS — Z91.010 PEANUT ALLERGY: ICD-10-CM

## 2018-11-28 DIAGNOSIS — K21.9 GASTROESOPHAGEAL REFLUX DISEASE, ESOPHAGITIS PRESENCE NOT SPECIFIED: ICD-10-CM

## 2018-11-28 DIAGNOSIS — N89.8 VAGINAL DISCHARGE: ICD-10-CM

## 2018-11-28 DIAGNOSIS — Z13.1 SCREENING FOR DIABETES MELLITUS: ICD-10-CM

## 2018-11-28 DIAGNOSIS — Z30.015 ENCOUNTER FOR INITIAL PRESCRIPTION OF VAGINAL RING HORMONAL CONTRACEPTIVE: ICD-10-CM

## 2018-11-28 DIAGNOSIS — J30.2 SEASONAL ALLERGIC RHINITIS, UNSPECIFIED TRIGGER: ICD-10-CM

## 2018-11-28 DIAGNOSIS — Z11.4 SCREENING FOR HIV (HUMAN IMMUNODEFICIENCY VIRUS): ICD-10-CM

## 2018-11-28 DIAGNOSIS — Z12.31 ENCOUNTER FOR SCREENING MAMMOGRAM FOR BREAST CANCER: ICD-10-CM

## 2018-11-28 DIAGNOSIS — Z00.00 ROUTINE GENERAL MEDICAL EXAMINATION AT A HEALTH CARE FACILITY: Primary | ICD-10-CM

## 2018-11-28 DIAGNOSIS — Z13.220 SCREENING CHOLESTEROL LEVEL: ICD-10-CM

## 2018-11-28 LAB
BETA HCG QUAL IFA URINE: NEGATIVE
SPECIMEN SOURCE: NORMAL
WET PREP SPEC: NORMAL

## 2018-11-28 PROCEDURE — 99213 OFFICE O/P EST LOW 20 MIN: CPT | Mod: 25 | Performed by: PHYSICIAN ASSISTANT

## 2018-11-28 PROCEDURE — 87210 SMEAR WET MOUNT SALINE/INK: CPT | Performed by: PHYSICIAN ASSISTANT

## 2018-11-28 PROCEDURE — 99396 PREV VISIT EST AGE 40-64: CPT | Performed by: PHYSICIAN ASSISTANT

## 2018-11-28 PROCEDURE — 84703 CHORIONIC GONADOTROPIN ASSAY: CPT | Performed by: PHYSICIAN ASSISTANT

## 2018-11-28 RX ORDER — ETONOGESTREL AND ETHINYL ESTRADIOL VAGINAL RING .015; .12 MG/D; MG/D
RING VAGINAL
Qty: 1 EACH | Refills: 1 | Status: SHIPPED | OUTPATIENT
Start: 2018-11-28 | End: 2018-11-28

## 2018-11-28 RX ORDER — EPINEPHRINE 0.3 MG/.3ML
0.3 INJECTION SUBCUTANEOUS PRN
Qty: 0.6 ML | Refills: 1 | Status: SHIPPED | OUTPATIENT
Start: 2018-11-28 | End: 2020-03-20

## 2018-11-28 RX ORDER — ETONOGESTREL AND ETHINYL ESTRADIOL VAGINAL RING .015; .12 MG/D; MG/D
RING VAGINAL
Qty: 3 EACH | Refills: 1 | Status: SHIPPED | OUTPATIENT
Start: 2018-11-28 | End: 2019-05-14

## 2018-11-28 NOTE — LETTER
My Depression Action Plan  Name: Bernarda Garrett   Date of Birth 1970  Date: 11/28/2018    My doctor: Tiffanie Shannon   My clinic: [address] [phone]          GREEN    ZONE   Good Control    What it looks like:     Things are going generally well. You have normal up s and down s. You may even feel depressed from time to time, but bad moods usually last less than a day.   What you need to do:  1. Continue to care for yourself (see self care plan)  2. Check your depression survival kit and update it as needed  3. Follow your physician s recommendations including any medication.  4. Do not stop taking medication unless you consult with your physician first.           YELLOW         ZONE Getting Worse    What it looks like:     Depression is starting to interfere with your life.     It may be hard to get out of bed; you may be starting to isolate yourself from others.    Symptoms of depression are starting to last most all day and this has happened for several days.     You may have suicidal thoughts but they are not constant.   What you need to do:     1. Call your care team, your response to treatment will improve if you keep your care team informed of your progress. Yellow periods are signs an adjustment may need to be made.     2. Continue your self-care, even if you have to fake it!    3. Talk to someone in your support network    4. Open up your depression survival kit           RED    ZONE Medical Alert - Get Help    What it looks like:     Depression is seriously interfering with your life.     You may experience these or other symptoms: You can t get out of bed most days, can t work or engage in other necessary activities, you have trouble taking care of basic hygiene, or basic responsibilities, thoughts of suicide or death that will not go away, self-injurious behavior.     What you need to do:  1. Call your care team  and request a same-day appointment. If they are not available (weekends or after hours) call your local crisis line, emergency room or 911.            Depression Self Care Plan / Survival Kit    Self-Care for Depression  Here s the deal. Your body and mind are really not as separate as most people think.  What you do and think affects how you feel and how you feel influences what you do and think. This means if you do things that people who feel good do, it will help you feel better.  Sometimes this is all it takes.  There is also a place for medication and therapy depending on how severe your depression is, so be sure to consult with your medical provider and/ or Behavioral Health Consultant if your symptoms are worsening or not improving.     In order to better manage my stress, I will:    Exercise  Get some form of exercise, every day. This will help reduce pain and release endorphins, the  feel good  chemicals in your brain. This is almost as good as taking antidepressants!  This is not the same as joining a gym and then never going! (they count on that by the way ) It can be as simple as just going for a walk or doing some gardening, anything that will get you moving.      Hygiene   Maintain good hygiene (Get out of bed in the morning, Make your bed, Brush your teeth, Take a shower, and Get dressed like you were going to work, even if you are unemployed).  If your clothes don't fit try to get ones that do.    Diet  I will strive to eat foods that are good for me, drink plenty of water, and avoid excessive sugar, caffeine, alcohol, and other mood-altering substances.  Some foods that are helpful in depression are: complex carbohydrates, B vitamins, flaxseed, fish or fish oil, fresh fruits and vegetables.    Psychotherapy  I agree to participate in Individual Therapy (if recommended).    Medication  If prescribed medications, I agree to take them.  Missing doses can result in serious side effects.  I understand  that drinking alcohol, or other illicit drug use, may cause potential side effects.  I will not stop my medication abruptly without first discussing it with my provider.    Staying Connected With Others  I will stay in touch with my friends, family members, and my primary care provider/team.    Use your imagination  Be creative.  We all have a creative side; it doesn t matter if it s oil painting, sand castles, or mud pies! This will also kick up the endorphins.    Witness Beauty  (AKA stop and smell the roses) Take a look outside, even in mid-winter. Notice colors, textures. Watch the squirrels and birds.     Service to others  Be of service to others.  There is always someone else in need.  By helping others we can  get out of ourselves  and remember the really important things.  This also provides opportunities for practicing all the other parts of the program.    Humor  Laugh and be silly!  Adjust your TV habits for less news and crime-drama and more comedy.    Control your stress  Try breathing deep, massage therapy, biofeedback, and meditation. Find time to relax each day.     My support system    Clinic Contact:  Phone number:    Contact 1:  Phone number:    Contact 2:  Phone number:    [employer]/:  Phone number:    Therapist:  Phone number:    Local crisis center:    Phone number:    Other community support:  Phone number:

## 2018-11-28 NOTE — MR AVS SNAPSHOT
After Visit Summary   11/28/2018    Bernarda Garrett    MRN: [id number]           Patient Information     Date Of Birth          1970        Visit Information        Provider Department      11/28/2018 1:20 PM Tiffanie Shannon PA-C [facility]        Today's Diagnoses     Routine general medical examination at a health care facility    -  1    Vaginal discharge        Vaginal odor        Peanut allergy        Seasonal allergic rhinitis, unspecified trigger        Gastroesophageal reflux disease, esophagitis presence not specified        Encounter for initial prescription of vaginal ring hormonal contraceptive        Screening cholesterol level        Screening for diabetes mellitus        Screening for HIV (human immunodeficiency virus)        Encounter for screening mammogram for breast cancer          Care Instructions      Preventive Health Recommendations  Female Ages 40 to 49    Yearly exam:     See your health care provider every year in order to  1. Review health changes.   2. Discuss preventive care.    3. Review your medicines if your doctor prescribed any.      Get a Pap test every three years (unless you have an abnormal result and your provider advises testing more often).      If you get Pap tests with HPV test, you only need to test every 5 years, unless you have an abnormal result. You do not need a Pap test if your uterus was removed (hysterectomy) and you have not had cancer.      You should be tested each year for STDs (sexually transmitted diseases), if you're at risk.     Ask your doctor if you should have a mammogram.      Have a colonoscopy (test for colon cancer) if someone in your family has had colon cancer or polyps before age 50.       Have a cholesterol test every 5 years.       Have a diabetes test (fasting glucose) after age 45. If you are at risk for diabetes, you should have this test every 3 years.    Shots: Get a flu shot each year. Get a tetanus shot  every 10 years.     Nutrition:     Eat at least 5 servings of fruits and vegetables each day.    Eat whole-grain bread, whole-wheat pasta and brown rice instead of white grains and rice.    Get adequate Calcium and Vitamin D.      Lifestyle    Exercise at least 150 minutes a week (an average of 30 minutes a day, 5 days a week). This will help you control your weight and prevent disease.    Limit alcohol to one drink per day.    No smoking.     Wear sunscreen to prevent skin cancer.    See your dentist every six months for an exam and cleaning.          Follow-ups after your visit        Follow-up notes from your care team     Return in about 4 weeks (around 12/26/2018) for medication re-check, Re-check of symptoms, please be seen sooner if needed.      Who to contact     If you have questions or need follow up information about today's clinic visit or your schedule please contact [facility] directly at [phone].  Normal or non-critical lab and imaging results will be communicated to you by Ahalogy[employer], letter or phone within 4 business days after the clinic has received the results. If you do not hear from us within 7 days, please contact the clinic through [employer] or phone. If you have a critical or abnormal lab result, we will notify you by phone as soon as possible.  Submit refill requests through vocaltap or call your pharmacy and they will forward the refill request to us. Please allow 3 business days for your refill to be completed.          Additional Information About Your Visit        vocaltap Information     vocaltap gives you secure access to your electronic health record. If you see a primary care provider, you can also send messages to your care team and make appointments. If you have questions, please call your primary care clinic.  If you do not have a primary care provider, please call [phone] and they will assist you.        Care EveryWhere ID     This is your Care EveryWhere ID.  "This could be used by other organizations to access your [place] medical records  [id number]        Your Vitals Were     Pulse Temperature Height Last Period Pulse Oximetry Breastfeeding?    66 98.2  F (36.8  C) (Oral) 5' 5\" (1.651 m) 10/10/2018 (Exact Date) 100% No    BMI (Body Mass Index)                   30.45 kg/m2            Blood Pressure from Last 3 Encounters:   11/28/18 114/72   09/20/18 118/72   09/05/18 112/63    Weight from Last 3 Encounters:   11/28/18 183 lb (83 kg)   09/20/18 178 lb (80.7 kg)   07/29/18 178 lb (80.7 kg)              We Performed the Following     Beta HCG Qual, Urine - [facility] ([id number])     Wet prep          Today's Medication Changes          These changes are accurate as of 11/28/18 11:59 PM.  If you have any questions, ask your nurse or doctor.               Start taking these medicines.        Dose/Directions    etonogestrel-ethinyl estradiol 0.12-0.015 MG/24HR vaginal ring   Commonly known as:  NUVARING   Used for:  Encounter for initial prescription of vaginal ring hormonal contraceptive   Started by:  Tiffanie Shannon PA-C        Insert 1 ring vaginally every 21 days then remove for 1 week then repeat with new ring.   Quantity:  3 each   Refills:  1            Where to get your medicines      These medications were sent to [facility] - [address], [facility] [address]     Phone:  [phone]     EPINEPHrine 0.3 MG/0.3ML injection 2-pack    etonogestrel-ethinyl estradiol 0.12-0.015 MG/24HR vaginal ring    ranitidine 150 MG tablet    sodium chloride-sodium bicarb 2300-700 MG Kit                Primary Care Provider Office Phone # Fax #    Tiffanie Shannon PA-C [phone]       [address] [place] [address]   0  [facility] [address]        Equal Access to Services     CAROLA MALIK AH: Kyle myers Soomaali, waaxda luqadaha, qaybta kaalmada adejuan m, kev gonsalez " trina florenceaddiesumit horner'aagilda ah. So [facility] [phone].    ATENCIÓN: Si sarah simon, tiene a novoa disposición servicios gratuitos de asistencia lingüística. Thuy al [phone].    We comply with applicable federal civil rights laws and [place] laws. We do not discriminate on the basis of race, color, national origin, age, disability, sex, sexual orientation, or gender identity.            Thank you!     Thank you for choosing [facility]  for your care. Our goal is always to provide you with excellent care. Hearing back from our patients is one way we can continue to improve our services. Please take a few minutes to complete the written survey that you may receive in the mail after your visit with us. Thank you!             Your Updated Medication List - Protect others around you: Learn how to safely use, store and throw away your medicines at www.disposemymeds.org.          This list is accurate as of 11/28/18 11:59 PM.  Always use your most recent med list.                   Brand Name Dispense Instructions for use Diagnosis    acetaminophen 500 MG tablet    TYLENOL    30 tablet    Take 2 tablets (1,000 mg) by mouth every 8 hours as needed for mild pain    Tonsillolith       EPINEPHrine 0.3 MG/0.3ML injection 2-pack    EPIPEN/ADRENACLICK/or ANY BX GENERIC EQUIV    0.6 mL    Inject 0.3 mLs (0.3 mg) into the muscle as needed for anaphylaxis    Peanut allergy       etonogestrel-ethinyl estradiol 0.12-0.015 MG/24HR vaginal ring    NUVARING    3 each    Insert 1 ring vaginally every 21 days then remove for 1 week then repeat with new ring.    Encounter for initial prescription of vaginal ring hormonal contraceptive       lidocaine 5 % patch    LIDODERM    15 patch    Apply up to 1 patch to painful area for up to 12 h within a 24 h period.  Remove after 12 hours.    Costochondritis       loratadine 10 MG tablet    CLARITIN    30 tablet    Take 1 tablet (10 mg) by mouth daily    Seasonal allergic rhinitis,  unspecified chronicity, unspecified trigger       order for DME     1 Units    Equipment being ordered: Shower chair with back, 400lb weight capacity    Balance problems       ranitidine 150 MG tablet    ZANTAC    60 tablet    Take 1 tablet (150 mg) by mouth 2 times daily    Gastroesophageal reflux disease, esophagitis presence not specified       sodium chloride-sodium bicarb 2300-700 MG Kit    CLASSIC NETI POT SINUS WASH    1 kit    Spray 1 packet in nostril daily    Seasonal allergic rhinitis, unspecified trigger       tretinoin 0.025 % external cream    RETIN-A    45 g    Spread a pea size amount into affected area topically at bedtime.  Use sunscreen SPF>20.    Acne vulgaris       vitamin D3 1000 units (25 mcg) tablet    CHOLECALCIFEROL    100 tablet    Take 1 tablet (1,000 Units) by mouth daily    Bipolar affective disorder, remission status unspecified (H)

## 2018-11-28 NOTE — PROGRESS NOTES
SUBJECTIVE:   CC: Bernarda Garrett is an 48 year old woman who presents for preventive health visit.     Healthy Habits:    Do you get at least three servings of calcium containing foods daily (dairy, green leafy vegetables, etc.)? no, taking calcium and/or vitamin D supplement: Vitamin D -- no appetite since mom passed 09/2017    Amount of exercise or daily activities, outside of work: none    Problems taking medications regularly No    Medication side effects: No    Have you had an eye exam in the past two years? yes    Do you see a dentist twice per year? yes    Do you have sleep apnea, excessive snoring or daytime drowsiness?no    Birth control - seeing someone and wants to start the nuvaring agian    PROBLEMS TO ADD ON...  Asking for muscle relaxer to help calm her and help her sleep.      Today's PHQ-2 Score: 1-1  PHQ-2 ( 1999 Pf[employer]) 6/29/2016 1/23/2015   Q1: Little interest or pleasure in doing things 3 3   Q2: Feeling down, depressed or hopeless 3 3   PHQ-2 Score 6 6     Abuse: Current or Past(Physical, Sexual or Emotional)- No  Do you feel safe in your environment? Does not feel safe on block -     Social History   Substance Use Topics     Smoking status: Never Smoker     Smokeless tobacco: Never Used     Alcohol use No     If you drink alcohol do you typically have >3 drinks per day or >7 drinks per week? Not Applicable                     Reviewed orders with patient.  Reviewed health maintenance and updated orders accordingly - Yes  Labs reviewed in [facility]    Mammogram Screening: Patient under age 50, mutual decision reflected in health maintenance.      Pertinent mammograms are reviewed under the imaging tab.  History of abnormal Pap smear:   Last 3 Pap Results:   PAP (no units)   Date Value   06/29/2016 NIL     PAP / HPV 6/29/2016   PAP NIL     Reviewed and updated as needed this visit by clinical staff  Tobacco  Allergies  Meds  Med Hx  Surg Hx  Fam Hx  Soc Hx        Reviewed and updated as needed  "this visit by Provider            ROS:  CONSTITUTIONAL: NEGATIVE for fever, chills, change in weight  INTEGUMENTARU/SKIN: NEGATIVE for worrisome rashes, moles or lesions  EYES: NEGATIVE for vision changes or irritation  ENT: NEGATIVE for ear, mouth and throat problems  RESP: NEGATIVE for significant cough or SOB  BREAST: NEGATIVE for masses, tenderness or discharge  CV: NEGATIVE for chest pain, palpitations or peripheral edema  GI: NEGATIVE for nausea, abdominal pain, heartburn, or change in bowel habits  : NEGATIVE for unusual urinary or vaginal symptoms. Periods are regular.  MUSCULOSKELETAL: NEGATIVE for significant arthralgias or myalgia  NEURO: NEGATIVE for weakness, dizziness or paresthesias  PSYCHIATRIC: NEGATIVE for changes in mood or affect    OBJECTIVE:   /72 (BP Location: Left arm, Patient Position: Chair, Cuff Size: Adult Large)  Pulse 66  Temp 98.2  F (36.8  C) (Oral)  Ht 5' 5\" (1.651 m)  Wt 183 lb (83 kg)  LMP 10/10/2018 (Exact Date)  SpO2 100%  Breastfeeding? No  BMI 30.45 kg/m2  EXAM:  GENERAL: healthy, alert and no distress  EYES: Eyes grossly normal to inspection, PERRL and conjunctivae and sclerae normal  HENT: ear canals and TM's normal, nose and mouth without ulcers or lesions  NECK: no adenopathy, no asymmetry, masses, or scars and thyroid normal to palpation  RESP: lungs clear to auscultation - no rales, rhonchi or wheezes  BREAST: normal without masses, tenderness or nipple discharge and no palpable axillary masses or adenopathy  CV: regular rate and rhythm, normal S1 S2, no S3 or S4, no murmur, click or rub, no peripheral edema and peripheral pulses strong  ABDOMEN: soft, nontender, no hepatosplenomegaly, no masses and bowel sounds normal   (female): normal female external genitalia, normal urethral meatus, vaginal mucosa pink, moist, well rugated, and normal cervix, no discharge noted  MS: no gross musculoskeletal defects noted, no edema  SKIN: no suspicious lesions or " rashes  NEURO: Normal strength and tone, mentation intact and speech normal  PSYCH: mentation appears normal, affect normal/bright    Diagnostic Test Results:  Results for orders placed or performed in visit on 11/28/18 (from the past 24 hour(s))   Wet prep   Result Value Ref Range    Specimen Description Vagina     Wet Prep No Trichomonas seen     Wet Prep No clue cells seen     Wet Prep No yeast seen    Beta HCG Qual, Urine - [facility] ([id number])   Result Value Ref Range    Beta HCG Qual IFA Urine Negative NEG^Negative          ASSESSMENT/PLAN:   1. Routine general medical examination at a health care facility      2. Vaginal discharge    - Wet prep  - Chlamydia trachomatis PCR; Future  - Neisseria gonorrhoeae PCR; Future    3. Vaginal odor    - Chlamydia trachomatis PCR; Future  - Neisseria gonorrhoeae PCR; Future    4. Peanut allergy    - EPINEPHrine (EPIPEN/ADRENACLICK/OR ANY BX GENERIC EQUIV) 0.3 MG/0.3ML injection 2-pack; Inject 0.3 mLs (0.3 mg) into the muscle as needed for anaphylaxis  Dispense: 0.6 mL; Refill: 1    5. Seasonal allergic rhinitis, unspecified trigger    - sodium chloride-sodium bicarb (CLASSIC NETI POT SINUS WASH) 2300-700 MG KIT; Spray 1 packet in nostril daily  Dispense: 1 kit; Refill: 0    6. Gastroesophageal reflux disease, esophagitis presence not specified    - ranitidine (ZANTAC) 150 MG tablet; Take 1 tablet (150 mg) by mouth 2 times daily  Dispense: 60 tablet; Refill: 3    7. Encounter for initial prescription of vaginal ring hormonal contraceptive    - etonogestrel-ethinyl estradiol (NUVARING) 0.12-0.015 MG/24HR vaginal ring; Insert 1 ring vaginally every 21 days then remove for 1 week then repeat with new ring.  Dispense: 1 each; Refill: 1  - Beta HCG Qual, Urine - [facility] ([id number])    -- Patient to followup in about 4-6 weeks for a medication recheck, as this is a new prescription for her.      8. Screening cholesterol level    - Lipid panel reflex to direct  "LDL Fasting; Future    9. Screening for diabetes mellitus    - **Basic metabolic panel FUTURE anytime; Future    10. Screening for HIV (human immunodeficiency virus)    - HIV Screening; Future    11. Encounter for screening mammogram for breast cancer    - *MA Screening Digital Bilateral; Future      Of Note:  Patient asked for a muscle relaxant to help her relax enough to fall asleep at night.  She states that she has been on Klonopin in the past with good efficacy.  She is not currently taking any medication for anxiety and/or depression, but reports that she has a therapist she started seeing every Thursday.  She says that her therapist is capable of prescribing, but they have \"not gotten to that point yet.\"  I discussed with patient that she may be better served with a separate office visit for this and to also speak to her therapist about this request.  I advised a prescription for Hydroxyzine for patient to take as needed for acute anxiety/panic, but she declined today.        COUNSELING:   Reviewed preventive health counseling, as reflected in patient instructions    BP Readings from Last 1 Encounters:   11/28/18 114/72     Estimated body mass index is 30.45 kg/(m^2) as calculated from the following:    Height as of this encounter: 5' 5\" (1.651 m).    Weight as of this encounter: 183 lb (83 kg).      Weight management plan: Discussed healthy diet and exercise guidelines     reports that she has never smoked. She has never used smokeless tobacco.      Counseling Resources:  ATP IV Guidelines  Pooled Cohorts Equation Calculator  Breast Cancer Risk Calculator  FRAX Risk Assessment  ICSI Preventive Guidelines  Dietary Guidelines for Americans, 2010  USDA's MyPlate  ASA Prophylaxis  Lung CA Screening    Tiffanie Shannon PA-C  [facility]  "

## 2018-12-04 ENCOUNTER — OFFICE VISIT (OUTPATIENT)
Dept: FAMILY MEDICINE | Facility: CLINIC | Age: 48
End: 2018-12-04
Payer: MEDICARE

## 2018-12-04 VITALS
HEIGHT: 65 IN | TEMPERATURE: 99.1 F | OXYGEN SATURATION: 98 % | BODY MASS INDEX: 29.82 KG/M2 | WEIGHT: 179 LBS | DIASTOLIC BLOOD PRESSURE: 74 MMHG | HEART RATE: 85 BPM | SYSTOLIC BLOOD PRESSURE: 124 MMHG

## 2018-12-04 DIAGNOSIS — N89.8 VAGINAL DISCHARGE: ICD-10-CM

## 2018-12-04 DIAGNOSIS — Z11.4 SCREENING FOR HIV (HUMAN IMMUNODEFICIENCY VIRUS): ICD-10-CM

## 2018-12-04 DIAGNOSIS — M54.41 CHRONIC BILATERAL LOW BACK PAIN WITH RIGHT-SIDED SCIATICA: Primary | ICD-10-CM

## 2018-12-04 DIAGNOSIS — Z23 NEED FOR PROPHYLACTIC VACCINATION AND INOCULATION AGAINST INFLUENZA: ICD-10-CM

## 2018-12-04 DIAGNOSIS — Z13.1 SCREENING FOR DIABETES MELLITUS: ICD-10-CM

## 2018-12-04 DIAGNOSIS — Z13.220 SCREENING CHOLESTEROL LEVEL: ICD-10-CM

## 2018-12-04 DIAGNOSIS — N89.8 VAGINAL ODOR: ICD-10-CM

## 2018-12-04 DIAGNOSIS — G89.29 CHRONIC BILATERAL LOW BACK PAIN WITH RIGHT-SIDED SCIATICA: Primary | ICD-10-CM

## 2018-12-04 PROCEDURE — 87491 CHLMYD TRACH DNA AMP PROBE: CPT | Performed by: PHYSICIAN ASSISTANT

## 2018-12-04 PROCEDURE — G0008 ADMIN INFLUENZA VIRUS VAC: HCPCS | Performed by: PHYSICIAN ASSISTANT

## 2018-12-04 PROCEDURE — 80048 BASIC METABOLIC PNL TOTAL CA: CPT | Performed by: PHYSICIAN ASSISTANT

## 2018-12-04 PROCEDURE — 80061 LIPID PANEL: CPT | Performed by: PHYSICIAN ASSISTANT

## 2018-12-04 PROCEDURE — 87591 N.GONORRHOEAE DNA AMP PROB: CPT | Performed by: PHYSICIAN ASSISTANT

## 2018-12-04 PROCEDURE — 87389 HIV-1 AG W/HIV-1&-2 AB AG IA: CPT | Performed by: PHYSICIAN ASSISTANT

## 2018-12-04 PROCEDURE — 36415 COLL VENOUS BLD VENIPUNCTURE: CPT | Performed by: PHYSICIAN ASSISTANT

## 2018-12-04 PROCEDURE — 90686 IIV4 VACC NO PRSV 0.5 ML IM: CPT | Performed by: PHYSICIAN ASSISTANT

## 2018-12-04 PROCEDURE — 99214 OFFICE O/P EST MOD 30 MIN: CPT | Mod: 25 | Performed by: PHYSICIAN ASSISTANT

## 2018-12-04 RX ORDER — CYCLOBENZAPRINE HCL 5 MG
5 TABLET ORAL 3 TIMES DAILY PRN
Qty: 42 TABLET | Refills: 0 | Status: SHIPPED | OUTPATIENT
Start: 2018-12-04 | End: 2019-05-14

## 2018-12-04 NOTE — MR AVS SNAPSHOT
After Visit Summary   12/4/2018    Bernarda Garrett    MRN: [id number]           Patient Information     Date Of Birth          1970        Visit Information        Provider Department      12/4/2018 2:00 PM Tiffanie Shannon, PA-C [facility] [place]        Today's Diagnoses     Need for prophylactic vaccination and inoculation against influenza    -  1    Chronic bilateral low back pain with right-sided sciatica          Care Instructions    Can take the flexeril up to three times daily if needed.  Please do not take with any opioids or other controlled substances.      Please decrease and try stopping the ibuprofen/Advil use.      Can take tylenol is needed for any break through pain.      Please followup with physical therapy.      Please get the MRI scans to us for further review.            Follow-ups after your visit        Additional Services     PHYSICAL THERAPY REFERRAL       If you have not heard from the scheduling office within 2 business days, please call [phone] for all locations, with the exception of [place], please call [phone] and [place], please call [phone].    Please be aware that coverage of these services is subject to the terms and limitations of your health insurance plan.  Call member services at your health plan with any benefit or coverage questions.                  Follow-up notes from your care team     Return in about 6 weeks (around 1/15/2019) for Re-check of symptoms, please be seen sooner if needed.      Future tests that were ordered for you today     Open Future Orders        Priority Expected Expires Ordered    PHYSICAL THERAPY REFERRAL Routine  12/4/2019 12/4/2018            Who to contact     If you have questions or need follow up information about today's clinic visit or your schedule please contact [facility] directly at [phone].  Normal or non-critical lab and imaging results will be communicated to you by  "MyChart, letter or phone within 4 business days after the clinic has received the results. If you do not hear from us within 7 days, please contact the clinic through [employer] or phone. If you have a critical or abnormal lab result, we will notify you by phone as soon as possible.  Submit refill requests through [employer] or call your pharmacy and they will forward the refill request to us. Please allow 3 business days for your refill to be completed.          Additional Information About Your Visit        Mobihart Information     [employer] gives you secure access to your electronic health record. If you see a primary care provider, you can also send messages to your care team and make appointments. If you have questions, please call your primary care clinic.  If you do not have a primary care provider, please call [phone] and they will assist you.        Care EveryWhere ID     This is your Care EveryWhere ID. This could be used by other organizations to access your [facility] medical records  [id number]        Your Vitals Were     Pulse Temperature Height Pulse Oximetry Breastfeeding? BMI (Body Mass Index)    85 99.1  F (37.3  C) 5' 5\" (1.651 m) 98% No 29.79 kg/m2       Blood Pressure from Last 3 Encounters:   12/04/18 124/74   11/28/18 114/72   09/20/18 118/72    Weight from Last 3 Encounters:   12/04/18 179 lb (81.2 kg)   11/28/18 183 lb (83 kg)   09/20/18 178 lb (80.7 kg)              We Performed the Following     FLU VACCINE, SPLIT VIRUS, IM (QUADRIVALENT) [84628]- >3 YRS     Vaccine Administration, Initial [95017]          Today's Medication Changes          These changes are accurate as of 12/4/18  3:10 PM.  If you have any questions, ask your nurse or doctor.               Start taking these medicines.        Dose/Directions    cyclobenzaprine 5 MG tablet   Commonly known as:  FLEXERIL   Used for:  Chronic bilateral low back pain with right-sided sciatica   Started by:  Tiffanie Shannon PA-C        Dose:  " 5 mg   Take 1 tablet (5 mg) by mouth 3 times daily as needed for muscle spasms   Quantity:  42 tablet   Refills:  0            Where to get your medicines      These medications were sent to [facility] [place] - [place], [place] - [address]  [address], [facility] [address]     Phone:  [phone]     cyclobenzaprine 5 MG tablet                Primary Care Provider Office Phone # Fax #    Tiffanie Shannon PA-C [phone]       [address] [place] [address]   0  [facility] [address]        Equal Access to Services     [facility]: Hadii aad ku hadasho Soomaali, waaxda luqadaha, qaybta kaalmada adeegyada, kev meyer . So [facility] [phone].    ATENCIÓN: Si habla español, tiene a novoa disposición servicios gratuitos de asistencia lingüística. [facility] [phone].    We comply with applicable federal civil rights laws and [place] laws. We do not discriminate on the basis of race, color, national origin, age, disability, sex, sexual orientation, or gender identity.            Thank you!     Thank you for choosing [facility]  for your care. Our goal is always to provide you with excellent care. Hearing back from our patients is one way we can continue to improve our services. Please take a few minutes to complete the written survey that you may receive in the mail after your visit with us. Thank you!             Your Updated Medication List - Protect others around you: Learn how to safely use, store and throw away your medicines at www.disposemymeds.org.          This list is accurate as of 12/4/18  3:10 PM.  Always use your most recent med list.                   Brand Name Dispense Instructions for use Diagnosis    acetaminophen 500 MG tablet    TYLENOL    30 tablet    Take 2 tablets (1,000 mg) by mouth every 8 hours as needed for mild pain    Tonsillolith       cyclobenzaprine 5 MG tablet    FLEXERIL    42 tablet    Take 1  tablet (5 mg) by mouth 3 times daily as needed for muscle spasms    Chronic bilateral low back pain with right-sided sciatica       EPINEPHrine 0.3 MG/0.3ML injection 2-pack    EPIPEN/ADRENACLICK/or ANY BX GENERIC EQUIV    0.6 mL    Inject 0.3 mLs (0.3 mg) into the muscle as needed for anaphylaxis    Peanut allergy       etonogestrel-ethinyl estradiol 0.12-0.015 MG/24HR vaginal ring    NUVARING    3 each    Insert 1 ring vaginally every 21 days then remove for 1 week then repeat with new ring.    Encounter for initial prescription of vaginal ring hormonal contraceptive       lidocaine 5 % patch    LIDODERM    15 patch    Apply up to 1 patch to painful area for up to 12 h within a 24 h period.  Remove after 12 hours.    Costochondritis       loratadine 10 MG tablet    CLARITIN    30 tablet    Take 1 tablet (10 mg) by mouth daily    Seasonal allergic rhinitis, unspecified chronicity, unspecified trigger       order for DME     1 Units    Equipment being ordered: Shower chair with back, 400lb weight capacity    Balance problems       ranitidine 150 MG tablet    ZANTAC    60 tablet    Take 1 tablet (150 mg) by mouth 2 times daily    Gastroesophageal reflux disease, esophagitis presence not specified       sodium chloride-sodium bicarb 2300-700 MG Kit    CLASSIC NETI POT SINUS WASH    1 kit    Spray 1 packet in nostril daily    Seasonal allergic rhinitis, unspecified trigger       tretinoin 0.025 % external cream    RETIN-A    45 g    Spread a pea size amount into affected area topically at bedtime.  Use sunscreen SPF>20.    Acne vulgaris       vitamin D3 1000 units (25 mcg) tablet    CHOLECALCIFEROL    100 tablet    Take 1 tablet (1,000 Units) by mouth daily    Bipolar affective disorder, remission status unspecified (H)

## 2018-12-04 NOTE — PROGRESS NOTES
SUBJECTIVE:                                                    Bernarda Garrett is a 48 year old female who presents to clinic today for the following health issues:    Back Pain       Duration: years -- 10 years, sciatic pain for the past 8 years.          Specific cause: Pregnancy for the sciatica    Description:   Location of pain: sciatic nerve pain Right side always hurts, low back  Character of pain: gnawing  Pain radiation:radiates into the right buttocks and radiates into the right leg  New numbness or weakness in legs, not attributed to pain:  no     Intensity: Currently 7/10, At its worst 10/10    History:   Pain interferes with job: Not applicable  History of back problems: Yes  Any previous MRI or X-rays: Yes--Chiropractor Ordered.  Date This past month  Sees a specialist for back pain:  No  Therapies tried without relief: Ibuprofen, acupuncture and acetaminophen (Tylenol), water therapy    Alleviating factors:   Improved by: muscle relaxants      Precipitating factors:  Worsened by: Sitting      Accompanying Signs & Symptoms:  Risk of Fracture:  None  Risk of Cauda Equina:  None  Risk of Infection:  None  Risk of Cancer:  None  Risk of Ankylosing Spondylitis:  Onset at age <35, male, AND morning back stiffness. no     Patient is here to address her back pain.  She states that she has had issues with back pain for the past 10 years about.  She says in being pregnant the back pain presented and worsens.  She has been seeing chiropractic care and they recently did a total spine MRI.  SHe says they did not comment on any concerns from the MRI, but she does not have the report to review today.  She is not having numbness or tingling with the back pain, but does have some pain radiate down the right leg.  She does not get relief with chiropractic care and has recently tried acupuncture without relief.  She is here today for medication help as muscle relaxers have helped in the past.  She is currently taking  "ibuprofen 4 times daily for the back pain.      Problem list and histories reviewed & adjusted, as indicated.  Additional history: as documented      ROS:  Constitutional, HEENT, cardiovascular, pulmonary, GI, , musculoskeletal, neuro, skin, endocrine and psych systems are negative, except as otherwise noted.    OBJECTIVE:                                                    /74 (BP Location: Right arm, Patient Position: Chair, Cuff Size: Adult Large)  Pulse 85  Temp 99.1  F (37.3  C)  Ht 5' 5\" (1.651 m)  Wt 179 lb (81.2 kg)  SpO2 98%  Breastfeeding? No  BMI 29.79 kg/m2  Body mass index is 29.79 kg/(m^2).  GENERAL: healthy, alert and no distress  EYES: Eyes grossly normal to inspection, PERRL and conjunctivae and sclerae normal  MS: no gross musculoskeletal defects noted, no edema  SKIN: no suspicious lesions or rashes  NEURO: Normal strength and tone, mentation intact and speech normal  BACK: no CVA tenderness, no paralumbar tenderness  PSYCH: mentation appears normal, affect normal/bright    Diagnostic Test Results:  none      ASSESSMENT/PLAN:                                                      Bernarda was seen today for recheck medication.    Diagnoses and all orders for this visit:    Chronic bilateral low back pain with right-sided sciatica  -     PHYSICAL THERAPY REFERRAL; Future  -     cyclobenzaprine (FLEXERIL) 5 MG tablet; Take 1 tablet (5 mg) by mouth 3 times daily as needed for muscle spasms    Vaginal discharge  -     Chlamydia trachomatis PCR  -     Neisseria gonorrhoeae PCR    Vaginal odor  -     Chlamydia trachomatis PCR  -     Neisseria gonorrhoeae PCR    Screening for HIV (human immunodeficiency virus)  -     HIV Screening    Screening cholesterol level  -     Lipid panel reflex to direct LDL Fasting    Screening for diabetes mellitus  -     **Basic metabolic panel FUTURE anytime    Need for prophylactic vaccination and inoculation against influenza  -     FLU VACCINE, SPLIT VIRUS, IM " (QUADRIVALENT) [22786]- >3 YRS  -     Vaccine Administration, Initial [46466]      Followup: Return in about 6 weeks (around 1/15/2019) for Re-check of symptoms, please be seen sooner if needed.    See Patient Instructions:  Can take the flexeril up to three times daily if needed.  Please do not take with any opioids or other controlled substances.      Please decrease and try stopping the ibuprofen/Advil use.      Can take tylenol is needed for any break through pain.      Please followup with physical therapy.      Please get the MRI scans to us for further review.      -- I have discussed the patient's diagnosis, and my plan of treatment with the patient and/or family. Patient is aware to followup if symptoms do not improve.  Patient has been advised to be seen sooner or seek more immediate care if symptoms change or worsen.  Patient agrees with and understands the plan today.         Tiffanie Shannon PA-C    [facility]      Injectable Influenza Immunization Documentation    1.  Is the person to be vaccinated sick today?   No    2. Does the person to be vaccinated have an allergy to a component   of the vaccine?   No  Egg Allergy Algorithm Link    3. Has the person to be vaccinated ever had a serious reaction   to influenza vaccine in the past?   No    4. Has the person to be vaccinated ever had Guillain-Barré syndrome?   No    Form completed by Pt

## 2018-12-04 NOTE — PATIENT INSTRUCTIONS
Can take the flexeril up to three times daily if needed.  Please do not take with any opioids or other controlled substances.      Please decrease and try stopping the ibuprofen/Advil use.      Can take tylenol is needed for any break through pain.      Please followup with physical therapy.      Please get the MRI scans to us for further review.

## 2018-12-05 LAB
ANION GAP SERPL CALCULATED.3IONS-SCNC: 9 MMOL/L (ref 3–14)
BUN SERPL-MCNC: 11 MG/DL (ref 7–30)
CALCIUM SERPL-MCNC: 8.7 MG/DL (ref 8.5–10.1)
CHLORIDE SERPL-SCNC: 109 MMOL/L (ref 94–109)
CHOLEST SERPL-MCNC: 209 MG/DL
CO2 SERPL-SCNC: 21 MMOL/L (ref 20–32)
CREAT SERPL-MCNC: 1 MG/DL (ref 0.52–1.04)
GFR SERPL CREATININE-BSD FRML MDRD: 60 ML/MIN/1.7M2
GLUCOSE SERPL-MCNC: 90 MG/DL (ref 70–99)
HDLC SERPL-MCNC: 60 MG/DL
LDLC SERPL CALC-MCNC: 126 MG/DL
NONHDLC SERPL-MCNC: 149 MG/DL
POTASSIUM SERPL-SCNC: 4.3 MMOL/L (ref 3.4–5.3)
SODIUM SERPL-SCNC: 139 MMOL/L (ref 133–144)
TRIGL SERPL-MCNC: 117 MG/DL

## 2018-12-06 LAB
C TRACH DNA SPEC QL NAA+PROBE: NEGATIVE
HIV 1+2 AB+HIV1 P24 AG SERPL QL IA: NONREACTIVE
N GONORRHOEA DNA SPEC QL NAA+PROBE: NEGATIVE
SPECIMEN SOURCE: NORMAL
SPECIMEN SOURCE: NORMAL

## 2018-12-07 NOTE — PROGRESS NOTES
Gabrielle Menchaca ,    The results from your recent lab work are within normal limits.    -LDL(bad) cholesterol level is elevated which can increase your heart disease risk.  A diet high in fat and simple carbohydrates, genetics and being overweight can contribute to this. ADVISE: exercising 150 minutes of aerobic exercise per week (30 minutes for 5 days per week or 50 minutes for 3 days per week are options) and eating a low saturated fat/low carbohydrate diet are helpful to improve this. In 12 months, you should recheck your fasting cholesterol panel by scheduling a lab-only appointment.      Thank you for choosing [facility] for your health care needs,      Tiffanie Shannon PA-C

## 2018-12-10 ENCOUNTER — MYC MEDICAL ADVICE (OUTPATIENT)
Dept: FAMILY MEDICINE | Facility: CLINIC | Age: 48
End: 2018-12-10

## 2019-01-21 ENCOUNTER — APPOINTMENT (OUTPATIENT)
Dept: GENERAL RADIOLOGY | Facility: CLINIC | Age: 49
End: 2019-01-21
Payer: MEDICARE

## 2019-01-21 ENCOUNTER — HOSPITAL ENCOUNTER (EMERGENCY)
Facility: CLINIC | Age: 49
Discharge: HOME | End: 2019-01-21
Attending: PHYSICIAN ASSISTANT | Admitting: PHYSICIAN ASSISTANT
Payer: MEDICARE

## 2019-01-21 VITALS
OXYGEN SATURATION: 100 % | RESPIRATION RATE: 18 BRPM | DIASTOLIC BLOOD PRESSURE: 99 MMHG | TEMPERATURE: 98.7 F | WEIGHT: 181 LBS | BODY MASS INDEX: 30.12 KG/M2 | HEART RATE: 61 BPM | SYSTOLIC BLOOD PRESSURE: 159 MMHG

## 2019-01-21 DIAGNOSIS — R07.9 CHEST PAIN, UNSPECIFIED TYPE: ICD-10-CM

## 2019-01-21 DIAGNOSIS — B97.89 ACUTE VIRAL SINUSITIS: ICD-10-CM

## 2019-01-21 DIAGNOSIS — J01.90 ACUTE VIRAL SINUSITIS: ICD-10-CM

## 2019-01-21 LAB
ANION GAP SERPL CALCULATED.3IONS-SCNC: 8 MMOL/L (ref 3–14)
BASOPHILS # BLD AUTO: 0 10E9/L (ref 0–0.2)
BASOPHILS NFR BLD AUTO: 0.6 %
BUN SERPL-MCNC: 15 MG/DL (ref 7–30)
CALCIUM SERPL-MCNC: 8 MG/DL (ref 8.5–10.1)
CHLORIDE SERPL-SCNC: 108 MMOL/L (ref 94–109)
CO2 SERPL-SCNC: 24 MMOL/L (ref 20–32)
CREAT SERPL-MCNC: 1.05 MG/DL (ref 0.52–1.04)
DIFFERENTIAL METHOD BLD: ABNORMAL
EOSINOPHIL # BLD AUTO: 0.1 10E9/L (ref 0–0.7)
EOSINOPHIL NFR BLD AUTO: 1.5 %
ERYTHROCYTE [DISTWIDTH] IN BLOOD BY AUTOMATED COUNT: 15.8 % (ref 10–15)
GFR SERPL CREATININE-BSD FRML MDRD: 63 ML/MIN/{1.73_M2}
GLUCOSE SERPL-MCNC: 98 MG/DL (ref 70–99)
HCT VFR BLD AUTO: 41.1 % (ref 35–47)
HGB BLD-MCNC: 13 G/DL (ref 11.7–15.7)
IMM GRANULOCYTES # BLD: 0 10E9/L (ref 0–0.4)
IMM GRANULOCYTES NFR BLD: 0.2 %
LYMPHOCYTES # BLD AUTO: 2.2 10E9/L (ref 0.8–5.3)
LYMPHOCYTES NFR BLD AUTO: 45.9 %
MCH RBC QN AUTO: 24.8 PG (ref 26.5–33)
MCHC RBC AUTO-ENTMCNC: 31.6 G/DL (ref 31.5–36.5)
MCV RBC AUTO: 78 FL (ref 78–100)
MONOCYTES # BLD AUTO: 0.4 10E9/L (ref 0–1.3)
MONOCYTES NFR BLD AUTO: 8.9 %
NEUTROPHILS # BLD AUTO: 2.1 10E9/L (ref 1.6–8.3)
NEUTROPHILS NFR BLD AUTO: 42.9 %
NRBC # BLD AUTO: 0 10*3/UL
NRBC BLD AUTO-RTO: 0 /100
PLATELET # BLD AUTO: 298 10E9/L (ref 150–450)
POTASSIUM SERPL-SCNC: 3.8 MMOL/L (ref 3.4–5.3)
RBC # BLD AUTO: 5.24 10E12/L (ref 3.8–5.2)
SODIUM SERPL-SCNC: 140 MMOL/L (ref 133–144)
TROPONIN I SERPL-MCNC: <0.015 UG/L (ref 0–0.04)
WBC # BLD AUTO: 4.8 10E9/L (ref 4–11)

## 2019-01-21 PROCEDURE — 93005 ELECTROCARDIOGRAM TRACING: CPT

## 2019-01-21 PROCEDURE — 71046 X-RAY EXAM CHEST 2 VIEWS: CPT

## 2019-01-21 PROCEDURE — 36415 COLL VENOUS BLD VENIPUNCTURE: CPT | Performed by: PHYSICIAN ASSISTANT

## 2019-01-21 PROCEDURE — 84484 ASSAY OF TROPONIN QUANT: CPT | Performed by: PHYSICIAN ASSISTANT

## 2019-01-21 PROCEDURE — 80048 BASIC METABOLIC PNL TOTAL CA: CPT | Performed by: PHYSICIAN ASSISTANT

## 2019-01-21 PROCEDURE — 85025 COMPLETE CBC W/AUTO DIFF WBC: CPT | Performed by: PHYSICIAN ASSISTANT

## 2019-01-21 PROCEDURE — 99285 EMERGENCY DEPT VISIT HI MDM: CPT | Mod: 25

## 2019-01-21 ASSESSMENT — ENCOUNTER SYMPTOMS
NAUSEA: 0
SINUS PAIN: 1
FEVER: 0
COUGH: 1
VOMITING: 0
HEADACHES: 1
SHORTNESS OF BREATH: 0

## 2019-01-21 NOTE — ED AVS SNAPSHOT
[facility]  [address]  [facility] [address]  Phone:  [phone]  Fax:  [phone]                                    Bernarda Garrett   MRN: [id number]    Department:  [facility]   Date of Visit:  1/21/2019           After Visit Summary Signature Page    I have received my discharge instructions, and my questions have been answered. I have discussed any challenges I see with this plan with the nurse or doctor.    ..........................................................................................................................................  Patient/Patient Representative Signature      ..........................................................................................................................................  Patient Representative Print Name and Relationship to Patient    ..................................................               ................................................  Date                                   Time    ..........................................................................................................................................  Reviewed by Signature/Title    ...................................................              ..............................................  Date                                               Time          22EPIC Rev 08/18

## 2019-01-22 LAB — INTERPRETATION ECG - MUSE: NORMAL

## 2019-01-22 NOTE — ED TRIAGE NOTES
At triage screening, patient states is not safe at home. States significant other just got out of snf and is trying to find another place to go.

## 2019-01-22 NOTE — ED TRIAGE NOTES
Presents to the ED with cough, congestion and headaches. States has left sided chest wall pain that is worse with deep inspiration and coughing.

## 2019-01-22 NOTE — ED PROVIDER NOTES
History     Chief Complaint:  Chest Wall Pain and Cough    HPI   Bernarda Garrett is a 49 year old female who presents to the emergency department today with chest wall pain and cough. Patient reports chest pain for the last 3 days that got worse today, with left arm pain that started today, cough, sinus pain, headache, and ear pain have been present for the last 4 days. Patient reports the pain has been constant for the last 2 days. Pain is achy. She also notes she is on her period so some of her pain may be related to that. Patient denies fever, nausea, vomiting, diarrhea. She denies shortness of breath. The pain is not worse with exertion. Reports a history of costochondritis and thinks this feels similar.    Allergies:  Peanuts [Nuts]  Dilaudid [Hydromorphone]  Minocycline  Latex     Medications:    Acetaminophen (Tylenol) 500 Mg Tablet  Cholecalciferol (Vitamin D3) 1000 Unit Tablet  Cyclobenzaprine (Flexeril) 5 Mg Tablet  Epinephrine (Epipen/adrenaclick/or Any Bx Generic Equiv) 0.3 Mg/0.3ml Injection 2-pack  Etonogestrel-ethinyl Estradiol (Nuvaring) 0.12-0.015 Mg/24hr Vaginal Ring  Lidocaine (Lidoderm) 5 % Patch  Loratadine (Claritin) 10 Mg Tablet  Order For Dme  Ranitidine (Zantac) 150 Mg Tablet  Sodium Chloride-sodium Bicarb (Classic Neti Pot Sinus Wash) 2300-700 Mg Kit  Tretinoin (Retin-a) 0.025 % Cream    Past Medical History:    Hypertension  Bipolar  Heart murmur   Vaginal discharge  Mitral valve disorder     Past Surgical History:    History reviewed. No pertinent past surgical history.    Family History:    History reviewed. No pertinent family history.     Social History:  The patient was accompanied to the ED by daughter.  Smoking Status: Never  Smokeless Tobacco: Never  Alcohol Use: No    Marital Status:      Review of Systems   Constitutional: Negative for fever.   HENT: Positive for ear pain and sinus pain.    Respiratory: Positive for cough. Negative for shortness of breath.    Cardiovascular:  Positive for chest pain.   Gastrointestinal: Negative for nausea and vomiting.   Neurological: Positive for headaches.   All other systems reviewed and are negative.      Physical Exam     Patient Vitals for the past 24 hrs:   BP Temp Pulse Resp SpO2 Weight   01/21/19 2054 (!) 159/99 -- 61 18 100 % --   01/21/19 1916 -- -- -- -- -- 82.1 kg (181 lb)   01/21/19 1853 (!) 146/112 98.7  F (37.1  C) 71 16 98 % --      Physical Exam  Constitutional: comfortable appearing, in no acute distress.   Head: No external signs of trauma noted to head or face.   Eyes: Pupils are equal, round, and reactive to light. Conjunctiva normal. EOMI.  ENT: External ears, canals, and TMs normal bilaterally. Nose normal without deformity. MMM. Oropharynx is not erythematous, no tonsillar enlargement or exudate. Normal voice.   Neck: no lymphadenopathy. Normal ROM.  Cardiovascular: Normal rate, regular rhythm, and intact distal pulses.    Respiratory: Effort normal. No respiratory distress. Lungs clear to auscultation bilaterally.   GI: Soft. There is no tenderness. There is no rebound.   Musculoskeletal: No deformities appreciated. Normal ROM. No edema noted.  Chest wall is tender to palpation along left sternal border.   Neurological: Alert and Oriented x 3. Speech normal. Moves all extremities equally.   Psychiatric: Appropriate mood, affect, and behavior.   Skin: Skin is warm and dry.     Emergency Department Course   EKG: normal sinus rhythm without acute ST or T wave changes.     Imaging:  Radiology findings were communicated with the patient and family who voiced understanding of the findings.  XR Chest 2 Views   Final Result   IMPRESSION: No significant interval change.    Heart and pulmonary vessels within normal limits. Lungs clear. No   pleural effusion.      CARMEN BURCIAGA MD      Report per radiology      Laboratory:  Laboratory findings were communicated with the patient and family who voiced understanding of the  findings.  Troponin (Collected ): <0.015  BMP: 8.0 Ca o/w WNL (Creatinine 1.05)   CBC: AWNL (WBC 4.8, HGB 13.0, )     Emergency Department Course:  Nursing notes and vitals reviewed.   I performed an exam of the patient as documented above.   IV was inserted and blood was drawn for laboratory testing, results above.  The patient was sent for a Chest XR while in the emergency department, results above.   : Findings and plan explained to the Patient. Patient discharged home with instructions regarding supportive care, medications, and reasons to return. The importance of close follow-up was reviewed. I personally reviewed the laboratory and imaging results with the Patient and answered all related questions prior to discharge.     Impression & Plan    Medical Decision Makin year old female presenting with chest pain. Differential diagnosis includes ACS, PE, pneumothorax, costochondritis, aortic dissection, pneumonia, CHF, among others. EKG did not reveal any acute ischemia. Troponin is negative, but given constant symptoms for at least the past 2 days, this is sufficient to rule out ACS given atypical symptoms. CXR did not reveal any evidence of pneumonia, pneumothorax, pulmonary edema, or effusions. I have low suspicion for aortic dissection or PE at this time. There is no evidence of pericarditis or myocarditis. Pain is reproducible on exam and with her history of costochondritis, this seems the most likely cause of her pain. She also complaints of a few days of sinus congestion which is most consistent with a viral sinusitis. We discussed appropriate symptomatic management for that as there is no indication for antibiotics at this time. Patient is appropriate for discharge home at this time with close follow-up with PCP. Instructed to return to the ED for any new or worsening symptoms, including worsening pain, SOB, or other concerning symptoms.     Diagnosis:    ICD-10-CM    1. Chest pain,  unspecified type R07.9    2. Acute viral sinusitis J01.90     B97.89        Disposition:  discharged to home    Scribe Disclosure:  I, Hoa Short, am serving as a scribe at 9:38 PM on 1/21/2019 to document services personally performed by Yojana Magaña NP based on my observations and the provider's statements to me.    1/21/2019   [facility] EMERGENCY DEPARTMENT       Yojana Magaña PA-C  01/21/19 2143

## 2019-02-18 ENCOUNTER — TELEPHONE (OUTPATIENT)
Dept: FAMILY MEDICINE | Facility: CLINIC | Age: 49
End: 2019-02-18

## 2019-02-18 NOTE — TELEPHONE ENCOUNTER
Date Forms was received: February 18, 2019  Forms received by: Fax    Last office visit: 12/04/2018  Purpose of Form:  [employer] Verification Form  When the form is due:  ASAP  How the form needs to be returned for patient:  Fax      Form received from [facility] - application for [employer].   Need to know what patient's disability is.       Called patient @ [phone]  Patient stated she has sciatica and back problems - stated it was diagnosed at [facility] in [place] ~5-6 years ago  Patient did see SAUMYA ALCANTAR on 12/04/2018 for Chronic Bilateral Low Back Pain with Right-Sided Sciatica  Med Rec done with patient    Patient stated she cannot use public transportation due to anxiety/depression/agoraphobia.         Form given to THIAGO Croft.     Routing to PCP for further review/recommendations/orders.    Jazz Brannon RN  [facility]

## 2019-03-04 NOTE — TELEPHONE ENCOUNTER
Spoke with pt to let her know her PCP is out and will not be able to complete until Friday 03/09/2019 omar return.    Pt is ok with that- but does need ASAP on that day.    Forms are on Guerda's desk as a reminder to give to provider when she returns.    Guerda Mlnarik CMA

## 2019-03-07 NOTE — TELEPHONE ENCOUNTER
Completed forms faxed back to [facility] at [phone].   Originals sent to be scanned.     Oriana Sanderson

## 2019-03-13 ENCOUNTER — TELEPHONE (OUTPATIENT)
Dept: FAMILY MEDICINE | Facility: CLINIC | Age: 49
End: 2019-03-13

## 2019-03-13 NOTE — TELEPHONE ENCOUNTER
Reason for Call:  Other call back    Detailed comments: [employer] paperwork for this Pt will need to be mailed not Faxed. Please mailed, with address on paperwork     Phone Number Patient can be reached at: Cell number on file:    Telephone Information:   Mobile [phone]       Best Time: any    Can we leave a detailed message on this number? Yes    Call taken on 3/13/2019 at 2:32 PM by Aurora Claire

## 2019-03-13 NOTE — TELEPHONE ENCOUNTER
Pt called in and stated the [facility] does not have the forms. I informed her the forms were faxed over 3/7/19. Pt asked us to fax again.     Rody Hercules  Patient

## 2019-03-13 NOTE — TELEPHONE ENCOUNTER
There is no address noted on the form  Where does this need to be mailed to?    Attempt #1  Called # below - unable to LM      Jazz Brannon RN  [place] [facility]

## 2019-03-14 NOTE — TELEPHONE ENCOUNTER
Mailed original copy to [facility] Valentina Hernadez - already scanned into chart    Oriana Sanderson

## 2019-04-25 ENCOUNTER — OFFICE VISIT (OUTPATIENT)
Dept: URGENT CARE | Facility: URGENT CARE | Age: 49
End: 2019-04-25
Payer: MEDICARE

## 2019-04-25 VITALS
SYSTOLIC BLOOD PRESSURE: 134 MMHG | TEMPERATURE: 98.3 F | HEART RATE: 78 BPM | RESPIRATION RATE: 18 BRPM | BODY MASS INDEX: 30.12 KG/M2 | OXYGEN SATURATION: 96 % | DIASTOLIC BLOOD PRESSURE: 84 MMHG | WEIGHT: 181 LBS

## 2019-04-25 DIAGNOSIS — L73.9 FOLLICULITIS: ICD-10-CM

## 2019-04-25 DIAGNOSIS — R59.9 GLANDS SWOLLEN: Primary | ICD-10-CM

## 2019-04-25 DIAGNOSIS — N89.8 VAGINAL DISCHARGE: ICD-10-CM

## 2019-04-25 LAB
SPECIMEN SOURCE: NORMAL
WET PREP SPEC: NORMAL

## 2019-04-25 PROCEDURE — 99214 OFFICE O/P EST MOD 30 MIN: CPT | Performed by: FAMILY MEDICINE

## 2019-04-25 PROCEDURE — 87210 SMEAR WET MOUNT SALINE/INK: CPT | Performed by: FAMILY MEDICINE

## 2019-04-25 NOTE — PROGRESS NOTES
Chief Complaint   Patient presents with     Urgent Care     Derm Problem     Swelling on both sides of neck swollen for few days      SUBJECTIVE:   Bernarda Garrett is a 49 year old female with history of hypertension presenting with a chief complaint of a painful bump in the left side of her neck over the shoulder area for last 1 week.  Also she thinks that she has some swollen glands.  Patient denies any sore throat.  Or any fever or chills.  She also has been noticing some vaginal discharge for the last 2 days but just started her periods.  She is prone to having Bactrim vaginosis infection.  Has no back pain fever chills.  She is an established patient of [facility].  Onset of symptoms was 7 day(s) ago.  Course of illness is waxing and waning.    Severity moderate  Current and Associated symptoms: Painful bump in the  left side of the neck, vaginal discharge  Treatment measures tried include None tried.  Predisposing factors include None.    Past Medical History:   Diagnosis Date     Hypertension      Current Outpatient Medications   Medication Sig Dispense Refill     acetaminophen (TYLENOL) 500 MG tablet Take 2 tablets (1,000 mg) by mouth every 8 hours as needed for mild pain 30 tablet 0     cholecalciferol (VITAMIN D3) 1000 UNIT tablet Take 1 tablet (1,000 Units) by mouth daily 100 tablet 3     cyclobenzaprine (FLEXERIL) 5 MG tablet Take 1 tablet (5 mg) by mouth 3 times daily as needed for muscle spasms 42 tablet 0     EPINEPHrine (EPIPEN/ADRENACLICK/OR ANY BX GENERIC EQUIV) 0.3 MG/0.3ML injection 2-pack Inject 0.3 mLs (0.3 mg) into the muscle as needed for anaphylaxis 0.6 mL 1     etonogestrel-ethinyl estradiol (NUVARING) 0.12-0.015 MG/24HR vaginal ring Insert 1 ring vaginally every 21 days then remove for 1 week then repeat with new ring. 3 each 1     loratadine (CLARITIN) 10 MG tablet Take 1 tablet (10 mg) by mouth daily 30 tablet 1     order for DME Equipment being ordered: Shower chair with back, 400lb weight  capacity 1 Units 0     sodium chloride-sodium bicarb (CLASSIC NETI POT SINUS WASH) 2300-700 MG KIT Spray 1 packet in nostril daily 1 kit 0     tretinoin (RETIN-A) 0.025 % cream Spread a pea size amount into affected area topically at bedtime.  Use sunscreen SPF>20. 45 g 11     Social History     Tobacco Use     Smoking status: Never Smoker     Smokeless tobacco: Never Used   Substance Use Topics     Alcohol use: No     History reviewed. No pertinent family history.      ROS:    10 point ROS of systems including Constitutional, Eyes, Respiratory, Cardiovascular, Gastroenterology,Muscularskeletal, Psychiatric were all negative except for pertinent positives noted in my HPI         OBJECTIVE:  /84   Pulse 78   Temp 98.3  F (36.8  C) (Oral)   Resp 18   Wt 82.1 kg (181 lb)   LMP 04/24/2019   SpO2 96%   BMI 30.12 kg/m    GENERAL APPEARANCE: healthy, alert and no distress  EYES: EOMI,  PERRL, conjunctiva clear  HENT: ear canals and TM's normal.  Nose and mouth without ulcers, erythema or lesions  NECK: supple, nontender, no lymphadenopathy  RESP: lungs clear to auscultation - no rales, rhonchi or wheezes  CV: regular rates and rhythm, normal S1 S2, no murmur noted  ABDOMEN:  soft, nontender, no HSM or masses and bowel sounds normal  -Patient self collected her with prep  SKIN: A 2 mm raised tender bump noted in the left side of the neck over the shoulder area with the grande in the middle.  There is no surrounding redness or fluctuation on cellulitis noted.  PSYCH: mentation appears normal  Physical Exam      X-Ray was not done.    Medical Decision Making:    Differential Diagnosis:  Folliculitis   BV /yeast/non specific vaginitis       ASSESSMENT:  Bernarda was seen today for urgent care and derm problem.    Diagnoses and all orders for this visit:    Glands swollen    Vaginal discharge  -     Wet prep    Folliculitis          PLAN:  Tylenol, Fluids and Rest  Reviewed lab results with patient  Advised to  follow-up if symptoms persist  Assured patient that she does not have swollen glands  Discussed about the painful bump being folliculitis  Advised to apply bacitracin to area and look for any worsening of the redness.  See orders in Epic      Darlyn Dawkins MD

## 2019-05-14 ENCOUNTER — TELEPHONE (OUTPATIENT)
Dept: FAMILY MEDICINE | Facility: CLINIC | Age: 49
End: 2019-05-14

## 2019-05-14 ENCOUNTER — OFFICE VISIT (OUTPATIENT)
Dept: FAMILY MEDICINE | Facility: CLINIC | Age: 49
End: 2019-05-14
Payer: MEDICARE

## 2019-05-14 VITALS
SYSTOLIC BLOOD PRESSURE: 125 MMHG | HEIGHT: 65 IN | DIASTOLIC BLOOD PRESSURE: 74 MMHG | TEMPERATURE: 98.4 F | BODY MASS INDEX: 30.49 KG/M2 | HEART RATE: 88 BPM | WEIGHT: 183 LBS | OXYGEN SATURATION: 98 %

## 2019-05-14 DIAGNOSIS — F33.2 SEVERE EPISODE OF RECURRENT MAJOR DEPRESSIVE DISORDER, WITHOUT PSYCHOTIC FEATURES (H): ICD-10-CM

## 2019-05-14 DIAGNOSIS — F31.9 BIPOLAR AFFECTIVE DISORDER, REMISSION STATUS UNSPECIFIED (H): ICD-10-CM

## 2019-05-14 DIAGNOSIS — F41.1 GAD (GENERALIZED ANXIETY DISORDER): ICD-10-CM

## 2019-05-14 DIAGNOSIS — N89.8 VAGINAL DISCHARGE: Primary | ICD-10-CM

## 2019-05-14 DIAGNOSIS — L70.0 ACNE VULGARIS: ICD-10-CM

## 2019-05-14 DIAGNOSIS — M54.41 CHRONIC RIGHT-SIDED LOW BACK PAIN WITH RIGHT-SIDED SCIATICA: ICD-10-CM

## 2019-05-14 DIAGNOSIS — B96.89 BACTERIAL VAGINOSIS: ICD-10-CM

## 2019-05-14 DIAGNOSIS — Z30.015 ENCOUNTER FOR INITIAL PRESCRIPTION OF VAGINAL RING HORMONAL CONTRACEPTIVE: ICD-10-CM

## 2019-05-14 DIAGNOSIS — N76.0 BACTERIAL VAGINOSIS: ICD-10-CM

## 2019-05-14 DIAGNOSIS — G89.29 CHRONIC RIGHT-SIDED LOW BACK PAIN WITH RIGHT-SIDED SCIATICA: ICD-10-CM

## 2019-05-14 LAB
ALBUMIN UR-MCNC: NEGATIVE MG/DL
APPEARANCE UR: CLEAR
BACTERIA #/AREA URNS HPF: ABNORMAL /HPF
BILIRUB UR QL STRIP: NEGATIVE
COLOR UR AUTO: YELLOW
GLUCOSE UR STRIP-MCNC: NEGATIVE MG/DL
HGB UR QL STRIP: ABNORMAL
KETONES UR STRIP-MCNC: NEGATIVE MG/DL
LEUKOCYTE ESTERASE UR QL STRIP: NEGATIVE
MUCOUS THREADS #/AREA URNS LPF: PRESENT /LPF
NITRATE UR QL: NEGATIVE
NON-SQ EPI CELLS #/AREA URNS LPF: ABNORMAL /LPF
PH UR STRIP: 5.5 PH (ref 5–7)
RBC #/AREA URNS AUTO: ABNORMAL /HPF
SOURCE: ABNORMAL
SP GR UR STRIP: 1.01 (ref 1–1.03)
SPECIMEN SOURCE: ABNORMAL
UROBILINOGEN UR STRIP-ACNC: 0.2 EU/DL (ref 0.2–1)
WBC #/AREA URNS AUTO: ABNORMAL /HPF
WET PREP SPEC: ABNORMAL

## 2019-05-14 PROCEDURE — 81001 URINALYSIS AUTO W/SCOPE: CPT | Performed by: PHYSICIAN ASSISTANT

## 2019-05-14 PROCEDURE — 99214 OFFICE O/P EST MOD 30 MIN: CPT | Performed by: PHYSICIAN ASSISTANT

## 2019-05-14 PROCEDURE — 87210 SMEAR WET MOUNT SALINE/INK: CPT | Performed by: PHYSICIAN ASSISTANT

## 2019-05-14 RX ORDER — TRETINOIN 0.25 MG/G
CREAM TOPICAL
Qty: 45 G | Refills: 11 | Status: SHIPPED | OUTPATIENT
Start: 2019-05-14 | End: 2021-01-22

## 2019-05-14 RX ORDER — ETONOGESTREL AND ETHINYL ESTRADIOL VAGINAL RING .015; .12 MG/D; MG/D
RING VAGINAL
Qty: 3 EACH | Refills: 1 | Status: SHIPPED | OUTPATIENT
Start: 2019-05-14 | End: 2021-01-22

## 2019-05-14 RX ORDER — METRONIDAZOLE 7.5 MG/G
1 GEL VAGINAL DAILY
Qty: 70 G | Refills: 0 | Status: SHIPPED | OUTPATIENT
Start: 2019-05-14 | End: 2019-09-13

## 2019-05-14 RX ORDER — IBUPROFEN 800 MG/1
800 TABLET, FILM COATED ORAL EVERY 8 HOURS PRN
Qty: 60 TABLET | Refills: 0 | Status: SHIPPED | OUTPATIENT
Start: 2019-05-14 | End: 2019-12-27

## 2019-05-14 ASSESSMENT — PATIENT HEALTH QUESTIONNAIRE - PHQ9
5. POOR APPETITE OR OVEREATING: NEARLY EVERY DAY
SUM OF ALL RESPONSES TO PHQ QUESTIONS 1-9: 27

## 2019-05-14 ASSESSMENT — ANXIETY QUESTIONNAIRES
1. FEELING NERVOUS, ANXIOUS, OR ON EDGE: NEARLY EVERY DAY
3. WORRYING TOO MUCH ABOUT DIFFERENT THINGS: NEARLY EVERY DAY
GAD7 TOTAL SCORE: 21
2. NOT BEING ABLE TO STOP OR CONTROL WORRYING: NEARLY EVERY DAY
IF YOU CHECKED OFF ANY PROBLEMS ON THIS QUESTIONNAIRE, HOW DIFFICULT HAVE THESE PROBLEMS MADE IT FOR YOU TO DO YOUR WORK, TAKE CARE OF THINGS AT HOME, OR GET ALONG WITH OTHER PEOPLE: EXTREMELY DIFFICULT
6. BECOMING EASILY ANNOYED OR IRRITABLE: NEARLY EVERY DAY
5. BEING SO RESTLESS THAT IT IS HARD TO SIT STILL: NEARLY EVERY DAY
7. FEELING AFRAID AS IF SOMETHING AWFUL MIGHT HAPPEN: NEARLY EVERY DAY

## 2019-05-14 ASSESSMENT — MIFFLIN-ST. JEOR: SCORE: 1460.96

## 2019-05-14 NOTE — LETTER
My Depression Action Plan  Name: Bernarda Garrett   Date of Birth 1970  Date: 5/14/2019    My doctor: Tiffanie Shannon   My clinic: [address] [id number] [phone]          GREEN    ZONE   Good Control    What it looks like:     Things are going generally well. You have normal up s and down s. You may even feel depressed from time to time, but bad moods usually last less than a day.   What you need to do:  1. Continue to care for yourself (see self care plan)  2. Check your depression survival kit and update it as needed  3. Follow your physician s recommendations including any medication.  4. Do not stop taking medication unless you consult with your physician first.           YELLOW         ZONE Getting Worse    What it looks like:     Depression is starting to interfere with your life.     It may be hard to get out of bed; you may be starting to isolate yourself from others.    Symptoms of depression are starting to last most all day and this has happened for several days.     You may have suicidal thoughts but they are not constant.   What you need to do:     1. Call your care team, your response to treatment will improve if you keep your care team informed of your progress. Yellow periods are signs an adjustment may need to be made.     2. Continue your self-care, even if you have to fake it!    3. Talk to someone in your support network    4. Open up your depression survival kit           RED    ZONE Medical Alert - Get Help    What it looks like:     Depression is seriously interfering with your life.     You may experience these or other symptoms: You can t get out of bed most days, can t work or engage in other necessary activities, you have trouble taking care of basic hygiene, or basic responsibilities, thoughts of suicide or death that will not go away, self-injurious behavior.     What you need to do:  1. Call your care team  and request a same-day appointment. If they are not available (weekends or after hours) call your local crisis line, emergency room or 911.            Depression Self Care Plan / Survival Kit    Self-Care for Depression  Here s the deal. Your body and mind are really not as separate as most people think.  What you do and think affects how you feel and how you feel influences what you do and think. This means if you do things that people who feel good do, it will help you feel better.  Sometimes this is all it takes.  There is also a place for medication and therapy depending on how severe your depression is, so be sure to consult with your medical provider and/ or Behavioral Health Consultant if your symptoms are worsening or not improving.     In order to better manage my stress, I will:    Exercise  Get some form of exercise, every day. This will help reduce pain and release endorphins, the  feel good  chemicals in your brain. This is almost as good as taking antidepressants!  This is not the same as joining a gym and then never going! (they count on that by the way ) It can be as simple as just going for a walk or doing some gardening, anything that will get you moving.      Hygiene   Maintain good hygiene (Get out of bed in the morning, Make your bed, Brush your teeth, Take a shower, and Get dressed like you were going to work, even if you are unemployed).  If your clothes don't fit try to get ones that do.    Diet  I will strive to eat foods that are good for me, drink plenty of water, and avoid excessive sugar, caffeine, alcohol, and other mood-altering substances.  Some foods that are helpful in depression are: complex carbohydrates, B vitamins, flaxseed, fish or fish oil, fresh fruits and vegetables.    Psychotherapy  I agree to participate in Individual Therapy (if recommended).    Medication  If prescribed medications, I agree to take them.  Missing doses can result in serious side effects.  I understand  that drinking alcohol, or other illicit drug use, may cause potential side effects.  I will not stop my medication abruptly without first discussing it with my provider.    Staying Connected With Others  I will stay in touch with my friends, family members, and my primary care provider/team.    Use your imagination  Be creative.  We all have a creative side; it doesn t matter if it s oil painting, sand castles, or mud pies! This will also kick up the endorphins.    Witness Beauty  (AKA stop and smell the roses) Take a look outside, even in mid-winter. Notice colors, textures. Watch the squirrels and birds.     Service to others  Be of service to others.  There is always someone else in need.  By helping others we can  get out of ourselves  and remember the really important things.  This also provides opportunities for practicing all the other parts of the program.    Humor  Laugh and be silly!  Adjust your TV habits for less news and crime-drama and more comedy.    Control your stress  Try breathing deep, massage therapy, biofeedback, and meditation. Find time to relax each day.     My support system    Clinic Contact:  Phone number:    Contact 1:  Phone number:    Contact 2:  Phone number:    Church/:  Phone number:    Therapist:  Phone number:    Local crisis center:    Phone number:    Other community support:  Phone number:

## 2019-05-14 NOTE — PROGRESS NOTES
"  SUBJECTIVE:                                                    Bernarda Garrett is a 49 year old female who presents to clinic today for the following health issues:    Vaginal Symptoms  Onset: x1.5-2 weeks    Description:  Vaginal Discharge: white creamy   Itching (Pruritis): no   Burning sensation:  no   Odor: YES- fishy    Accompanying Signs & Symptoms:  Pain with Urination: no   Abdominal Pain: YES- possibly from menses  Fever: no     History:   Sexually active: no   New Partner: no   Possibility of Pregnancy:  No    Precipitating factors:   Recent Antibiotic Use: no     Alleviating factors:  soap    Therapies Tried and outcome: nothing      Problem list and histories reviewed & adjusted, as indicated.  Additional history: as documented      ROS:  Constitutional, HEENT, cardiovascular, pulmonary, GI, , musculoskeletal, neuro, skin, endocrine and psych systems are negative, except as otherwise noted.    OBJECTIVE:                                                    /74 (BP Location: Left arm, Patient Position: Chair, Cuff Size: Adult Large)   Pulse 88   Temp 98.4  F (36.9  C) (Oral)   Ht 1.651 m (5' 5\")   Wt 83 kg (183 lb)   LMP 04/24/2019   SpO2 98%   Breastfeeding? No   BMI 30.45 kg/m    Body mass index is 30.45 kg/m .  GENERAL: healthy, alert and no distress  EYES: Eyes grossly normal to inspection, PERRL and conjunctivae and sclerae normal  MS: no gross musculoskeletal defects noted, no edema  NEURO: Normal strength and tone, mentation intact and speech normal  PSYCH: mentation appears normal, affect normal/bright    Diagnostic Test Results:  Results for orders placed or performed in visit on 05/14/19 (from the past 24 hour(s))   *UA reflex to Microscopic and Culture ([place] and [facility] (except [place] and [place])   Result Value Ref Range    Color Urine Yellow     Appearance Urine Clear     Glucose Urine Negative NEG^Negative mg/dL    Bilirubin Urine Negative NEG^Negative    Ketones " Urine Negative NEG^Negative mg/dL    Specific Gravity Urine 1.010 1.003 - 1.035    Blood Urine Small (A) NEG^Negative    pH Urine 5.5 5.0 - 7.0 pH    Protein Albumin Urine Negative NEG^Negative mg/dL    Urobilinogen Urine 0.2 0.2 - 1.0 EU/dL    Nitrite Urine Negative NEG^Negative    Leukocyte Esterase Urine Negative NEG^Negative    Source Midstream Urine    Urine Microscopic   Result Value Ref Range    WBC Urine 0 - 5 OTO5^0 - 5 /HPF    RBC Urine 2-5 (A) OTO2^O - 2 /HPF    Squamous Epithelial /LPF Urine Few FEW^Few /LPF    Bacteria Urine Moderate (A) NEG^Negative /HPF    Mucous Urine Present (A) NEG^Negative /LPF   Wet prep   Result Value Ref Range    Specimen Description Vagina     Wet Prep Clue cells seen (A)     Wet Prep No Trichomonas seen     Wet Prep No yeast seen         ASSESSMENT/PLAN:                                                      Bernarda was seen today for vaginal problem.    Diagnoses and all orders for this visit:    Vaginal discharge  -     *UA reflex to Microscopic and Culture ([place] and [facility] (except [place] and [place])  -     Wet prep  -     Urine Microscopic    Acne vulgaris  -     tretinoin (RETIN-A) 0.025 % external cream; Spread a pea size amount into affected area topically at bedtime.  Use sunscreen SPF>20.    Encounter for initial prescription of vaginal ring hormonal contraceptive  -     etonogestrel-ethinyl estradiol (NUVARING) 0.12-0.015 MG/24HR vaginal ring; Insert 1 ring vaginally every 21 days then remove for 1 week then repeat with new ring.    Bacterial vaginosis  -     metroNIDAZOLE (METROGEL) 0.75 % vaginal gel; Place 1 applicator (5 g) vaginally daily for 5 days    Bipolar affective disorder, remission status unspecified (H)  -     MENTAL HEALTH REFERRAL  - Adult; Psychiatry and Medication Management; Psychiatry; [facility]: [facility] Psychiatry Service [phone].  Medication management & future refills will be returned to G PCP upon completion of  evaluation; We [address]...    KUSH (generalized anxiety disorder)  -     MENTAL HEALTH REFERRAL  - Adult; Psychiatry and Medication Management; Psychiatry; [facility]: [facility] Psychiatry Service [phone].  Medication management & future refills will be returned to [facility] PCP upon completion of evaluation; We lane...    Severe episode of recurrent major depressive disorder, without psychotic features (H)  -     MENTAL HEALTH REFERRAL  - Adult; Psychiatry and Medication Management; Psychiatry; [facility]: [facility] Psychiatry Service [phone].  Medication management & future refills will be returned to [facility] PCP upon completion of evaluation; We [address]...    Chronic right-sided low back pain with right-sided sciatica  -     ibuprofen (ADVIL/MOTRIN) 800 MG tablet; Take 1 tablet (800 mg) by mouth every 8 hours as needed for moderate pain      Though patient did not present to the clinic today for concerns of mental health issues, it was seen today that her PHQ9 and GAD7 are markedly elevated, as they have been at many of her office visits.  She states that she is stable and she is not here to address this today.  She has two of her children in clinic with her today, but she states that she can return to clinic without her children to discuss these issues at a different office visit.  She has had passive thoughts of self harm for many years, but contracts that she would never do that as her kids depend on her and she wants to be around for them.  She has been on Klonopin in the past for anxiety and says it worked well.  Due to her complexity and mixed picture including bipolar affective disorder, I have advised that the patient followup with psychiatry care for further assessment and medication management.  Patient reports that she is stable and safe to wait for followup.  She does contract for safety today and denies having thoughts or intentions of hurting herself or others.      Followup: Return in about 1 week  (around 5/21/2019) for Specialty followup, please be seen sooner if needed.    -- I have discussed the patient's diagnosis, and my plan of treatment with the patient and/or family. Patient is aware to followup if symptoms do not improve.  Patient has been advised to be seen sooner or seek more immediate care if symptoms change or worsen.  Patient agrees with and understands the plan today.     See Patient Instructions        Tiffanie Shannon PA-C    [facility] [place]

## 2019-05-15 ASSESSMENT — ANXIETY QUESTIONNAIRES: GAD7 TOTAL SCORE: 21

## 2019-05-17 NOTE — TELEPHONE ENCOUNTER
Prior Authorization Approval    Authorization Effective Date: 2/16/2019  Authorization Expiration Date: 5/16/2020  Medication: Trentinoin Cream 0.025%- APPROVED   Approved Dose/Quantity:   Reference #:     Insurance Company: [employer] - Phone [phone] Fax [phone]  Expected CoPay:       CoPay Card Available:      Foundation Assistance Needed:    Which Pharmacy is filling the prescription (Not needed for infusion/clinic administered): [facility] [address] [place], [place] - [address]  Pharmacy Notified: Yes  Patient Notified: Comment:  **Instructed pharmacy to notify patient when script is ready to /ship.**

## 2019-05-17 NOTE — TELEPHONE ENCOUNTER
Central Prior Authorization Team  Phone: [phone]    PA Initiation    Medication: Trentinoin Cream 0.025%  Insurance Company: [employer] Phone [phone] Fax [phone]  Pharmacy Filling the Rx: [facility] [place]  [place], [place] - [address]  Filling Pharmacy Phone: [phone]  Filling Pharmacy Fax:    Start Date: 5/17/2019

## 2019-05-29 ENCOUNTER — HOSPITAL ENCOUNTER (EMERGENCY)
Facility: CLINIC | Age: 49
Discharge: HOME | End: 2019-05-29
Attending: EMERGENCY MEDICINE | Admitting: EMERGENCY MEDICINE
Payer: MEDICARE

## 2019-05-29 VITALS
SYSTOLIC BLOOD PRESSURE: 141 MMHG | DIASTOLIC BLOOD PRESSURE: 98 MMHG | RESPIRATION RATE: 16 BRPM | OXYGEN SATURATION: 99 % | BODY MASS INDEX: 28.66 KG/M2 | TEMPERATURE: 97.1 F | WEIGHT: 172 LBS | HEIGHT: 65 IN | HEART RATE: 63 BPM

## 2019-05-29 DIAGNOSIS — R42 LIGHTHEADEDNESS: ICD-10-CM

## 2019-05-29 LAB
ANION GAP SERPL CALCULATED.3IONS-SCNC: 3 MMOL/L (ref 3–14)
BASOPHILS # BLD AUTO: 0 10E9/L (ref 0–0.2)
BASOPHILS NFR BLD AUTO: 0.7 %
BUN SERPL-MCNC: 16 MG/DL (ref 7–30)
CALCIUM SERPL-MCNC: 8.4 MG/DL (ref 8.5–10.1)
CHLORIDE SERPL-SCNC: 109 MMOL/L (ref 94–109)
CO2 SERPL-SCNC: 27 MMOL/L (ref 20–32)
CREAT SERPL-MCNC: 0.97 MG/DL (ref 0.52–1.04)
DIFFERENTIAL METHOD BLD: ABNORMAL
EOSINOPHIL # BLD AUTO: 0 10E9/L (ref 0–0.7)
EOSINOPHIL NFR BLD AUTO: 0.6 %
ERYTHROCYTE [DISTWIDTH] IN BLOOD BY AUTOMATED COUNT: 14.2 % (ref 10–15)
GFR SERPL CREATININE-BSD FRML MDRD: 69 ML/MIN/{1.73_M2}
GLUCOSE SERPL-MCNC: 99 MG/DL (ref 70–99)
HCT VFR BLD AUTO: 39.7 % (ref 35–47)
HGB BLD-MCNC: 12.7 G/DL (ref 11.7–15.7)
IMM GRANULOCYTES # BLD: 0 10E9/L (ref 0–0.4)
IMM GRANULOCYTES NFR BLD: 0.2 %
LYMPHOCYTES # BLD AUTO: 1.6 10E9/L (ref 0.8–5.3)
LYMPHOCYTES NFR BLD AUTO: 30.4 %
MCH RBC QN AUTO: 25.6 PG (ref 26.5–33)
MCHC RBC AUTO-ENTMCNC: 32 G/DL (ref 31.5–36.5)
MCV RBC AUTO: 80 FL (ref 78–100)
MONOCYTES # BLD AUTO: 0.4 10E9/L (ref 0–1.3)
MONOCYTES NFR BLD AUTO: 7.4 %
NEUTROPHILS # BLD AUTO: 3.3 10E9/L (ref 1.6–8.3)
NEUTROPHILS NFR BLD AUTO: 60.7 %
NRBC # BLD AUTO: 0 10*3/UL
NRBC BLD AUTO-RTO: 0 /100
PLATELET # BLD AUTO: 307 10E9/L (ref 150–450)
POTASSIUM SERPL-SCNC: 3.8 MMOL/L (ref 3.4–5.3)
RBC # BLD AUTO: 4.97 10E12/L (ref 3.8–5.2)
SODIUM SERPL-SCNC: 139 MMOL/L (ref 133–144)
TROPONIN I SERPL-MCNC: <0.015 UG/L (ref 0–0.04)
WBC # BLD AUTO: 5.4 10E9/L (ref 4–11)

## 2019-05-29 PROCEDURE — 85025 COMPLETE CBC W/AUTO DIFF WBC: CPT | Performed by: EMERGENCY MEDICINE

## 2019-05-29 PROCEDURE — 84484 ASSAY OF TROPONIN QUANT: CPT | Performed by: EMERGENCY MEDICINE

## 2019-05-29 PROCEDURE — 80048 BASIC METABOLIC PNL TOTAL CA: CPT | Performed by: EMERGENCY MEDICINE

## 2019-05-29 PROCEDURE — 99284 EMERGENCY DEPT VISIT MOD MDM: CPT

## 2019-05-29 PROCEDURE — 93005 ELECTROCARDIOGRAM TRACING: CPT

## 2019-05-29 RX ORDER — LORAZEPAM 0.5 MG/1
0.5 TABLET ORAL EVERY 8 HOURS PRN
Qty: 10 TABLET | Refills: 0 | Status: SHIPPED | OUTPATIENT
Start: 2019-05-29 | End: 2019-09-13

## 2019-05-29 ASSESSMENT — ENCOUNTER SYMPTOMS
DIARRHEA: 0
NAUSEA: 0
VOMITING: 0
NUMBNESS: 0
FEVER: 0
LIGHT-HEADEDNESS: 1

## 2019-05-29 ASSESSMENT — MIFFLIN-ST. JEOR: SCORE: 1411.07

## 2019-05-29 NOTE — ED AVS SNAPSHOT
[facility] Emergency Department  [address]  [facility] [address]  Phone:  [phone]  Fax:  [phone]                                    Bernarda Garrett   MRN: [id number]    Department:  [facility] Emergency Department   Date of Visit:  5/29/2019           After Visit Summary Signature Page    I have received my discharge instructions, and my questions have been answered. I have discussed any challenges I see with this plan with the nurse or doctor.    ..........................................................................................................................................  Patient/Patient Representative Signature      ..........................................................................................................................................  Patient Representative Print Name and Relationship to Patient    ..................................................               ................................................  Date                                   Time    ..........................................................................................................................................  Reviewed by Signature/Title    ...................................................              ..............................................  Date                                               Time          22EPIC Rev 08/18

## 2019-05-29 NOTE — ED NOTES
Pt resting Comfortably on bed.  Alert & Oriented  Pt denies any dizziness, pain or other problems.  EKG: NSR without ectopy

## 2019-05-29 NOTE — ED PROVIDER NOTES
"  History     Chief Complaint:  Dizziness    History was provided by the patient.      Bernarda Garrett is a 49 year old female with a history of hypertension who presents with lightheadedness. The patient reports she has been feeling lightheaded since late on 5/28/19, just under 24 hours ago. She states that she feels the most lightheaded when she stands up. She reports a tingling throughout her entire body and a discomfort in her abdomen accompanying the lightheadedness. The patient notes feeling sluggish today as well, so she decided to come to the ED out of concern for all these new symptoms. She denies nausea, vomiting, diarrhea, or exposure to illness recently. She notes that she has had panic attacks before, but her symptoms today aren't similar to her previous panic attacks.    Allergies:  Dilaudid  Minocycline  Latex  Guaifenesin and Derivatives    Medications:    Epipen  Nuvaring  Claritin  Albuterol inhaler     Past Medical History:    Peanut Anaphylaxis   Trace mitral valve regurgitation  Bipolar Affective Disorder  Heart Murmur  Hypertension - not on medications    Past Surgical History:    [place] Teeth Extraction  Dilation and Curettage    Family History:    Alcohol/Drug Use  Sickle Cell Trait  Asthma  Heart Disease  Hyperlipidemia  Hypertension    Social History:  Smoking status: No  Alcohol use: No  Drug use: No  PCP: Tiffanie Shannon  Marital Status:  [4]     Review of Systems   Constitutional: Negative for fever.   Gastrointestinal: Negative for diarrhea, nausea and vomiting.   Neurological: Positive for light-headedness. Negative for numbness.   All other systems reviewed and are negative.    Physical Exam     Patient Vitals for the past 24 hrs:   BP Temp Temp src Pulse Heart Rate Resp SpO2 Height Weight   05/29/19 1700 (!) 141/98 -- -- 63 68 -- 99 % -- --   05/29/19 1630 -- -- -- -- 72 -- -- -- --   05/29/19 1601 157/90 97.1  F (36.2  C) Temporal 74 -- 16 99 % 1.651 m (5' 5\") 78 kg (172 lb) "     Physical Exam  Constitutional: Vital signs reviewed as above.   Head: No external signs of trauma noted.  Eyes: Pupils are equal, round, and reactive to light.   Neck: No JVD noted  Cardiovascular: Normal rate, regular rhythm and normal heart sounds.  No murmur heard. Equal B/L peripheral pulses.  Pulmonary/Chest: Effort normal and breath sounds normal. No respiratory distress. Patient has no wheezes. Patient has no rales.   Gastrointestinal: Soft. There is no tenderness.   Musculoskeletal/Extremities: No edema noted. Normal tone.  Neurological: Patient is alert and oriented to person, place, and time.   Skin: Skin is warm and dry. There is no diaphoresis noted.   Psychiatric: The patient appears calm.    Emergency Department Course   ECG (17:33:04):  Rate 63 bpm. UT interval 144. QRS duration 84. QT/QTc 432/442. P-R-T axes 33 -14 41. Normal sinus rhythm with sinus arrhythmia. T wave abnormality, consider anterior ischemia. Abnormal ECG. No significant change compared to EKG dated 1/21/2019. Interpreted at 1801 by Willis Brannon DO.    Laboratory:  CBC: WNL (WBC 5.4, HGB 14.7, )  BMP: Calcium 8.4 (L) o/w WNL (Creatinine 0.97)  Troponin (1748): <0.015    Emergency Department Course:  Past medical records, nursing notes, and vitals reviewed.  1717: I performed an exam of the patient and obtained history, as documented above.   EKG performed, results above  IV inserted and blood drawn.    ED Course as of May 29 1908   Wed May 29, 2019   1904 Rechecked patient.  I talked with her about her positive PSS 3 screening.  She notes that she has felt depressed in the past but does not have any suicidal ideations right now.  She states she does have supportive family and does not feel that there is any abusive concerns she has at home.  She does not feel concern for her living situation or safety at this time.        1904: Findings and plan explained to the Patient. Patient discharged home with instructions  regarding supportive care, medications, and reasons to return. The importance of close follow-up was reviewed.     Impression & Plan    Medical Decision Making:  This 49-year-old male patient presents the ED due to lightheadedness.  Please see the HPI and exam for specifics.  Patient remains well in the ED.  EKG and laboratory studies appear normal.  As the patient feels well I believe she can be discharged.  I will encourage outpatient primary care follow-up.  Patient may return to the ED at any time should she feel worse.  Anticipatory guidance given prior to discharge.    Diagnosis:    ICD-10-CM   1. Lightheadedness R42     Disposition: Discharged to home    Discharge Medications:  LORazepam 0.5 MG tablet  Commonly known as:  ATIVAN  0.5 mg, Oral, EVERY 8 HOURS PRN       Brent Moran  5/29/2019   [facility] EMERGENCY DEPARTMENT  Brent THOMSON, am serving as a scribe at 5:17 PM on 5/29/2019 to document services personally performed by Willis Brannon DO based on my observations and the provider's statements to me.        Willis Brannon DO  05/29/19 0220

## 2019-05-30 LAB — INTERPRETATION ECG - MUSE: NORMAL

## 2019-07-10 ENCOUNTER — OFFICE VISIT (OUTPATIENT)
Dept: FAMILY MEDICINE | Facility: CLINIC | Age: 49
End: 2019-07-10
Payer: MEDICARE

## 2019-07-10 VITALS
OXYGEN SATURATION: 99 % | BODY MASS INDEX: 29.16 KG/M2 | SYSTOLIC BLOOD PRESSURE: 122 MMHG | HEART RATE: 72 BPM | WEIGHT: 175 LBS | HEIGHT: 65 IN | TEMPERATURE: 97.2 F | DIASTOLIC BLOOD PRESSURE: 78 MMHG

## 2019-07-10 DIAGNOSIS — J30.2 SEASONAL ALLERGIC RHINITIS, UNSPECIFIED TRIGGER: ICD-10-CM

## 2019-07-10 DIAGNOSIS — F31.9 BIPOLAR AFFECTIVE DISORDER, REMISSION STATUS UNSPECIFIED (H): ICD-10-CM

## 2019-07-10 DIAGNOSIS — R05.9 COUGH: Primary | ICD-10-CM

## 2019-07-10 DIAGNOSIS — E55.9 VITAMIN D DEFICIENCY: ICD-10-CM

## 2019-07-10 PROCEDURE — 99214 OFFICE O/P EST MOD 30 MIN: CPT | Performed by: PHYSICIAN ASSISTANT

## 2019-07-10 RX ORDER — ALBUTEROL SULFATE 90 UG/1
2 AEROSOL, METERED RESPIRATORY (INHALATION) EVERY 4 HOURS PRN
Qty: 1 INHALER | Refills: 0 | Status: SHIPPED | OUTPATIENT
Start: 2019-07-10

## 2019-07-10 RX ORDER — LORATADINE 10 MG/1
10 TABLET ORAL DAILY
Qty: 30 TABLET | Refills: 1 | Status: SHIPPED | OUTPATIENT
Start: 2019-07-10 | End: 2019-07-10

## 2019-07-10 RX ORDER — LORATADINE 10 MG/1
10 TABLET ORAL DAILY
Qty: 90 TABLET | Refills: 3 | Status: SHIPPED | OUTPATIENT
Start: 2019-07-10

## 2019-07-10 ASSESSMENT — ANXIETY QUESTIONNAIRES
6. BECOMING EASILY ANNOYED OR IRRITABLE: NEARLY EVERY DAY
7. FEELING AFRAID AS IF SOMETHING AWFUL MIGHT HAPPEN: NEARLY EVERY DAY
3. WORRYING TOO MUCH ABOUT DIFFERENT THINGS: NEARLY EVERY DAY
IF YOU CHECKED OFF ANY PROBLEMS ON THIS QUESTIONNAIRE, HOW DIFFICULT HAVE THESE PROBLEMS MADE IT FOR YOU TO DO YOUR WORK, TAKE CARE OF THINGS AT HOME, OR GET ALONG WITH OTHER PEOPLE: EXTREMELY DIFFICULT
2. NOT BEING ABLE TO STOP OR CONTROL WORRYING: NEARLY EVERY DAY
5. BEING SO RESTLESS THAT IT IS HARD TO SIT STILL: MORE THAN HALF THE DAYS
1. FEELING NERVOUS, ANXIOUS, OR ON EDGE: NEARLY EVERY DAY
GAD7 TOTAL SCORE: 19

## 2019-07-10 ASSESSMENT — MIFFLIN-ST. JEOR: SCORE: 1424.67

## 2019-07-10 ASSESSMENT — PATIENT HEALTH QUESTIONNAIRE - PHQ9
5. POOR APPETITE OR OVEREATING: MORE THAN HALF THE DAYS
SUM OF ALL RESPONSES TO PHQ QUESTIONS 1-9: 24

## 2019-07-10 NOTE — PROGRESS NOTES
"Subjective     Bernarda Garrett is a 49 year old female who presents to clinic today for the following health issues:    HPI   Acute Illness   Acute illness concerns: cough, congestion, watery eyes  Here today with her 2 autistic sons with similar symptoms.  She is worried they have mold in their house - reports they had baseboards removed in 2017, but they still get quite a lot of moisture and sometimes the carpet still seems wet.   No hx of asthma, but hx of bronchitis. No recent antibiotics.  No smokers in the home.    Onset: 2 weeks   Reports she has had several sinus infections this year. Denies any current sinus symptoms though. No face pain or teeth pain.       Fever: no    Chills/Sweats: no    Headache (location?): no    Sinus Pressure:no    Conjunctivitis:  no    Ear Pain: no    Rhinorrhea: YES    Congestion: YES    Sore Throat: no     Cough: yes - \"low grade\". Mildly productive of phlegm.     Wheeze: yes - and chest hurts when she feels wheezy. Mentions she has a hx of costocondritis, but this is a different type of pain. Describes as a burning sensation. Reports feels it's burn when she tries to breathe.       Decreased Appetite: no    Nausea: no    Vomiting: no    Diarrhea:  no    Dysuria/Freq.: no    Fatigue/Achiness: no    Sick/Strep Exposure: no     Therapies Tried and outcome: tylenol, claritin - this does help with her respiratory symptoms, but not her chest pain.   Requesting refill of claritin - takes for allergies    2) Requesting additional refills. Previously followed by SAUMYA Shannon in [facility].   -Ativan; reports she was given this for anxiety. Also endorses DX of bipolar and agoraphobia. Sees a therapist every week, but does not have a psychiatrist. Believes she has also been treated with klonopin in the past. Believes she was on zoloft which worked for awhile, but then felt like her body got used to it and they'd have to start something else. We reviewed risks of benzodiazepines and how I do " "not use this as a long-term treatment and pt states she was not aware of the risks affiliated with it.   Reports her PHQ/KUSH scores are high and they are \"never going to change.\" She answered positively to passive suicidality, but denies she has any active thoughts or plan. Denies that she is a current risk to herself or others. I am able to verbally contract for safety.   -Vitamin D; has a needle phobia so declines labs, but would just like to have refilled. Was told this would also be beneficial for her mood as well so would like to continue it.      Patient Active Problem List   Diagnosis     Bipolar affective disorder (H)     Heart murmur     Acne     Mitral valve disorder     Vaginal discharge     History reviewed. No pertinent surgical history.    Social History     Tobacco Use     Smoking status: Never Smoker     Smokeless tobacco: Never Used   Substance Use Topics     Alcohol use: No     History reviewed. No pertinent family history.      Current Outpatient Medications   Medication Sig Dispense Refill     etonogestrel-ethinyl estradiol (NUVARING) 0.12-0.015 MG/24HR vaginal ring Insert 1 ring vaginally every 21 days then remove for 1 week then repeat with new ring. 3 each 1     ibuprofen (ADVIL/MOTRIN) 800 MG tablet Take 1 tablet (800 mg) by mouth every 8 hours as needed for moderate pain 60 tablet 0     loratadine (CLARITIN) 10 MG tablet Take 1 tablet (10 mg) by mouth daily 30 tablet 1     LORazepam (ATIVAN) 0.5 MG tablet Take 1 tablet (0.5 mg) by mouth every 8 hours as needed for anxiety 10 tablet 0     EPINEPHrine (EPIPEN/ADRENACLICK/OR ANY BX GENERIC EQUIV) 0.3 MG/0.3ML injection 2-pack Inject 0.3 mLs (0.3 mg) into the muscle as needed for anaphylaxis (Patient not taking: Reported on 7/10/2019) 0.6 mL 1     order for DME Equipment being ordered: Shower chair with back, 400lb weight capacity 1 Units 0     tretinoin (RETIN-A) 0.025 % external cream Spread a pea size amount into affected area topically at " "bedtime.  Use sunscreen SPF>20. 45 g 11     Allergies   Allergen Reactions     Peanuts [Nuts] Anaphylaxis     Dilaudid [Hydromorphone] Itching     Minocycline Itching     Latex Rash       Reviewed and updated as needed this visit by Provider  Tobacco  Allergies  Meds  Problems  Med Hx  Surg Hx  Fam Hx  Soc Hx          Review of Systems   ROS COMP: Constitutional, HEENT, cardiovascular, pulmonary, gi and gu, MSK systems are negative, except as otherwise noted.      Objective    /78 (BP Location: Right arm, Cuff Size: Adult Regular)   Pulse 72   Temp 97.2  F (36.2  C) (Tympanic)   Ht 1.651 m (5' 5\")   Wt 79.4 kg (175 lb)   SpO2 99%   BMI 29.12 kg/m    Body mass index is 29.12 kg/m .  Physical Exam   GENERAL: healthy, alert and no distress  EYES: Eyes grossly normal to inspection, PERRL and conjunctivae and sclerae normal  HENT: ear canals and TM's normal, nose and mouth without ulcers or lesions  NECK: no adenopathy and no asymmetry, masses, or scars  RESP: lungs clear to auscultation - no rales, rhonchi or wheezes  CV: regular rates and rhythm and no murmur, click or rub  PSYCH: affect somewhat flat at times, but otherwise normal. Normal speech and mentation.    Diagnostic Test Results:  Labs reviewed in Epic  See flowsheets for PHQ/KUSH scores.        Assessment & Plan       ICD-10-CM    1. Cough R05 albuterol (PROAIR HFA/PROVENTIL HFA/VENTOLIN HFA) 108 (90 Base) MCG/ACT inhaler   2. Seasonal allergic rhinitis, unspecified trigger J30.2 loratadine (CLARITIN) 10 MG tablet     DISCONTINUED: loratadine (CLARITIN) 10 MG tablet   3. Bipolar affective disorder, remission status unspecified (H) F31.9 vitamin D3 (CHOLECALCIFEROL) 1000 units (25 mcg) tablet     sertraline (ZOLOFT) 50 MG tablet     MENTAL HEALTH REFERRAL  - Adult; Psychiatry and Medication Management; Psychiatry; Other: [facility] [phone]; We will contact you to schedule the appointment or please call with any " questions   4. Vitamin D deficiency E55.9 vitamin D3 (CHOLECALCIFEROL) 1000 units (25 mcg) tablet   Pt reports she has mold in her home and is concern this is contributing to breathing issues for herself and her kids. Reassuringly her O2 is normal, she is afebrile and breath sounds are clear on exam. It's unclear if she has asthmatic component that may be contributing as does suffer with underlying allergies so advised to give trial to albuterol and continue her antihistamine.  She also wished to review her meds today so vitamin D was refilled and we also discussed resuming zoloft as she has taken in past for bipolar and done well with it. Given severity of PHQ/KUSH scores and she reports no improvement with primary care management over the past few years we also discussed having her see psychiatry and she is amenable to this. Suspect history is complicated by caring for two autistic children as well and I encouraged ongoing care with her therapist weekly as she has been. Risks reviewed of benzodiazepines as well and let her know that I do not prescribes long-term. Would defer ongoing medication management to psych and they can consider this if indicated.  See Patient Instructions  Patient in agreement with plan.     Patient Instructions   May have an asthmatic component contributing to your cough.  Ok to trial use of albuterol inhaler and continue to take claritin for allergies.  Also discussed mood history today.  Resume zoloft as you've done well with this in the past.  Benzodiazepines are not a chronic medication.  Given you report no improvement in primary care in the past, would also advise ongoing care with psychiatry.       Return in about 6 weeks (around 8/21/2019) for recheck mood .    Shilpa Hancock PA-C  [facility]

## 2019-07-10 NOTE — PATIENT INSTRUCTIONS
May have an asthmatic component contributing to your cough.  Ok to trial use of albuterol inhaler and continue to take claritin for allergies.  Also discussed mood history today.  Resume zoloft as you've done well with this in the past.  Benzodiazepines are not a chronic medication.  Given you report no improvement in primary care in the past, would also advise ongoing care with psychiatry.

## 2019-07-11 ASSESSMENT — ANXIETY QUESTIONNAIRES: GAD7 TOTAL SCORE: 19

## 2019-07-30 ENCOUNTER — TELEPHONE (OUTPATIENT)
Dept: FAMILY MEDICINE | Facility: CLINIC | Age: 49
End: 2019-07-30

## 2019-07-30 ENCOUNTER — OFFICE VISIT (OUTPATIENT)
Dept: FAMILY MEDICINE | Facility: CLINIC | Age: 49
End: 2019-07-30
Payer: MEDICARE

## 2019-07-30 VITALS
BODY MASS INDEX: 28.77 KG/M2 | HEIGHT: 66 IN | HEART RATE: 74 BPM | SYSTOLIC BLOOD PRESSURE: 118 MMHG | RESPIRATION RATE: 16 BRPM | OXYGEN SATURATION: 98 % | DIASTOLIC BLOOD PRESSURE: 86 MMHG | TEMPERATURE: 97.8 F | WEIGHT: 179 LBS

## 2019-07-30 DIAGNOSIS — J30.2 SEASONAL ALLERGIC RHINITIS, UNSPECIFIED TRIGGER: Primary | ICD-10-CM

## 2019-07-30 PROCEDURE — 99213 OFFICE O/P EST LOW 20 MIN: CPT | Performed by: PHYSICIAN ASSISTANT

## 2019-07-30 RX ORDER — MONTELUKAST SODIUM 10 MG/1
10 TABLET ORAL AT BEDTIME
Qty: 30 TABLET | Refills: 1 | Status: SHIPPED | OUTPATIENT
Start: 2019-07-30 | End: 2020-03-19

## 2019-07-30 ASSESSMENT — MIFFLIN-ST. JEOR: SCORE: 1450.75

## 2019-07-30 NOTE — TELEPHONE ENCOUNTER
Reason for call:  Patient reporting a symptom    Symptom or request: pt called in and stated her and her 2 kids are having breathing issues, sinus problems, may have mold in the house    Duration (how long have symptoms been present):     Have you been treated for this before? No    Additional comments: Please call pt to triage her and 2 children, sent encounters for those as well    Phone Number patient can be reached at:  Home number on file [phone] (home)    Best Time:      Can we leave a detailed message on this number:  YES    Call taken on 7/30/2019 at 8:20 AM by Rody Hercules

## 2019-07-30 NOTE — PROGRESS NOTES
Tc Garrett is a 49 year old female who presents to clinic today for the following health issues:    VENECIA Menchaca presents to the clinic for reevaluation of her cough, itchy yes, and nasal congestion.  She was seen on 07/10/2019 for the same symptoms. At that time, she was advised to use antihistamine and start trial of albuterol.  She says that albuterol has helped somewhat, but she continues to have the mild cough and continues to suffer with uncomfortable allergy symptoms.  She is very concerned that there may be mold rowing in her home that is causing a worsening of her allergies over the past several months.         Patient Active Problem List   Diagnosis     Bipolar affective disorder (H)     Heart murmur     Acne     Mitral valve disorder     Vaginal discharge     No past surgical history on file.    Social History     Tobacco Use     Smoking status: Never Smoker     Smokeless tobacco: Never Used   Substance Use Topics     Alcohol use: No     No family history on file.      Current Outpatient Medications   Medication Sig Dispense Refill     montelukast (SINGULAIR) 10 MG tablet Take 1 tablet (10 mg) by mouth At Bedtime 30 tablet 1     albuterol (PROAIR HFA/PROVENTIL HFA/VENTOLIN HFA) 108 (90 Base) MCG/ACT inhaler Inhale 2 puffs into the lungs every 4 hours as needed for other (cough) 1 Inhaler 0     EPINEPHrine (EPIPEN/ADRENACLICK/OR ANY BX GENERIC EQUIV) 0.3 MG/0.3ML injection 2-pack Inject 0.3 mLs (0.3 mg) into the muscle as needed for anaphylaxis (Patient not taking: Reported on 7/10/2019) 0.6 mL 1     etonogestrel-ethinyl estradiol (NUVARING) 0.12-0.015 MG/24HR vaginal ring Insert 1 ring vaginally every 21 days then remove for 1 week then repeat with new ring. 3 each 1     ibuprofen (ADVIL/MOTRIN) 800 MG tablet Take 1 tablet (800 mg) by mouth every 8 hours as needed for moderate pain 60 tablet 0     loratadine (CLARITIN) 10 MG tablet Take 1 tablet (10 mg) by mouth daily 90 tablet 3      "LORazepam (ATIVAN) 0.5 MG tablet Take 1 tablet (0.5 mg) by mouth every 8 hours as needed for anxiety 10 tablet 0     order for DME Equipment being ordered: Shower chair with back, 400lb weight capacity 1 Units 0     sertraline (ZOLOFT) 50 MG tablet Take 1/2 tab daily for 1-2 weeks then increase to 1 tab daily thereafter. 90 tablet 0     tretinoin (RETIN-A) 0.025 % external cream Spread a pea size amount into affected area topically at bedtime.  Use sunscreen SPF>20. 45 g 11     vitamin D3 (CHOLECALCIFEROL) 1000 units (25 mcg) tablet Take 1000 units during the summer months, but increase to 2000 units during winter months. 100 tablet 3     Allergies   Allergen Reactions     Peanuts [Nuts] Anaphylaxis     Dilaudid [Hydromorphone] Itching     Minocycline Itching     Latex Rash         Reviewed and updated as needed this visit by Provider         Review of Systems   ROS COMP: Constitutional, HEENT, cardiovascular, pulmonary, gi and gu systems are negative, except as otherwise noted.      Objective    /86   Pulse 74   Temp 97.8  F (36.6  C) (Tympanic)   Resp 16   Ht 1.664 m (5' 5.5\")   Wt 81.2 kg (179 lb)   LMP 07/03/2019   SpO2 98%   BMI 29.33 kg/m    Body mass index is 29.33 kg/m .  Physical Exam   GENERAL: healthy, alert and no distress  EYES: Eyes grossly normal to inspection, PERRL and conjunctivae and sclerae normal  HENT: ear canals and TM's normal, nose and mouth without ulcers or lesions  NECK: no adenopathy, no asymmetry, masses, or scars and thyroid normal to palpation  RESP: lungs clear to auscultation - no rales, rhonchi or wheezes  CV: regular rate and rhythm, normal S1 S2, no S3 or S4, no murmur, click or rub    Diagnostic Test Results:  none         Assessment & Plan     1. Seasonal allergic rhinitis, unspecified trigger  Symptoms continue to be consistent with seasonal allergies.  Her examination is unremarkable at this time.  I have advised that she continue with her current regimen and " "may add Singulair for her persistent symptoms.  She will follow up if symptoms persist over the next 2-4 weeks   - montelukast (SINGULAIR) 10 MG tablet; Take 1 tablet (10 mg) by mouth At Bedtime  Dispense: 30 tablet; Refill: 1     BMI:   Estimated body mass index is 29.33 kg/m  as calculated from the following:    Height as of this encounter: 1.664 m (5' 5.5\").    Weight as of this encounter: 81.2 kg (179 lb).       Follow up as needed    No follow-ups on file.    Edmond Muhammad PA-C  [facility] [place]      "

## 2019-07-30 NOTE — TELEPHONE ENCOUNTER
Left message for patient to return call to clinic and ask to speak with a nurse.  Advised patient if there is difficulty breathing or SOB advised patient to go to UC or ED to be assessed, if not having difficulty breathing then return call to clinic and speak to triage.    After reviewing calls, patient is currently speaking with another nurse.    STARR PadillaN, RN  [facility]

## 2019-07-30 NOTE — TELEPHONE ENCOUNTER
Called # below     Pt stated that her and her two children have been exposed to mold in their home, they have been having hoarse sounding voices, slight cough, congestion, runny nose ( clear).  This has been going on for several weeks     Advised pt that she needs to be seen as with her kids     Made a OV for today in EP     Advised if things worsen ( reviewed worsening symptoms) need to go to the ER     Patient stated an understanding and agreed with plan.    Marcia Mcdonald RN, BSN  [facility]

## 2019-09-13 ENCOUNTER — HOSPITAL ENCOUNTER (EMERGENCY)
Facility: CLINIC | Age: 49
Discharge: HOME | End: 2019-09-14
Attending: EMERGENCY MEDICINE | Admitting: EMERGENCY MEDICINE
Payer: MEDICARE

## 2019-09-13 DIAGNOSIS — N28.1 RENAL CYST: ICD-10-CM

## 2019-09-13 DIAGNOSIS — K80.20 CALCULUS OF GALLBLADDER WITHOUT CHOLECYSTITIS WITHOUT OBSTRUCTION: ICD-10-CM

## 2019-09-13 DIAGNOSIS — R10.13 EPIGASTRIC PAIN: ICD-10-CM

## 2019-09-13 PROCEDURE — 85025 COMPLETE CBC W/AUTO DIFF WBC: CPT | Performed by: EMERGENCY MEDICINE

## 2019-09-13 PROCEDURE — 84484 ASSAY OF TROPONIN QUANT: CPT | Performed by: EMERGENCY MEDICINE

## 2019-09-13 PROCEDURE — 83690 ASSAY OF LIPASE: CPT | Performed by: EMERGENCY MEDICINE

## 2019-09-13 PROCEDURE — 80053 COMPREHEN METABOLIC PANEL: CPT | Performed by: EMERGENCY MEDICINE

## 2019-09-13 PROCEDURE — 36415 COLL VENOUS BLD VENIPUNCTURE: CPT

## 2019-09-13 PROCEDURE — 99285 EMERGENCY DEPT VISIT HI MDM: CPT | Mod: 25

## 2019-09-13 RX ORDER — ONDANSETRON 2 MG/ML
4 INJECTION INTRAMUSCULAR; INTRAVENOUS ONCE
Status: COMPLETED | OUTPATIENT
Start: 2019-09-13 | End: 2019-09-14

## 2019-09-13 RX ORDER — KETOROLAC TROMETHAMINE 15 MG/ML
15 INJECTION, SOLUTION INTRAMUSCULAR; INTRAVENOUS ONCE
Status: COMPLETED | OUTPATIENT
Start: 2019-09-13 | End: 2019-09-14

## 2019-09-13 ASSESSMENT — ENCOUNTER SYMPTOMS
DIFFICULTY URINATING: 0
HEMATURIA: 0
NAUSEA: 1
VOMITING: 0
FREQUENCY: 0
ABDOMINAL PAIN: 1
DYSURIA: 0

## 2019-09-13 ASSESSMENT — MIFFLIN-ST. JEOR: SCORE: 1450.75

## 2019-09-13 NOTE — ED AVS SNAPSHOT
[facility] Emergency Department  [address]  [facility] [address]  Phone:  [phone]  Fax:  [phone]                                    Bernarda Garrett   MRN: [id number]    Department:  [facility] Emergency Department   Date of Visit:  9/13/2019           After Visit Summary Signature Page    I have received my discharge instructions, and my questions have been answered. I have discussed any challenges I see with this plan with the nurse or doctor.    ..........................................................................................................................................  Patient/Patient Representative Signature      ..........................................................................................................................................  Patient Representative Print Name and Relationship to Patient    ..................................................               ................................................  Date                                   Time    ..........................................................................................................................................  Reviewed by Signature/Title    ...................................................              ..............................................  Date                                               Time          22EPIC Rev 08/18

## 2019-09-14 ENCOUNTER — APPOINTMENT (OUTPATIENT)
Dept: ULTRASOUND IMAGING | Facility: CLINIC | Age: 49
End: 2019-09-14
Attending: EMERGENCY MEDICINE
Payer: MEDICARE

## 2019-09-14 VITALS
OXYGEN SATURATION: 100 % | DIASTOLIC BLOOD PRESSURE: 86 MMHG | SYSTOLIC BLOOD PRESSURE: 145 MMHG | WEIGHT: 179 LBS | HEIGHT: 66 IN | HEART RATE: 70 BPM | RESPIRATION RATE: 20 BRPM | TEMPERATURE: 98 F | BODY MASS INDEX: 28.77 KG/M2

## 2019-09-14 LAB
ALBUMIN SERPL-MCNC: 3.9 G/DL (ref 3.4–5)
ALP SERPL-CCNC: 70 U/L (ref 40–150)
ALT SERPL W P-5'-P-CCNC: 26 U/L (ref 0–50)
ANION GAP SERPL CALCULATED.3IONS-SCNC: 6 MMOL/L (ref 3–14)
AST SERPL W P-5'-P-CCNC: 38 U/L (ref 0–45)
BASOPHILS # BLD AUTO: 0 10E9/L (ref 0–0.2)
BASOPHILS NFR BLD AUTO: 0.5 %
BILIRUB SERPL-MCNC: 0.6 MG/DL (ref 0.2–1.3)
BUN SERPL-MCNC: 10 MG/DL (ref 7–30)
CALCIUM SERPL-MCNC: 8.5 MG/DL (ref 8.5–10.1)
CHLORIDE SERPL-SCNC: 107 MMOL/L (ref 94–109)
CO2 SERPL-SCNC: 27 MMOL/L (ref 20–32)
CREAT SERPL-MCNC: 0.88 MG/DL (ref 0.52–1.04)
DIFFERENTIAL METHOD BLD: ABNORMAL
EOSINOPHIL # BLD AUTO: 0.1 10E9/L (ref 0–0.7)
EOSINOPHIL NFR BLD AUTO: 0.7 %
ERYTHROCYTE [DISTWIDTH] IN BLOOD BY AUTOMATED COUNT: 15 % (ref 10–15)
GFR SERPL CREATININE-BSD FRML MDRD: 78 ML/MIN/{1.73_M2}
GLUCOSE SERPL-MCNC: 94 MG/DL (ref 70–99)
HCT VFR BLD AUTO: 39.8 % (ref 35–47)
HGB BLD-MCNC: 12.7 G/DL (ref 11.7–15.7)
IMM GRANULOCYTES # BLD: 0 10E9/L (ref 0–0.4)
IMM GRANULOCYTES NFR BLD: 0.5 %
INTERPRETATION ECG - MUSE: NORMAL
LIPASE SERPL-CCNC: 92 U/L (ref 73–393)
LYMPHOCYTES # BLD AUTO: 3.1 10E9/L (ref 0.8–5.3)
LYMPHOCYTES NFR BLD AUTO: 37.2 %
MCH RBC QN AUTO: 24.7 PG (ref 26.5–33)
MCHC RBC AUTO-ENTMCNC: 31.9 G/DL (ref 31.5–36.5)
MCV RBC AUTO: 77 FL (ref 78–100)
MONOCYTES # BLD AUTO: 0.8 10E9/L (ref 0–1.3)
MONOCYTES NFR BLD AUTO: 10 %
NEUTROPHILS # BLD AUTO: 4.3 10E9/L (ref 1.6–8.3)
NEUTROPHILS NFR BLD AUTO: 51.1 %
NRBC # BLD AUTO: 0 10*3/UL
NRBC BLD AUTO-RTO: 0 /100
PLATELET # BLD AUTO: 314 10E9/L (ref 150–450)
POTASSIUM SERPL-SCNC: 3.5 MMOL/L (ref 3.4–5.3)
PROT SERPL-MCNC: 7.7 G/DL (ref 6.8–8.8)
RBC # BLD AUTO: 5.14 10E12/L (ref 3.8–5.2)
SODIUM SERPL-SCNC: 140 MMOL/L (ref 133–144)
TROPONIN I SERPL-MCNC: <0.015 UG/L (ref 0–0.04)
WBC # BLD AUTO: 8.4 10E9/L (ref 4–11)

## 2019-09-14 PROCEDURE — 93005 ELECTROCARDIOGRAM TRACING: CPT

## 2019-09-14 PROCEDURE — 96375 TX/PRO/DX INJ NEW DRUG ADDON: CPT

## 2019-09-14 PROCEDURE — 76705 ECHO EXAM OF ABDOMEN: CPT

## 2019-09-14 PROCEDURE — 25800030 ZZH RX IP 258 OP 636: Performed by: EMERGENCY MEDICINE

## 2019-09-14 PROCEDURE — 25000125 ZZHC RX 250: Performed by: EMERGENCY MEDICINE

## 2019-09-14 PROCEDURE — 25000132 ZZH RX MED GY IP 250 OP 250 PS 637: Mod: GY | Performed by: EMERGENCY MEDICINE

## 2019-09-14 PROCEDURE — 25000128 H RX IP 250 OP 636: Performed by: EMERGENCY MEDICINE

## 2019-09-14 PROCEDURE — 96361 HYDRATE IV INFUSION ADD-ON: CPT

## 2019-09-14 PROCEDURE — 96374 THER/PROPH/DIAG INJ IV PUSH: CPT

## 2019-09-14 RX ORDER — ONDANSETRON 4 MG/1
4 TABLET, ORALLY DISINTEGRATING ORAL EVERY 8 HOURS PRN
Qty: 10 TABLET | Refills: 0 | Status: SHIPPED | OUTPATIENT
Start: 2019-09-14 | End: 2019-09-17

## 2019-09-14 RX ADMIN — LIDOCAINE HYDROCHLORIDE 30 ML: 20 SOLUTION ORAL; TOPICAL at 00:07

## 2019-09-14 RX ADMIN — ONDANSETRON 4 MG: 2 INJECTION INTRAMUSCULAR; INTRAVENOUS at 00:04

## 2019-09-14 RX ADMIN — SODIUM CHLORIDE, POTASSIUM CHLORIDE, SODIUM LACTATE AND CALCIUM CHLORIDE 1000 ML: 600; 310; 30; 20 INJECTION, SOLUTION INTRAVENOUS at 00:03

## 2019-09-14 RX ADMIN — KETOROLAC TROMETHAMINE 15 MG: 15 INJECTION, SOLUTION INTRAMUSCULAR; INTRAVENOUS at 00:05

## 2019-09-14 NOTE — ED PROVIDER NOTES
"  History     Chief Complaint:  Abdominal Pain    The history is provided by the patient.      Bernarda Garrett is a 49 year old female with a history of hypertension who presents to the emergency department today via EMS for evaluation of abdominal pain. The patient reports the pain is crampy and localized to her abdomen and radiates somewhat into her chest. The patient reports she's had this type of pain in the past, but that it's never lasted this long. The patient reports being nauseated but she has not vomited. Her last BM was three days ago. She denies urinary symptoms, or a history of problems with her pancreas. The patient adds she's had costochondritis in the past, but that this feels distinct. Her last menstrual period was around 08/15/2019.    Allergies:  Peanuts   Dilaudid   Minocycline  Latex      Medications:    Albuterol inhaler  EpiPen  Nuvaring  Ativan  Zoloft  Vitamin D3     Past Medical History:    Hypertension  Heart murmur  Bipolar affective disorder  Mitral valve disorder  Anxiety and Depression    Past Surgical History:    Surgical history reviewed. No pertinent surgical history.    Family History:    Alcohol and drug use  Asthma  Heart disease- mother and father  Hyperlipidemia  Hypertension    Social History:  The patient was unaccompanied to the ED.  Smoking Status: Never Smoker  Smokeless Tobacco: Never Used  Alcohol Use: Negative   Drug Use: Negative    Marital Status:      Review of Systems   Gastrointestinal: Positive for abdominal pain and nausea. Negative for vomiting.   Genitourinary: Negative for difficulty urinating, dysuria, frequency and hematuria.   All other systems reviewed and are negative.        Physical Exam     Patient Vitals for the past 24 hrs:   BP Temp Temp src Pulse Heart Rate Resp SpO2 Height Weight   09/14/19 0000 (!) 162/97 -- -- 73 -- -- 99 % -- --   09/13/19 2343 (!) 183/102 98  F (36.7  C) Oral -- 70 20 100 % 1.664 m (5' 5.5\") 81.2 kg (179 lb)        Physical " Exam  Gen: well appearing, in no acute distress  HEENT:  mmm, no rhinorrhea  Neck: supple, no abnormal swelling  Lungs:  CTAB,  no resp distress  CV: rrr, no m/r/g, ppi  Abd: soft, + ttp epigastrium, nondistended, no rebound/masses/guarding/hsm  Ext: no peripheral edema  Skin: warm, dry, well perfused, no rashes/bruising/lesions on exposed skin  Neuro: alert, MAEE, no gross motor or sensory deficits, gait stable  Psych: Normal mood, normal affect      Emergency Department Course     ECG: NSR, nonspecific t wave change anterior precordial leads, unchanged from 5/29/19, read at 0040    Imaging:  Radiology findings were communicated with the patient who voiced understanding of the findings.  US, Abdomen, Limited                                                                   IMPRESSION:   1. Numerous small gallstones within the gallbladder. No evidence of acute cholecystitis.  2. No biliary dilatation. The common duct is at the upper limits of normal in caliber, measuring 0.6 cm in diameter.   Reading per radiology     Laboratory:  Laboratory findings were communicated with the patient who voiced understanding of the findings.  CBC: WNL (WBC 8.4, HGB 12.7, )  CMP: AWNL (Creatinine 0.88)  Lipase: 92  Troponin (Collected 2355): undetectable       Procedures:      Interventions:  0003 Lactated Ringer 1,000 mL IV  0004 Zofran 4 mg IV   0005 Toradol 15 mg IV  0007 Lidocaine 30 mL PO       Emergency Department Course:  2340 Nursing notes and vitals reviewed.  2340 I performed an exam of the patient as documented above.   2356 IV was inserted and blood was drawn for laboratory testing, results above.   The patient was sent for an abdominal US while in the emergency department, results above.          I personally reviewed the laboratory and imaging results with the Patient and answered all related questions prior to  discharge.    Impression & Plan        Medical Decision Making:  Epigastric and RUQ abdominal pain  which is concerning for cholecystitis/cholelithiasis/biliary colic v choledocholithiasis v SBO v enterocolitis v PUD v acute hepatitis v cholangitis v ureterolithasis v pyelonephritis. I think it is less likely aaa v aortic dissection v acute mesenteric ischemia v atypical appendicitis, ectopic pregnancy, FHC, or primary cardiopulmonary disorder.      Update: Symptoms completely resolved with the above interventions here in the emergency department.  Blood test showed no significant abnormalities EKG and troponin unremarkable.  Ultrasound showing cholelithiasis with no signs of cholecystitis.  Incidental note of a large right renal cyst which was seen on a CT scan in 2018 viewable in care everywhere.  Repeat abdominal examination benign.  Symptoms likely due to biliary colic cannot rule out associated for concomitant GERD peptic ulcer disease.  Given she is symptomatically better with a benign abdominal examination no signs of common duct obstruction or cholecystitis think she be safely discharged home we discussed biliary colic when to follow-up with surgery when to return here to the emergency department.          Diagnosis:    ICD-10-CM    1. Calculus of gallbladder without cholecystitis without obstruction K80.20    2. Epigastric pain R10.13    3. Renal cyst N28.1        Disposition:  discharged to home    Discharge Medications:  New Prescriptions    ACETAMINOPHEN-CODEINE (TYLENOL #3) 300-30 MG TABLET    Take 1-2 tablets by mouth every 6 hours as needed for severe pain    ONDANSETRON (ZOFRAN ODT) 4 MG ODT TAB    Take 1 tablet (4 mg) by mouth every 8 hours as needed for nausea or vomiting       Scribe Disclosure:  I, Maureen Pantoja, am serving as a scribe at 11:44 PM on 9/13/2019 to document services personally performed by Tima Holman, based on my observations and the provider's statements to me.    9/13/2019   [facility] EMERGENCY DEPARTMENT       Tima Holman MD  09/14/19  [id number]

## 2019-09-14 NOTE — ED NOTES
Bed: ED32  Expected date: 9/13/19  Expected time: 11:26 PM  Means of arrival: Ambulance  Comments:  [phone]

## 2019-09-14 NOTE — DISCHARGE INSTRUCTIONS

## 2019-10-14 ENCOUNTER — HOSPITAL ENCOUNTER (EMERGENCY)
Facility: CLINIC | Age: 49
Discharge: HOME | End: 2019-10-15
Attending: EMERGENCY MEDICINE | Admitting: EMERGENCY MEDICINE
Payer: MEDICARE

## 2019-10-14 ENCOUNTER — OFFICE VISIT (OUTPATIENT)
Dept: SURGERY | Facility: CLINIC | Age: 49
End: 2019-10-14
Payer: MEDICARE

## 2019-10-14 VITALS
DIASTOLIC BLOOD PRESSURE: 113 MMHG | SYSTOLIC BLOOD PRESSURE: 173 MMHG | TEMPERATURE: 97.4 F | WEIGHT: 173 LBS | BODY MASS INDEX: 28.35 KG/M2 | OXYGEN SATURATION: 94 %

## 2019-10-14 VITALS — SYSTOLIC BLOOD PRESSURE: 147 MMHG | DIASTOLIC BLOOD PRESSURE: 86 MMHG | HEART RATE: 69 BPM

## 2019-10-14 DIAGNOSIS — R07.89 CHEST WALL PAIN: ICD-10-CM

## 2019-10-14 DIAGNOSIS — K80.20 GALLSTONES: Primary | ICD-10-CM

## 2019-10-14 PROCEDURE — 93005 ELECTROCARDIOGRAM TRACING: CPT

## 2019-10-14 PROCEDURE — 99283 EMERGENCY DEPT VISIT LOW MDM: CPT

## 2019-10-14 PROCEDURE — 99204 OFFICE O/P NEW MOD 45 MIN: CPT | Performed by: SURGERY

## 2019-10-14 SDOH — HEALTH STABILITY: MENTAL HEALTH: HOW OFTEN DO YOU HAVE A DRINK CONTAINING ALCOHOL?: 4 OR MORE TIMES A WEEK

## 2019-10-14 SDOH — HEALTH STABILITY: MENTAL HEALTH: HOW OFTEN DO YOU HAVE 6 OR MORE DRINKS ON ONE OCCASION?: MONTHLY

## 2019-10-14 SDOH — HEALTH STABILITY: MENTAL HEALTH: HOW MANY STANDARD DRINKS CONTAINING ALCOHOL DO YOU HAVE ON A TYPICAL DAY?: 1 OR 2

## 2019-10-14 NOTE — PROGRESS NOTES
Chief complaint:  Abdominal pain, right upper quadrant    HPI: Recent right upper quadrant pain.  This patient is a 49 year old female who presents with the patient was seen in the emergency room, where she was found to have multiple small gallstones.  The pain radiated somewhat to her chest.  She has had similar pain in the past, but it has always been more short-lived.  Patient has had nausea, but no vomiting.  Her last bowel movement was 3 days prior to that attack.  Patient does note that she has costochondritis and has somewhat chronic pain on that basis.  This feels different.    Past Medical History:  Hypertension  Heart murmur  Bipolar affective disorder  Mitral valve disorder  Anxiety and Depression    Past Surgical History:  No previous surgeries     Medications:    Albuterol inhaler  EpiPen  Nuvaring  Ativan  Zoloft  Vitamin D3    Social History:  Social History     Socioeconomic History     Marital status:      Spouse name: Not on file     Number of children: Not on file     Years of education: Not on file     Highest education level: Not on file   Occupational History     Not on file   Social Needs     Financial resource strain: Not on file     Food insecurity:     Worry: Not on file     Inability: Not on file     Transportation needs:     Medical: Not on file     Non-medical: Not on file   Tobacco Use     Smoking status: Never Smoker     Smokeless tobacco: Never Used   Substance and Sexual Activity     Alcohol use: Yes     Alcohol/week: 4.0 - 8.0 standard drinks     Types: 4 - 8 Cans of beer per week     Frequency: 4 or more times a week     Drinks per session: 1 or 2     Binge frequency: Monthly     Drug use: No     Sexual activity: Not Currently     Partners: Male   Lifestyle     Physical activity:     Days per week: Not on file     Minutes per session: Not on file     Stress: Not on file   Relationships     Social connections:     Talks on phone: Not on file     Gets together: Not on file      Attends Yazidi service: Not on file     Active member of club or organization: Not on file     Attends meetings of clubs or organizations: Not on file     Relationship status: Not on file     Intimate partner violence:     Fear of current or ex partner: Not on file     Emotionally abused: Not on file     Physically abused: Not on file     Forced sexual activity: Not on file   Other Topics Concern     Parent/sibling w/ CABG, MI or angioplasty before 65F 55M? No   Social History Narrative     Not on file        Family History:  Alcohol and drug use  Asthma  Heart disease- mother and father  Hyperlipidemia  Hypertension    Review of Systems:  The 10 point Review of Systems is negative other than noted in the HPI and above.    Physical Exam:  General - This is a well developed, well nourished female in no apparent distress.  She appears somewhat anxious.  HEENT - Normocephalic, atraumatic.  Mucosa is moist.    No scleral icterus.  Neck - supple without masses.  No lymphadenopathy.  Lungs - clear to ascultation.    Heart - Regular rate & rhythm without murmur  Abdomen:   soft, non-distended and nontender.  no masses palpated. normal bowel sounds.  Extremities - warm without edema  Neurologic - nonfocal  Skin appears normal.  Small umbilical hernia.    Relevant labs:  Normal liver function tests.  White blood cell count 8400.    Imaging:  Ultrasound showed multiple small stones in the gallbladder.  No evidence of ductal dilatation.    Assessment and Plan:  This is a patient with gallstones and recent right upper quadrant and epigastric pain.  I have recommended Laparoscopic cholecystectomy.  Discussed procedure, risks and complications.  We will work on scheduling surgery at the patient s convenience.    Will Martínez MD  [facility]    Please route or send letter to:  *None*

## 2019-10-14 NOTE — ED AVS SNAPSHOT
[facility] Emergency Department  [address]  [facility] [address]  Phone:  [phone]  Fax:  [phone]                                    Bernarda Garrett   MRN: [id number]    Department:  [facility] Emergency Department   Date of Visit:  10/14/2019           After Visit Summary Signature Page    I have received my discharge instructions, and my questions have been answered. I have discussed any challenges I see with this plan with the nurse or doctor.    ..........................................................................................................................................  Patient/Patient Representative Signature      ..........................................................................................................................................  Patient Representative Print Name and Relationship to Patient    ..................................................               ................................................  Date                                   Time    ..........................................................................................................................................  Reviewed by Signature/Title    ...................................................              ..............................................  Date                                               Time          22EPIC Rev 08/18

## 2019-10-15 LAB — INTERPRETATION ECG - MUSE: NORMAL

## 2019-10-15 PROCEDURE — 25000132 ZZH RX MED GY IP 250 OP 250 PS 637: Performed by: EMERGENCY MEDICINE

## 2019-10-15 RX ORDER — LIDOCAINE 4 G/G
1 PATCH TOPICAL ONCE
Status: DISCONTINUED | OUTPATIENT
Start: 2019-10-15 | End: 2019-10-15 | Stop reason: HOSPADM

## 2019-10-15 RX ORDER — LIDOCAINE 50 MG/G
1 PATCH TOPICAL EVERY 24 HOURS
Qty: 15 PATCH | Refills: 0 | Status: SHIPPED | OUTPATIENT
Start: 2019-10-15 | End: 2019-10-28

## 2019-10-15 RX ORDER — NAPROXEN 500 MG/1
250 TABLET ORAL 2 TIMES DAILY PRN
Qty: 30 TABLET | Refills: 0 | Status: SHIPPED | OUTPATIENT
Start: 2019-10-15

## 2019-10-15 RX ORDER — NAPROXEN 250 MG/1
500 TABLET ORAL ONCE
Status: COMPLETED | OUTPATIENT
Start: 2019-10-15 | End: 2019-10-15

## 2019-10-15 RX ADMIN — NAPROXEN 500 MG: 250 TABLET ORAL at 00:27

## 2019-10-15 RX ADMIN — LIDOCAINE 1 PATCH: 560 PATCH PERCUTANEOUS; TOPICAL; TRANSDERMAL at 00:27

## 2019-10-15 ASSESSMENT — ENCOUNTER SYMPTOMS
NAUSEA: 0
FEVER: 0
DIAPHORESIS: 0
ABDOMINAL PAIN: 0
COUGH: 0
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
## 2019-10-15 NOTE — ED NOTES
"Pt refusing blood draw for ISTAT troponin. Pt educated on importance and reason for test being ordered. Pt states \"I have a fear of needles and don't want to be poked\". MD notified and aware.  "

## 2019-10-28 ENCOUNTER — APPOINTMENT (OUTPATIENT)
Dept: GENERAL RADIOLOGY | Facility: CLINIC | Age: 49
End: 2019-10-28
Attending: EMERGENCY MEDICINE
Payer: MEDICARE

## 2019-10-28 ENCOUNTER — HOSPITAL ENCOUNTER (EMERGENCY)
Facility: CLINIC | Age: 49
Discharge: HOME | End: 2019-10-28
Attending: EMERGENCY MEDICINE | Admitting: EMERGENCY MEDICINE
Payer: MEDICARE

## 2019-10-28 VITALS
TEMPERATURE: 99.1 F | OXYGEN SATURATION: 98 % | SYSTOLIC BLOOD PRESSURE: 154 MMHG | HEART RATE: 57 BPM | DIASTOLIC BLOOD PRESSURE: 56 MMHG | RESPIRATION RATE: 17 BRPM

## 2019-10-28 DIAGNOSIS — R07.89 CHEST WALL PAIN: ICD-10-CM

## 2019-10-28 LAB
ALBUMIN SERPL-MCNC: 3.7 G/DL (ref 3.4–5)
ALBUMIN UR-MCNC: NEGATIVE MG/DL
ALP SERPL-CCNC: 45 U/L (ref 40–150)
ALT SERPL W P-5'-P-CCNC: 15 U/L (ref 0–50)
ANION GAP SERPL CALCULATED.3IONS-SCNC: 5 MMOL/L (ref 3–14)
APPEARANCE UR: CLEAR
AST SERPL W P-5'-P-CCNC: 15 U/L (ref 0–45)
BACTERIA #/AREA URNS HPF: ABNORMAL /HPF
BASOPHILS # BLD AUTO: 0 10E9/L (ref 0–0.2)
BASOPHILS NFR BLD AUTO: 0.9 %
BILIRUB SERPL-MCNC: 0.4 MG/DL (ref 0.2–1.3)
BILIRUB UR QL STRIP: NEGATIVE
BUN SERPL-MCNC: 8 MG/DL (ref 7–30)
CALCIUM SERPL-MCNC: 8.3 MG/DL (ref 8.5–10.1)
CHLORIDE SERPL-SCNC: 110 MMOL/L (ref 94–109)
CO2 SERPL-SCNC: 25 MMOL/L (ref 20–32)
COLOR UR AUTO: ABNORMAL
CREAT SERPL-MCNC: 0.87 MG/DL (ref 0.52–1.04)
DIFFERENTIAL METHOD BLD: ABNORMAL
EOSINOPHIL # BLD AUTO: 0 10E9/L (ref 0–0.7)
EOSINOPHIL NFR BLD AUTO: 0.7 %
ERYTHROCYTE [DISTWIDTH] IN BLOOD BY AUTOMATED COUNT: 15 % (ref 10–15)
GFR SERPL CREATININE-BSD FRML MDRD: 78 ML/MIN/{1.73_M2}
GLUCOSE SERPL-MCNC: 94 MG/DL (ref 70–99)
GLUCOSE UR STRIP-MCNC: NEGATIVE MG/DL
HCG SERPL QL: NEGATIVE
HCT VFR BLD AUTO: 38.2 % (ref 35–47)
HGB BLD-MCNC: 12.1 G/DL (ref 11.7–15.7)
HGB UR QL STRIP: NEGATIVE
IMM GRANULOCYTES # BLD: 0 10E9/L (ref 0–0.4)
IMM GRANULOCYTES NFR BLD: 0.2 %
KETONES UR STRIP-MCNC: NEGATIVE MG/DL
LACTATE BLD-SCNC: 1.1 MMOL/L (ref 0.7–2)
LEUKOCYTE ESTERASE UR QL STRIP: NEGATIVE
LYMPHOCYTES # BLD AUTO: 2.3 10E9/L (ref 0.8–5.3)
LYMPHOCYTES NFR BLD AUTO: 49.3 %
MAGNESIUM SERPL-MCNC: 2.1 MG/DL (ref 1.6–2.3)
MCH RBC QN AUTO: 24.7 PG (ref 26.5–33)
MCHC RBC AUTO-ENTMCNC: 31.7 G/DL (ref 31.5–36.5)
MCV RBC AUTO: 78 FL (ref 78–100)
MONOCYTES # BLD AUTO: 0.5 10E9/L (ref 0–1.3)
MONOCYTES NFR BLD AUTO: 10.1 %
NEUTROPHILS # BLD AUTO: 1.8 10E9/L (ref 1.6–8.3)
NEUTROPHILS NFR BLD AUTO: 38.8 %
NITRATE UR QL: NEGATIVE
NRBC # BLD AUTO: 0 10*3/UL
NRBC BLD AUTO-RTO: 0 /100
PH UR STRIP: 6 PH (ref 5–7)
PLATELET # BLD AUTO: 310 10E9/L (ref 150–450)
POTASSIUM SERPL-SCNC: 3.7 MMOL/L (ref 3.4–5.3)
PROT SERPL-MCNC: 7.1 G/DL (ref 6.8–8.8)
RBC # BLD AUTO: 4.9 10E12/L (ref 3.8–5.2)
RBC #/AREA URNS AUTO: 1 /HPF (ref 0–2)
SODIUM SERPL-SCNC: 140 MMOL/L (ref 133–144)
SOURCE: ABNORMAL
SP GR UR STRIP: 1 (ref 1–1.03)
SQUAMOUS #/AREA URNS AUTO: 1 /HPF (ref 0–1)
TROPONIN I SERPL-MCNC: <0.015 UG/L (ref 0–0.04)
TSH SERPL DL<=0.005 MIU/L-ACNC: 1.15 MU/L (ref 0.4–4)
UROBILINOGEN UR STRIP-MCNC: NORMAL MG/DL (ref 0–2)
WBC # BLD AUTO: 4.6 10E9/L (ref 4–11)
WBC #/AREA URNS AUTO: <1 /HPF (ref 0–5)

## 2019-10-28 PROCEDURE — 25000128 H RX IP 250 OP 636: Performed by: EMERGENCY MEDICINE

## 2019-10-28 PROCEDURE — 83735 ASSAY OF MAGNESIUM: CPT | Performed by: EMERGENCY MEDICINE

## 2019-10-28 PROCEDURE — 80053 COMPREHEN METABOLIC PANEL: CPT | Performed by: EMERGENCY MEDICINE

## 2019-10-28 PROCEDURE — 99285 EMERGENCY DEPT VISIT HI MDM: CPT | Mod: 25

## 2019-10-28 PROCEDURE — 84703 CHORIONIC GONADOTROPIN ASSAY: CPT | Performed by: EMERGENCY MEDICINE

## 2019-10-28 PROCEDURE — 96375 TX/PRO/DX INJ NEW DRUG ADDON: CPT

## 2019-10-28 PROCEDURE — 81001 URINALYSIS AUTO W/SCOPE: CPT | Performed by: EMERGENCY MEDICINE

## 2019-10-28 PROCEDURE — 85025 COMPLETE CBC W/AUTO DIFF WBC: CPT | Performed by: EMERGENCY MEDICINE

## 2019-10-28 PROCEDURE — 96374 THER/PROPH/DIAG INJ IV PUSH: CPT

## 2019-10-28 PROCEDURE — 84443 ASSAY THYROID STIM HORMONE: CPT | Performed by: EMERGENCY MEDICINE

## 2019-10-28 PROCEDURE — 84484 ASSAY OF TROPONIN QUANT: CPT | Performed by: EMERGENCY MEDICINE

## 2019-10-28 PROCEDURE — 93005 ELECTROCARDIOGRAM TRACING: CPT

## 2019-10-28 PROCEDURE — 25000132 ZZH RX MED GY IP 250 OP 250 PS 637: Mod: GY | Performed by: EMERGENCY MEDICINE

## 2019-10-28 PROCEDURE — 83605 ASSAY OF LACTIC ACID: CPT | Performed by: EMERGENCY MEDICINE

## 2019-10-28 PROCEDURE — 71046 X-RAY EXAM CHEST 2 VIEWS: CPT

## 2019-10-28 RX ORDER — LIDOCAINE 50 MG/G
1 PATCH TOPICAL EVERY 24 HOURS
Qty: 10 PATCH | Refills: 0 | Status: SHIPPED | OUTPATIENT
Start: 2019-10-28 | End: 2019-11-07

## 2019-10-28 RX ORDER — LIDOCAINE 4 G/G
1 PATCH TOPICAL ONCE
Status: DISCONTINUED | OUTPATIENT
Start: 2019-10-28 | End: 2019-10-28 | Stop reason: HOSPADM

## 2019-10-28 RX ORDER — LIDOCAINE 40 MG/G
CREAM TOPICAL
Status: DISCONTINUED | OUTPATIENT
Start: 2019-10-28 | End: 2019-10-28 | Stop reason: HOSPADM

## 2019-10-28 RX ORDER — KETOROLAC TROMETHAMINE 15 MG/ML
15 INJECTION, SOLUTION INTRAMUSCULAR; INTRAVENOUS ONCE
Status: COMPLETED | OUTPATIENT
Start: 2019-10-28 | End: 2019-10-28

## 2019-10-28 RX ORDER — ONDANSETRON 2 MG/ML
4 INJECTION INTRAMUSCULAR; INTRAVENOUS EVERY 30 MIN PRN
Status: DISCONTINUED | OUTPATIENT
Start: 2019-10-28 | End: 2019-10-28 | Stop reason: HOSPADM

## 2019-10-28 RX ADMIN — ONDANSETRON HYDROCHLORIDE 4 MG: 2 INJECTION, SOLUTION INTRAMUSCULAR; INTRAVENOUS at 15:05

## 2019-10-28 RX ADMIN — LIDOCAINE 1 PATCH: 560 PATCH PERCUTANEOUS; TOPICAL; TRANSDERMAL at 15:04

## 2019-10-28 RX ADMIN — KETOROLAC TROMETHAMINE 15 MG: 15 INJECTION, SOLUTION INTRAMUSCULAR; INTRAVENOUS at 15:05

## 2019-10-28 ASSESSMENT — ENCOUNTER SYMPTOMS
FREQUENCY: 1
HEMATURIA: 0
DYSURIA: 0
FEVER: 0
DIARRHEA: 0
VOMITING: 0

## 2019-10-28 NOTE — DISCHARGE INSTRUCTIONS
Use lidoderm patch for your chest wall pain  You can use Salonpas lidocaine patch too. They are 4% lidocaine instead of 5%

## 2019-10-28 NOTE — ED AVS SNAPSHOT
[facility] Emergency Department  [address]  [facility] [address]  Phone:  [phone]  Fax:  [phone]                                    Bernarda Garrett   MRN: [id number]    Department:  [facility] Emergency Department   Date of Visit:  10/28/2019           After Visit Summary Signature Page    I have received my discharge instructions, and my questions have been answered. I have discussed any challenges I see with this plan with the nurse or doctor.    ..........................................................................................................................................  Patient/Patient Representative Signature      ..........................................................................................................................................  Patient Representative Print Name and Relationship to Patient    ..................................................               ................................................  Date                                   Time    ..........................................................................................................................................  Reviewed by Signature/Title    ...................................................              ..............................................  Date                                               Time          22EPIC Rev 08/18

## 2019-10-28 NOTE — ED TRIAGE NOTES
Arrives with multiple complaints including chest pain, pelvic pain, fatigue and depression. Alert and oriented, ABCs intact.

## 2019-10-28 NOTE — ED PROVIDER NOTES
History     Chief Complaint:  Chest Pain    The history is provided by the patient.      Bernarda Garrett is a 49 year old female, with chronic costochondritis and sciatica, who presents with chest pain. About an hour ago (1530), patient was sitting down doing laundry when she had an onset of diffuse chest wall pain that radiates to the right side of her upper back. It gets better when she lays down but worsens with movement. She notes she is always moving as she has two autistic children and can never rest. Patient tried taking ibuprofen and Tylenol six hours ago (0800) but felt no relief. Patient's pain today is similar to her previous costochondritis that is typically 7/10 with ibuprofen/Tylenol but more severe today, prompting her to the ED. Here, patient also has had pelvic pain when her bladder is full and increased urine frequency for the past few days. She becomes tearful when she states she has eye irritation from mold in her house that has been present since 2017. She has been stressed due to this as she is suppose to move out but cannot find another place to live. No hematuria, dysuria, fever, vomiting or diarrhea.    Allergies:  Peanuts  Dilaudid   Minocycline  Latex    Medications:    The patient is not currently taking any prescribed medications.    Past Medical History:      Mitral valve disorder   Ance  Bipolar disorder   Heart murmur   HTN  GERD  PTSD  Advanced maternal age multigravida  Abnormal serum protein electrophoresis  Depression  Anxiety  Sciatica   Diabetes     Past Surgical History:    Dental surgery   D and C    Family History:    Alcohol/drug abuse  Sickle cell trait - brother   Heart disease - father, mother  HLD - father, mother  Asthma     Social History:  Negative for tobacco use.  Positive for alcohol use.   Negative for drug use.  Marital Status:  .      Review of Systems   Constitutional: Negative for fever.   Cardiovascular: Positive for chest pain.   Gastrointestinal:  Negative for diarrhea and vomiting.   Genitourinary: Positive for frequency and pelvic pain. Negative for dysuria and hematuria.   All other systems reviewed and are negative.        Physical Exam     Patient Vitals for the past 24 hrs:   BP Temp Temp src Pulse Heart Rate Resp SpO2   10/28/19 1600 -- -- -- -- 55 17 --   10/28/19 1545 -- -- -- 57 57 16 --   10/28/19 1530 -- -- -- 63 64 22 --   10/28/19 1528 (!) 154/56 -- -- -- 61 16 --   10/28/19 1515 (!) 154/56 -- -- 57 64 18 98 %   10/28/19 1500 (!) 144/98 -- -- 64 73 13 99 %   10/28/19 1445 (!) 147/93 -- -- 60 56 13 98 %   10/28/19 1430 (!) 144/91 -- -- 68 67 11 99 %   10/28/19 1415 (!) 151/94 -- -- 66 -- -- 100 %   10/28/19 1354 (!) 146/98 99.1  F (37.3  C) Temporal 76 -- 20 100 %     Physical Exam  Constitutional:       Appearance: Normal appearance. She is well-developed.   HENT:      Right Ear: Tympanic membrane and external ear normal.      Left Ear: Tympanic membrane and external ear normal.      Mouth/Throat:      Mouth: Mucous membranes are moist.      Pharynx: Oropharynx is clear. No oropharyngeal exudate.   Eyes:      General: No scleral icterus.     Conjunctiva/sclera: Conjunctivae normal.      Pupils: Pupils are equal, round, and reactive to light.   Neck:      Musculoskeletal: Normal range of motion and neck supple.      Vascular: No JVD.   Cardiovascular:      Rate and Rhythm: Normal rate and regular rhythm.      Pulses: Normal pulses.      Heart sounds: Normal heart sounds. No murmur. No friction rub. No gallop.    Pulmonary:      Effort: Pulmonary effort is normal. No respiratory distress.      Breath sounds: Normal breath sounds. No wheezing or rales.   Chest:      Chest wall: Tenderness present.      Comments: Diffuse chest wall tenderness to palpation. No CVA tenderness.       Abdominal:      General: Bowel sounds are normal. There is no distension.      Palpations: Abdomen is soft. There is no mass.      Tenderness: There is tenderness.       Comments: Mild suprapubic TTP, no CVAT   Musculoskeletal: Normal range of motion.   Lymphadenopathy:      Cervical: No cervical adenopathy.   Skin:     General: Skin is warm and dry.      Findings: No rash.   Neurological:      General: No focal deficit present.      Mental Status: She is alert.      Cranial Nerves: No cranial nerve deficit.   Psychiatric:      Comments: Tearful at times on exam       Emergency Department Course   ECG  Indication: Chest pain  Time: 1414  Vent. Rate 60 bpm. TX interval 140. QRS duration 84. QT/QTc 430/430. P-R-T axis 43 -2 8. Normal sinus rhythm. Nonspecific T wave abnormality. Abnormal ECG. Agrees with computer interpretations. Read time: 1418.    Imaging:  Radiographic findings were communicated with the patient who voiced understanding of the findings.    XR Chest 2 Views   Preliminary Result   IMPRESSION: Cardiomediastinal silhouette is normal. Lungs are clear.   No pleural fluid. No acute disease. No significant change.     As per radiology.      Laboratory:  CBC: WBC: 4.6, HGB: 12.1, PLT: 310  CMP: Chloride 110 (H), Calcium 8.3 (L), o/w WNL (Creatinine: 0.87)  Magnesium: 2.1  TSH: 1.15   HCG qualitative blood: negative.   1507 Troponin: <0.015  1507 Lactic acid whole blood: 1.1     UA with micro: bacteria few, o/w negative    Interventions:  1504 Toradol 15 mg IV  1504 Lidocaine 4% 1 patch transdermal   1505 Zofran 4 mg IV     Emergency Department Course:  1420 Nursing notes and vitals reviewed. I performed an exam of the patient as documented above.     The patient was placed on continuous pulse oximetry and cardiac monitoring while here in the ED.      IV inserted. Medicine administered as documented above. Blood drawn. This was sent to the lab for further testing, results above. The patient provided a urine sample here in the emergency department. This was sent for laboratory testing, findings above.    EKG obtained in the ED, see results above.     1439 I consulted with the  care coordinator regarding the patient's history and presentation here in the emergency department.    The patient was sent for a chest XR while in the emergency department, findings above.     1602 I consulted with the care coordinator again.    1635 I rechecked the patient and discussed the results of her workup thus far.     Findings and plan explained to the Patient. Patient discharged home with instructions regarding supportive care, medications, and reasons to return. The importance of close follow-up was reviewed. The patient was prescribed lidocaine patches.     I personally reviewed the laboratory results with the Patient and answered all related questions prior to discharge.     Impression & Plan    Medical Decision Making:  Bernarda Garrett is a 49 year old female who presents with multiple complaints including chronic, ongoing chest wall pain that sounds like it was exacerbated today. She does have a lot of social stressors such as a housing situation that is not stable. We did have the care coordinator see her in the ER. So far workup is negative. She has palpable tenderness on exam. I am concerned that this is cardiac or any other findings such as pneumonia or any findings on chest x-ray. She is non toxic, non hypoxic, or tachycardic. I do not feel that she needs to be evaluated for a PE. There is no rash on the site of her pain to suggest shingles. She is prescribed and is given another prescription of Lidoderm patch at her request. I did discuss with her that she can use over the counter ones, thought they are only 4 percent versus 5 percent, if she is unable to fill her prescription. She is referred back to her doctor for follow up or to the ED if her symptoms worsen. Diagnosis: Atypical chest pain.         Diagnosis:  Atypica chest pain    Disposition:  discharged to home    Discharge Medications:  New Prescriptions    LIDOCAINE (LIDODERM) 5 % PATCH    Place 1 patch onto the skin every 24 hours for 10  days     Scribe Disposition  I, Ngozi Rodriguez, am serving as a scribe on 10/28/2019 at 2:32 PM to personally document services performed by Josue Marley MD based on my observations and the provider's statements to me.     Ngozi Rodriguez  10/28/2019   [facility] EMERGENCY DEPARTMENT       Josue Marley MD  10/28/19 212

## 2019-10-28 NOTE — ED NOTES
Date of RN Care Coordinator Intervention: 10/28/19    Collaborated with:  Pt; ED MD    Data:  Pt presenting the ED for evaluation of chest pain.  She reports a high level of stress due to her housing situation.  Reports that she has been notified that she must vacate her apartment as of the end of Nov.  She is in [address] [employer] housing with her 2 young sons (both autistic).   She's being evicted because [employer] has refused to continue paying the rent until the landlord fixes a problem with mold in the home.        Intervention:  Met with patient in her room.  She is tearful; polite and appreciative of support.   Shares that she has spoken to  and has multiple  available to her for herself and her sons.  She is not worried about her finances, just locating  alternative housing.  Provided her with resources for shelters and crisis intervention.   Also encouraged to reach out to the  staff at her sons school as they will likely be aware of housing options in the area.        Assessment:  High stress due to housing situation.  Has [facility] support in place.   Responds well to problem solving and additional resources.      Plan:    Anticipated Disposition:  pending    Barriers to d/c plan:  Medical readiness    Follow Up:  Pt to follow up with [facility] resources as needed.      Sara Garrett RN, LICSW, CCM  RN Care Coordinator  [facility], Emergency Department  Phone  [phone]

## 2019-10-30 ENCOUNTER — APPOINTMENT (OUTPATIENT)
Dept: CT IMAGING | Facility: CLINIC | Age: 49
End: 2019-10-30
Attending: EMERGENCY MEDICINE
Payer: MEDICARE

## 2019-10-30 ENCOUNTER — HOSPITAL ENCOUNTER (EMERGENCY)
Facility: CLINIC | Age: 49
Discharge: HOME | End: 2019-10-30
Attending: EMERGENCY MEDICINE | Admitting: EMERGENCY MEDICINE
Payer: MEDICARE

## 2019-10-30 VITALS
DIASTOLIC BLOOD PRESSURE: 94 MMHG | HEART RATE: 64 BPM | SYSTOLIC BLOOD PRESSURE: 149 MMHG | OXYGEN SATURATION: 99 % | TEMPERATURE: 98.2 F | RESPIRATION RATE: 18 BRPM

## 2019-10-30 DIAGNOSIS — R10.9 RIGHT SIDED ABDOMINAL PAIN: ICD-10-CM

## 2019-10-30 LAB
ALBUMIN SERPL-MCNC: 3.8 G/DL (ref 3.4–5)
ALBUMIN UR-MCNC: NEGATIVE MG/DL
ALP SERPL-CCNC: 54 U/L (ref 40–150)
ALT SERPL W P-5'-P-CCNC: 16 U/L (ref 0–50)
ANION GAP SERPL CALCULATED.3IONS-SCNC: 4 MMOL/L (ref 3–14)
APPEARANCE UR: CLEAR
AST SERPL W P-5'-P-CCNC: 13 U/L (ref 0–45)
B-HCG FREE SERPL-ACNC: <5 IU/L
BACTERIA #/AREA URNS HPF: ABNORMAL /HPF
BASOPHILS # BLD AUTO: 0 10E9/L (ref 0–0.2)
BASOPHILS NFR BLD AUTO: 0.8 %
BILIRUB SERPL-MCNC: 0.5 MG/DL (ref 0.2–1.3)
BILIRUB UR QL STRIP: NEGATIVE
BUN SERPL-MCNC: 9 MG/DL (ref 7–30)
CALCIUM SERPL-MCNC: 8.8 MG/DL (ref 8.5–10.1)
CHLORIDE SERPL-SCNC: 108 MMOL/L (ref 94–109)
CO2 SERPL-SCNC: 26 MMOL/L (ref 20–32)
COLOR UR AUTO: ABNORMAL
CREAT SERPL-MCNC: 0.97 MG/DL (ref 0.52–1.04)
DIFFERENTIAL METHOD BLD: ABNORMAL
EOSINOPHIL # BLD AUTO: 0 10E9/L (ref 0–0.7)
EOSINOPHIL NFR BLD AUTO: 0.8 %
ERYTHROCYTE [DISTWIDTH] IN BLOOD BY AUTOMATED COUNT: 14.8 % (ref 10–15)
GFR SERPL CREATININE-BSD FRML MDRD: 68 ML/MIN/{1.73_M2}
GLUCOSE SERPL-MCNC: 91 MG/DL (ref 70–99)
GLUCOSE UR STRIP-MCNC: NEGATIVE MG/DL
HCT VFR BLD AUTO: 38.9 % (ref 35–47)
HGB BLD-MCNC: 12.8 G/DL (ref 11.7–15.7)
HGB UR QL STRIP: NEGATIVE
IMM GRANULOCYTES # BLD: 0 10E9/L (ref 0–0.4)
IMM GRANULOCYTES NFR BLD: 0.2 %
KETONES UR STRIP-MCNC: NEGATIVE MG/DL
LEUKOCYTE ESTERASE UR QL STRIP: NEGATIVE
LIPASE SERPL-CCNC: 67 U/L (ref 73–393)
LYMPHOCYTES # BLD AUTO: 2.1 10E9/L (ref 0.8–5.3)
LYMPHOCYTES NFR BLD AUTO: 43.6 %
MCH RBC QN AUTO: 25.2 PG (ref 26.5–33)
MCHC RBC AUTO-ENTMCNC: 32.9 G/DL (ref 31.5–36.5)
MCV RBC AUTO: 77 FL (ref 78–100)
MONOCYTES # BLD AUTO: 0.5 10E9/L (ref 0–1.3)
MONOCYTES NFR BLD AUTO: 9.5 %
NEUTROPHILS # BLD AUTO: 2.1 10E9/L (ref 1.6–8.3)
NEUTROPHILS NFR BLD AUTO: 45.1 %
NITRATE UR QL: NEGATIVE
NRBC # BLD AUTO: 0 10*3/UL
NRBC BLD AUTO-RTO: 0 /100
PH UR STRIP: 6.5 PH (ref 5–7)
PLATELET # BLD AUTO: 320 10E9/L (ref 150–450)
POTASSIUM SERPL-SCNC: 3.7 MMOL/L (ref 3.4–5.3)
PROT SERPL-MCNC: 7.7 G/DL (ref 6.8–8.8)
RBC # BLD AUTO: 5.08 10E12/L (ref 3.8–5.2)
RBC #/AREA URNS AUTO: 1 /HPF (ref 0–2)
SODIUM SERPL-SCNC: 138 MMOL/L (ref 133–144)
SOURCE: ABNORMAL
SP GR UR STRIP: 1.01 (ref 1–1.03)
SQUAMOUS #/AREA URNS AUTO: 1 /HPF (ref 0–1)
UROBILINOGEN UR STRIP-MCNC: NORMAL MG/DL (ref 0–2)
WBC # BLD AUTO: 4.7 10E9/L (ref 4–11)
WBC #/AREA URNS AUTO: 1 /HPF (ref 0–5)

## 2019-10-30 PROCEDURE — 84702 CHORIONIC GONADOTROPIN TEST: CPT

## 2019-10-30 PROCEDURE — 83690 ASSAY OF LIPASE: CPT | Performed by: EMERGENCY MEDICINE

## 2019-10-30 PROCEDURE — 96361 HYDRATE IV INFUSION ADD-ON: CPT

## 2019-10-30 PROCEDURE — 85025 COMPLETE CBC W/AUTO DIFF WBC: CPT | Performed by: EMERGENCY MEDICINE

## 2019-10-30 PROCEDURE — 25000128 H RX IP 250 OP 636: Performed by: EMERGENCY MEDICINE

## 2019-10-30 PROCEDURE — 81001 URINALYSIS AUTO W/SCOPE: CPT | Performed by: EMERGENCY MEDICINE

## 2019-10-30 PROCEDURE — 80053 COMPREHEN METABOLIC PANEL: CPT | Performed by: EMERGENCY MEDICINE

## 2019-10-30 PROCEDURE — 99285 EMERGENCY DEPT VISIT HI MDM: CPT | Mod: 25

## 2019-10-30 PROCEDURE — 96374 THER/PROPH/DIAG INJ IV PUSH: CPT | Mod: 59

## 2019-10-30 PROCEDURE — 74177 CT ABD & PELVIS W/CONTRAST: CPT

## 2019-10-30 RX ORDER — ACETAMINOPHEN 325 MG/1
975 TABLET ORAL ONCE
Status: DISCONTINUED | OUTPATIENT
Start: 2019-10-30 | End: 2019-10-30 | Stop reason: HOSPADM

## 2019-10-30 RX ORDER — ONDANSETRON 2 MG/ML
4 INJECTION INTRAMUSCULAR; INTRAVENOUS ONCE
Status: COMPLETED | OUTPATIENT
Start: 2019-10-30 | End: 2019-10-30

## 2019-10-30 RX ORDER — IOPAMIDOL 755 MG/ML
86 INJECTION, SOLUTION INTRAVASCULAR ONCE
Status: COMPLETED | OUTPATIENT
Start: 2019-10-30 | End: 2019-10-30

## 2019-10-30 RX ADMIN — ONDANSETRON HYDROCHLORIDE 4 MG: 2 INJECTION, SOLUTION INTRAMUSCULAR; INTRAVENOUS at 14:04

## 2019-10-30 RX ADMIN — IOPAMIDOL 86 ML: 755 INJECTION, SOLUTION INTRAVENOUS at 14:54

## 2019-10-30 RX ADMIN — SODIUM CHLORIDE 1000 ML: 9 INJECTION, SOLUTION INTRAVENOUS at 14:04

## 2019-10-30 ASSESSMENT — ENCOUNTER SYMPTOMS
ABDOMINAL PAIN: 1
SINUS PRESSURE: 1
HEMATURIA: 0
FEVER: 0
FREQUENCY: 0
DYSURIA: 0
APPETITE CHANGE: 1

## 2019-10-30 NOTE — ED PROVIDER NOTES
History     Chief Complaint:  Abdominal Pain      HPI   Bernarda Garrett is a 49 year old female who presents with a sudden onset of abdominal pain which has been persistent for the past 5-6 hours. She also endorses decreased appetite, nausea and sinus pressure. She has a history of sinus infections and she states she has one currently but is not on antibiotics. She has not undergone any abdominal surgeries. LMP 9/12/19. Last BM was today and this was normal. Patient denies urinary frequency, dysuria, hematuria, vaginal bleeding, vaginal discharge and fever.    Allergies:  Peanuts  Dilaudid   Minocycline  Latex     Medications:    Albuterol   EpiPen  Nuvaring  Loratadine  Montelukast  Naproxen      Past Medical History:    Acne  Bipolar affective disorder  HTN  Mitral valve disorder    Past Surgical History:    History reviewed. No pertinent past surgical history.    Family History:    History reviewed. No pertinent family history.    Social History:  Presents to the ED by herself.   Tobacco Use: Never  Alcohol Use: 4-8 drinks per week  [facility]  Marital Status:   [4]     Review of Systems   Constitutional: Positive for appetite change. Negative for fever.   HENT: Positive for sinus pressure.    Gastrointestinal: Positive for abdominal pain.   Genitourinary: Negative for dysuria, frequency, hematuria, vaginal bleeding and vaginal discharge.   All other systems reviewed and are negative.      Physical Exam   First Vitals:  BP: (!) 161/96  Pulse: 70  Heart Rate: 73  Temp: 98.2  F (36.8  C)  Resp: 18  SpO2: 100 %      Physical Exam  Constitutional: Alert, attentive, GCS 15  HENT:    Nose: Nose normal.    Mouth/Throat: Oropharynx is clear, mucous membranes are moist  Eyes: EOM are normal, anicteric, conjugate gaze  CV: regular rate and rhythm; no murmurs  Chest: Effort normal and breath sounds clear without wheezing or rales, symmetric bilaterally   GI:  Diffuse right-sided abdominal  tenderness. No distension. No guarding or rebound.    MSK: No LE edema, no tenderness to palpation of BLE.  Neurological: Alert, attentive, moving all extremities equally.   Skin: Skin is warm and dry.    Emergency Department Course     Imaging:  Radiographic findings were communicated with the patient who voiced understanding of the findings.    Abd/pelvis CT,  IV  contrast only TRAUMA / AAA   Final Result   IMPRESSION:   1. No specific acute abnormality is seen.   2. Appendix is identified without clear inflammatory change.   3. Multiple renal cysts.      SOMMER MONAHAN MD        Laboratory:  CBC:  WBC 4.7, HGB 12.8, , otherwise WNL  CMP: WNL (Creatinine 0.97)  Lipase: 67 (L)  UA: Clear straw urine, few bacteria, otherwise WNL  HCG: Negative    Interventions:  1404: Normal Saline, 1 liter, IV bolus   1404: Zofran, 4 mg, IV injection     Medications   0.9% sodium chloride BOLUS (0 mLs Intravenous Stopped 10/30/19 1504)   ondansetron (ZOFRAN) injection 4 mg (4 mg Intravenous Given 10/30/19 1404)   iopamidol (ISOVUE-370) solution 86 mL (86 mLs Intravenous Given 10/30/19 1454)   sodium chloride (PF) 0.9% PF flush 62 mL (62 mLs Intravenous Given 10/30/19 1454)       Impression & Plan      Medical Decision Makin-year-old woman past medical history significant for bipolar disorder, housing difficulties presenting for evaluation of right lower abdominal pain.  Broad differential was considered including whole viscus perforation, obstruction, appendicitis or diverticulitis, fortunately CT imaging was negative for any acute etiology.  Labs are unremarkable.  After dose of pain medication she was able to tolerate p.o. and was felt safe for discharge home.  I do not suspect pelvic etiology based on her exam.  UA is negative and further testing was deferred.  Recommended conservative management Tylenol, ibuprofen and warm compress for comfort.    Diagnosis:    ICD-10-CM    1. Right sided abdominal pain R10.9         Disposition:  discharged to home    Brent Brito MD  [employer]  4:59 PM 10/30/19     I, Naomy Ventura, am serving as a scribe on 10/30/2019 at 1:41 PM to personally document services performed by Dr. Brito based on my observations and the provider's statements to me.   [facility] EMERGENCY DEPARTMENT       Brent Brito MD  10/30/19 8195

## 2019-10-30 NOTE — ED AVS SNAPSHOT
[facility] Emergency Department  [address]  [facility] [address]  Phone:  [phone]  Fax:  [phone]                                    Bernarda Garrett   MRN: [id number]    Department:  [facility] Emergency Department   Date of Visit:  10/30/2019           After Visit Summary Signature Page    I have received my discharge instructions, and my questions have been answered. I have discussed any challenges I see with this plan with the nurse or doctor.    ..........................................................................................................................................  Patient/Patient Representative Signature      ..........................................................................................................................................  Patient Representative Print Name and Relationship to Patient    ..................................................               ................................................  Date                                   Time    ..........................................................................................................................................  Reviewed by Signature/Title    ...................................................              ..............................................  Date                                               Time          22EPIC Rev 08/18

## 2019-10-30 NOTE — ED TRIAGE NOTES
Pt presents with RLQ pain for the last 5hrs. Denies recent injury. Reports nausea, no vomiting. Regular BMs.  ABCs intact. A&OX4.

## 2019-11-29 ENCOUNTER — APPOINTMENT (OUTPATIENT)
Dept: ULTRASOUND IMAGING | Facility: CLINIC | Age: 49
End: 2019-11-29
Attending: EMERGENCY MEDICINE
Payer: MEDICARE

## 2019-11-29 ENCOUNTER — APPOINTMENT (OUTPATIENT)
Dept: GENERAL RADIOLOGY | Facility: CLINIC | Age: 49
End: 2019-11-29
Attending: EMERGENCY MEDICINE
Payer: MEDICARE

## 2019-11-29 ENCOUNTER — HOSPITAL ENCOUNTER (EMERGENCY)
Facility: CLINIC | Age: 49
Discharge: HOME | End: 2019-11-29
Attending: EMERGENCY MEDICINE | Admitting: EMERGENCY MEDICINE
Payer: MEDICARE

## 2019-11-29 VITALS
TEMPERATURE: 99.3 F | BODY MASS INDEX: 28.84 KG/M2 | RESPIRATION RATE: 13 BRPM | DIASTOLIC BLOOD PRESSURE: 97 MMHG | WEIGHT: 176 LBS | OXYGEN SATURATION: 100 % | HEART RATE: 60 BPM | SYSTOLIC BLOOD PRESSURE: 124 MMHG

## 2019-11-29 DIAGNOSIS — R07.89 CHEST WALL PAIN: ICD-10-CM

## 2019-11-29 LAB
ALBUMIN SERPL-MCNC: 3.7 G/DL (ref 3.4–5)
ALP SERPL-CCNC: 79 U/L (ref 40–150)
ALT SERPL W P-5'-P-CCNC: 84 U/L (ref 0–50)
ANION GAP SERPL CALCULATED.3IONS-SCNC: 5 MMOL/L (ref 3–14)
AST SERPL W P-5'-P-CCNC: 129 U/L (ref 0–45)
BASOPHILS # BLD AUTO: 0 10E9/L (ref 0–0.2)
BASOPHILS NFR BLD AUTO: 0.5 %
BILIRUB DIRECT SERPL-MCNC: 0.4 MG/DL (ref 0–0.2)
BILIRUB SERPL-MCNC: 1.1 MG/DL (ref 0.2–1.3)
BUN SERPL-MCNC: 11 MG/DL (ref 7–30)
CALCIUM SERPL-MCNC: 8.4 MG/DL (ref 8.5–10.1)
CHLORIDE SERPL-SCNC: 108 MMOL/L (ref 94–109)
CO2 SERPL-SCNC: 25 MMOL/L (ref 20–32)
CREAT SERPL-MCNC: 0.94 MG/DL (ref 0.52–1.04)
DIFFERENTIAL METHOD BLD: ABNORMAL
EOSINOPHIL # BLD AUTO: 0 10E9/L (ref 0–0.7)
EOSINOPHIL NFR BLD AUTO: 0.2 %
ERYTHROCYTE [DISTWIDTH] IN BLOOD BY AUTOMATED COUNT: 15.1 % (ref 10–15)
GFR SERPL CREATININE-BSD FRML MDRD: 71 ML/MIN/{1.73_M2}
GLUCOSE SERPL-MCNC: 80 MG/DL (ref 70–99)
HCT VFR BLD AUTO: 39.3 % (ref 35–47)
HGB BLD-MCNC: 12.3 G/DL (ref 11.7–15.7)
IMM GRANULOCYTES # BLD: 0 10E9/L (ref 0–0.4)
IMM GRANULOCYTES NFR BLD: 0.2 %
LIPASE SERPL-CCNC: 75 U/L (ref 73–393)
LYMPHOCYTES # BLD AUTO: 1.4 10E9/L (ref 0.8–5.3)
LYMPHOCYTES NFR BLD AUTO: 24.3 %
MCH RBC QN AUTO: 24.4 PG (ref 26.5–33)
MCHC RBC AUTO-ENTMCNC: 31.3 G/DL (ref 31.5–36.5)
MCV RBC AUTO: 78 FL (ref 78–100)
MONOCYTES # BLD AUTO: 0.4 10E9/L (ref 0–1.3)
MONOCYTES NFR BLD AUTO: 7.2 %
NEUTROPHILS # BLD AUTO: 4 10E9/L (ref 1.6–8.3)
NEUTROPHILS NFR BLD AUTO: 67.6 %
NRBC # BLD AUTO: 0 10*3/UL
NRBC BLD AUTO-RTO: 0 /100
PLATELET # BLD AUTO: 299 10E9/L (ref 150–450)
POTASSIUM SERPL-SCNC: 3.5 MMOL/L (ref 3.4–5.3)
PROT SERPL-MCNC: 7.4 G/DL (ref 6.8–8.8)
RBC # BLD AUTO: 5.04 10E12/L (ref 3.8–5.2)
SODIUM SERPL-SCNC: 138 MMOL/L (ref 133–144)
TROPONIN I SERPL-MCNC: <0.015 UG/L (ref 0–0.04)
TROPONIN I SERPL-MCNC: <0.015 UG/L (ref 0–0.04)
WBC # BLD AUTO: 5.8 10E9/L (ref 4–11)

## 2019-11-29 PROCEDURE — 84484 ASSAY OF TROPONIN QUANT: CPT | Mod: 91 | Performed by: EMERGENCY MEDICINE

## 2019-11-29 PROCEDURE — 85025 COMPLETE CBC W/AUTO DIFF WBC: CPT | Performed by: EMERGENCY MEDICINE

## 2019-11-29 PROCEDURE — 83690 ASSAY OF LIPASE: CPT | Performed by: EMERGENCY MEDICINE

## 2019-11-29 PROCEDURE — 80048 BASIC METABOLIC PNL TOTAL CA: CPT | Performed by: EMERGENCY MEDICINE

## 2019-11-29 PROCEDURE — 76705 ECHO EXAM OF ABDOMEN: CPT

## 2019-11-29 PROCEDURE — 99285 EMERGENCY DEPT VISIT HI MDM: CPT | Mod: 25

## 2019-11-29 PROCEDURE — 25000132 ZZH RX MED GY IP 250 OP 250 PS 637: Mod: GY | Performed by: EMERGENCY MEDICINE

## 2019-11-29 PROCEDURE — 93005 ELECTROCARDIOGRAM TRACING: CPT

## 2019-11-29 PROCEDURE — 80076 HEPATIC FUNCTION PANEL: CPT | Performed by: EMERGENCY MEDICINE

## 2019-11-29 PROCEDURE — 25000128 H RX IP 250 OP 636: Performed by: EMERGENCY MEDICINE

## 2019-11-29 PROCEDURE — 71046 X-RAY EXAM CHEST 2 VIEWS: CPT

## 2019-11-29 RX ORDER — ONDANSETRON 4 MG/1
4 TABLET, ORALLY DISINTEGRATING ORAL ONCE
Status: COMPLETED | OUTPATIENT
Start: 2019-11-29 | End: 2019-11-29

## 2019-11-29 RX ORDER — ACETAMINOPHEN 325 MG/1
975 TABLET ORAL ONCE
Status: COMPLETED | OUTPATIENT
Start: 2019-11-29 | End: 2019-11-29

## 2019-11-29 RX ORDER — OXYCODONE HYDROCHLORIDE 5 MG/1
5 TABLET ORAL ONCE
Status: COMPLETED | OUTPATIENT
Start: 2019-11-29 | End: 2019-11-29

## 2019-11-29 RX ORDER — ASPIRIN 325 MG
325 TABLET ORAL ONCE
Status: COMPLETED | OUTPATIENT
Start: 2019-11-29 | End: 2019-11-29

## 2019-11-29 RX ADMIN — ACETAMINOPHEN 975 MG: 325 TABLET, FILM COATED ORAL at 15:55

## 2019-11-29 RX ADMIN — OXYCODONE HYDROCHLORIDE 5 MG: 5 TABLET ORAL at 16:29

## 2019-11-29 RX ADMIN — ASPIRIN 325 MG ORAL TABLET 325 MG: 325 PILL ORAL at 15:55

## 2019-11-29 RX ADMIN — ONDANSETRON 4 MG: 4 TABLET, ORALLY DISINTEGRATING ORAL at 15:55

## 2019-11-29 ASSESSMENT — ENCOUNTER SYMPTOMS
CHILLS: 0
NAUSEA: 1
FEVER: 0
VOMITING: 0
COUGH: 0
BACK PAIN: 1
NECK PAIN: 0
SHORTNESS OF BREATH: 1

## 2019-11-29 NOTE — ED PROVIDER NOTES
History     Chief Complaint:  Chest Pain    HPI   Bernarda Garrett is a 49 year old female who presents to the emergency department for evaluation of chest pain. The patient reports that her chest pain began last night faintly and has since become significantly worse. The pain radiates to her back. The patient reports that she still has her gall bladder and gull stones. The patient reports pain with deep breathing, chest pain, shortness of breath, nausea, and back pain. The patient denies, neck pain, fevers, chills, cough, leg swelling, leg pain, or vomiting.    Cardiac Risk Factors   Sex: Female   Tobacco: Negative  Hypertension: Negative  Diabetes: Negative  Hyperlipidemia: Negative  Family History: Positive    Allergies:  Peanuts  Dilaudid  Minocycline  Latex  Guaifenesin & derivatives  Ipratropium    Medications:    Albuterol  Nuvaring  Claritin  Singulair  Naprosyn  Retin-a  Cholecalciferol  Zantac    Past Medical History:    Vaginal discharge  Mitral valve disorder  Acne  Bipolar affective disorder  Heart murmur  Hypertension  GERD  PTSD  Abdominal pain  AMA  Abnormal serum protein electrophoresis  Depression  Chest pain   Sciatica  Diabetes mellitus    Past Surgical History:    IMO dental surgery procedure  Dilation and curettage    Family History:    Alcohol/drug  Asthma  Heart disease  Hyperlipidemia  Hypertension    Social History:  The patient reports with her daughter.  Never smoker  Positive for alcohol use.   Negative for drug use.  Marital Status:      Review of Systems   Constitutional: Negative for chills and fever.   Respiratory: Positive for shortness of breath. Negative for cough.    Cardiovascular: Positive for chest pain. Negative for leg swelling.   Gastrointestinal: Positive for nausea. Negative for vomiting.   Musculoskeletal: Positive for back pain. Negative for neck pain.        Denies leg pain   All other systems reviewed and are negative.      Physical Exam     Patient Vitals for the  past 24 hrs:   BP Temp Temp src Pulse Resp SpO2 Weight   11/29/19 1548 -- -- -- -- -- -- 79.8 kg (176 lb)   11/29/19 1534 (!) 158/98 99.3  F (37.4  C) Temporal 65 18 99 % --     Physical Exam  General: Patient is awake, alert and interactive when I enter the room  Head: The scalp, face, and head appear normal  Eyes: The pupils are equal, round, and reactive to light. Conjunctivae and sclerae are normal  Neck: Normal range of motion. No anterior cervical lymphadenopathy noted  CV: Regular rate. S1/S2. No murmurs.   Resp: Lungs are clear without wheezes or rales. No respiratory distress.   GI: Abdomen is soft, no rigidity, guarding, or rebound. No distension. No tenderness to palpation in any quadrant.     MS: Normal tone. Joints grossly normal without effusions. No asymmetric leg swelling, calf or thigh tenderness.  Reproducible central chest wall pain, exacerbated with palpation.   Skin: No rash or lesions noted. Normal capillary refill noted  Neuro: Speech is normal and fluent. Face is symmetric. Moving all extremities.   Psych:  Normal affect.  Appropriate interactions.    Emergency Department Course     ECG:  Time: 1531  Vent. Rate 64 bpm. WA interval 138. QRS duration 88. QT/QTc 434/447. P-R-T axis 35 7 16.  Normal sinus rhythm  T wave abnormality, consider anterior ischemia  Unchanged from previous  Abnormal ECG  Read time: 1549    Imaging:  Radiology findings were communicated with the patient who voiced understanding of the findings.    XR Chest 2 Views:  No acute airspace disease.  As per radiology.    Abdomen US, limited (RUQ only):  1.  No definite acute abnormality although the gallbladder is not well seen. No large echogenic gallstones are demonstrated, correlating with recent CT abdomen pelvis.  As per radiology.    Laboratory:  Laboratory findings were communicated with the patient who voiced understanding of the findings.    CBC: WNL. (WBC 5.8, HGB 12.3, )   BMP: Calcium 8.4 (L) o/w WNL  (Creatinine 0.94)    1546 Troponin I <0.015    1546 Lipase 75    1546 Hepatic panel: Bilirubin direct 0.4 (H), Bilirubin total 1.1, Albumin 3.7, Protein 7.4, ALKOPHOS 79, ALT 84 (H),  (H)    1749 Troponin I <0.015    Interventions:  1555 Aspirin 325 mg PO    1555 Zofran 4 mg PO    1555 Tylenol 975 mg PO    1629 Roxicodone 5 mg PO    Emergency Department Course:    1531 EKG obtained as noted above.    1538 Nursing notes and vitals reviewed.    1541 I performed an exam of the patient as documented above.     1546 IV was inserted and blood was drawn for laboratory testing, results above.    1616 The patient was sent for a XR Chest 2 Views while in the emergency department, results above.     1649 The patient was sent for a Abdomen US, limited (RUQ only) while in the emergency department, results above.     1749 Blood was drawn for laboratory testing, results above.    Findings and plan explained to the Patient. Patient discharged home with instructions regarding supportive care, medications, and reasons to return. The importance of close follow-up was reviewed.     Impression & Plan     Medical Decision Making:  Bernarda Garrett is a 49 year old female who presents to the emergency department today with chest pain and right upper quadrant pain.  Upon initial evaluation she is hemodynamically stable vital signs.  She is afebrile.  On physical exam the patient has some central chest pain and right upper quadrant pain.  EKG was obtained which did not show any acute signs of ischemia.  She does have some T wave inversions that are chronic when compared to multiple previous EKGs.  Troponin was negative x2.  Patient underwent a gallbladder ultrasound which did not reveal any acute signs of cholecystitis.  The remainder of the patient's blood work was relatively unremarkable.  Patient's pain was significantly improved with the aforementioned interventions.  On reevaluation the patient's abdominal pain had nearly resolved and  her exam was reassuring.  There was no significant tenderness, guarding or rebound.  I did not feel any further advanced imaging was required at this point.  I do not feel that the patient requires admission for her chest pain as this is reproducible on palpation and has been significantly ruled out here in the emergency department.  At this point I feel it safe for her to follow-up with her primary care physician for further evaluation and treatment.    Discharge Diagnosis:    ICD-10-CM    1. Chest wall pain R07.89      Disposition:  The patient is discharged to home.    Scribe Disclosure:  I, Harshal Joe, am serving as a scribe at 3:49 PM on 11/29/2019 to document services personally performed by Augustin Portillo based on my observations and the provider's statements to me.      Augustin Portillo MD  11/30/19 0008

## 2019-11-29 NOTE — ED TRIAGE NOTES
Patient presents to ER for chest pain that started last night and worsening today. Pain gets worse with breathing. Patient reports mid sternal/epigastric pain and currently has costochondritis. ABC's intact.

## 2019-11-29 NOTE — ED AVS SNAPSHOT
[facility] Emergency Department  [address]  [facility] [address]  Phone:  [phone]  Fax:  [phone]                                    Bernarda Garrett   MRN: [id number]    Department:  [facility] Emergency Department   Date of Visit:  11/29/2019           After Visit Summary Signature Page    I have received my discharge instructions, and my questions have been answered. I have discussed any challenges I see with this plan with the nurse or doctor.    ..........................................................................................................................................  Patient/Patient Representative Signature      ..........................................................................................................................................  Patient Representative Print Name and Relationship to Patient    ..................................................               ................................................  Date                                   Time    ..........................................................................................................................................  Reviewed by Signature/Title    ...................................................              ..............................................  Date                                               Time          22EPIC Rev 08/18

## 2019-12-02 LAB — INTERPRETATION ECG - MUSE: NORMAL

## 2019-12-23 DIAGNOSIS — G89.29 CHRONIC RIGHT-SIDED LOW BACK PAIN WITH RIGHT-SIDED SCIATICA: ICD-10-CM

## 2019-12-23 DIAGNOSIS — M54.41 CHRONIC RIGHT-SIDED LOW BACK PAIN WITH RIGHT-SIDED SCIATICA: ICD-10-CM

## 2019-12-23 NOTE — TELEPHONE ENCOUNTER
Reason for Call:  Medication or medication refill:    Do you use a [facility]?  Name of the pharmacy and phone number for the current request:    [facility] [address] [place] [place] [facility] [address]      Name of the medication requested: ibuprofen (ADVIL/MOTRIN) 800 MG tablet    Other request: pt just had gall bladder surg and the oxycodine is to strong please call in a Motrin 800 to [facility] in [place] please.    Can we leave a detailed message on this number? YES    Phone number patient can be reached at: Cell number on file:    Telephone Information:   Mobile [phone]       Best Time: anytime    Call taken on 12/23/2019 at 3:14 PM by Bessy Liu

## 2019-12-23 NOTE — TELEPHONE ENCOUNTER
"Requested Prescriptions   Pending Prescriptions Disp Refills     ibuprofen (ADVIL/MOTRIN) 800 MG tablet          Last Written Prescription Date:  5.14.19  Last Fill Quantity: 60 tablet,  # refills: 0   Last office visit: 7/30/2019 with prescribing provider:  LEROY Cates         Future Office Visit:       60 tablet 0     Sig: Take 1 tablet (800 mg) by mouth every 8 hours as needed for moderate pain       NSAID Medications Failed - 12/23/2019  3:15 PM        Failed - Blood pressure under 140/90 in past 12 months     BP Readings from Last 3 Encounters:   11/29/19 (!) 124/97   10/30/19 (!) 149/94   10/28/19 (!) 154/56                 Failed - Normal ALT on file in past 12 months     Recent Labs   Lab Test 11/29/19  1546   ALT 84*             Failed - Normal AST on file in past 12 months     Recent Labs   Lab Test 11/29/19  1546   *             Failed - Recent (12 mo) or future (30 days) visit within the authorizing provider's specialty     Patient has had an office visit with the authorizing provider or a provider within the authorizing providers department within the previous 12 mos or has a future within next 30 days. See \"Patient Info\" tab in inbasket, or \"Choose Columns\" in Meds & Orders section of the refill encounter.              Passed - Patient is age 6-64 years        Passed - Normal CBC on file in past 12 months     Recent Labs   Lab Test 11/29/19  1546   WBC 5.8   RBC 5.04   HGB 12.3   HCT 39.3                    Passed - Medication is active on med list        Passed - No active pregnancy on record        Passed - Normal serum creatinine on file in past 12 months     Recent Labs   Lab Test 11/29/19  1546   CR 0.94             Passed - No positive pregnancy test in past 12 months        "

## 2019-12-24 DIAGNOSIS — J35.8 TONSILLOLITH: ICD-10-CM

## 2019-12-24 NOTE — TELEPHONE ENCOUNTER
There are no medications in this encounter.       acetaminophen (TYLENOL) 500 MG tablet     (Discontinued)      Last Written Prescription Date:  9.20.18  Last Fill Quantity: 30 TABLET,  # refills: 0   Last office visit: 7/30/2019 with prescribing provider:  Paulino Banks MD             Future Office Visit:

## 2019-12-26 RX ORDER — ACETAMINOPHEN 500 MG
1000 TABLET ORAL EVERY 8 HOURS PRN
Qty: 30 TABLET | Refills: 0 | Status: SHIPPED | OUTPATIENT
Start: 2019-12-26

## 2019-12-27 RX ORDER — IBUPROFEN 800 MG/1
800 TABLET, FILM COATED ORAL EVERY 8 HOURS PRN
Qty: 60 TABLET | Refills: 0 | Status: SHIPPED | OUTPATIENT
Start: 2019-12-27 | End: 2020-03-19

## 2020-03-16 ENCOUNTER — E-VISIT (OUTPATIENT)
Dept: FAMILY MEDICINE | Facility: CLINIC | Age: 50
End: 2020-03-16
Payer: MEDICARE

## 2020-03-16 DIAGNOSIS — B37.31 CANDIDAL VULVOVAGINITIS: ICD-10-CM

## 2020-03-16 DIAGNOSIS — B96.89 BACTERIAL VAGINOSIS: ICD-10-CM

## 2020-03-16 DIAGNOSIS — Z91.010 PEANUT ALLERGY: ICD-10-CM

## 2020-03-16 DIAGNOSIS — N76.0 BACTERIAL VAGINOSIS: ICD-10-CM

## 2020-03-16 PROCEDURE — 99421 OL DIG E/M SVC 5-10 MIN: CPT | Performed by: PHYSICIAN ASSISTANT

## 2020-03-16 RX ORDER — METRONIDAZOLE 500 MG/1
500 TABLET ORAL 2 TIMES DAILY
Qty: 14 TABLET | Refills: 0 | Status: SHIPPED | OUTPATIENT
Start: 2020-03-16 | End: 2020-03-23

## 2020-03-16 RX ORDER — METRONIDAZOLE 7.5 MG/G
1 GEL VAGINAL DAILY
Qty: 70 G | Refills: 0 | Status: CANCELLED | OUTPATIENT
Start: 2020-03-16

## 2020-03-16 RX ORDER — EPINEPHRINE 0.3 MG/.3ML
0.3 INJECTION SUBCUTANEOUS PRN
Qty: 0.6 ML | Refills: 1 | Status: CANCELLED | OUTPATIENT
Start: 2020-03-16

## 2020-03-16 RX ORDER — FLUCONAZOLE 150 MG/1
150 TABLET ORAL ONCE
Qty: 1 TABLET | Refills: 0 | Status: SHIPPED | OUTPATIENT
Start: 2020-03-16 | End: 2020-03-16

## 2020-03-16 NOTE — PROGRESS NOTES
Called patient to inform her of provider response below. Patient verbalized understanding and agrees with plan. Sent [employer] message with E-visit information. Patient verbalized understanding and agrees with plan.       Carmen Barber RN, BSN  [facility]

## 2020-03-16 NOTE — PATIENT INSTRUCTIONS
Thank you for choosing us for your care. I have placed an order for a prescription so that you can start treatment. View your full visit summary for details by clicking on the link below. Your pharmacist will able to address any questions you may have about the medication.     If you re not feeling better within 2-3 days, please schedule an appointment.  You can schedule an appointment right here in [facility], or call [phone]  If the visit is for the same symptoms as your e-visit, we ll refund the cost of your e-visit if seen within seven days.      Bacterial Vaginosis    You have a vaginal infection called bacterial vaginosis (BV). Both good and bad bacteria are present in a healthy vagina. BV occurs when these bacteria get out of balance. The number of bad bacteria increase. And the number of good bacteria decrease. Although BV is associated with sexual activity, it is not a sexually transmitted disease.  BV may or may not cause symptoms. If symptoms do occur, they can include:    Thin, gray, milky-white, or sometimes green discharge    Unpleasant odor or  fishy  smell    Itching, burning, or pain in or around the vagina  It is not known what causes BV, but certain factors can make the problem more likely. This can include:    Douching    Having sex with a new partner    Having sex with more than one partner  BV will sometimes go away on its own. But treatment is usually recommended. This is because untreated BV can increase the risk of more serious health problems such as:    Pelvic inflammatory disease (PID)     delivery (giving birth to a baby early if you re pregnant)    HIV and certain other sexually transmitted diseases (STDs)    Infection after surgery on the reproductive organs  Home care  General care    BV is most often treated with medicines called antibiotics. These may be given as pills or as a vaginal cream. If antibiotics are prescribed, be sure to use them exactly as directed. Also, be  sure to complete all of the medicine, even if your symptoms go away.    Don't douche or having sex during treatment.    If you have sex with a female partner, ask your healthcare provider if she should also be treated.  Prevention    Don't douche.    Don't have sex. If you do have sex, then take steps to lower your risk:  ? Use condoms when having sex.  ? Limit the number of sexual partners you have.  Follow-up care  Follow up with your healthcare provider, or as advised.  When to seek medical advice  Call your healthcare provider right away if:    You have a fever of 100.4 F (38 C) or higher, or as directed by your provider.    Your symptoms worsen, or they don t go away within a few days of starting treatment.    You have new pain in the lower belly or pelvic region.    You have side effects that bother you or a reaction to the pills or cream you re prescribed.    You or any partners you have sex with have new symptoms, such as a rash, joint pain, or sores.  Date Last Reviewed: 10/1/2017    3281-9274 [employer]. [address] [place] [address], [place] [address]. All rights reserved. This information is not intended as a substitute for professional medical care. Always follow your healthcare professional's instructions.

## 2020-03-16 NOTE — PROGRESS NOTES
Pt is having Vaginal symptoms, fishy smell and discharge. Pt requesting medication to treat BV.  Pt stated she is not having itching, rash, or fever, No dysuria, No hematuria, No flank/back pain.     Routing to RACHEL KERNS to review and advise. Pt last seen for this in 7/30/2019    Patient stated an understanding and agreed with plan.      Jaden Bay RN   [facility]

## 2020-03-18 DIAGNOSIS — G89.29 CHRONIC RIGHT-SIDED LOW BACK PAIN WITH RIGHT-SIDED SCIATICA: ICD-10-CM

## 2020-03-18 DIAGNOSIS — J30.2 SEASONAL ALLERGIC RHINITIS, UNSPECIFIED TRIGGER: ICD-10-CM

## 2020-03-18 DIAGNOSIS — M54.41 CHRONIC RIGHT-SIDED LOW BACK PAIN WITH RIGHT-SIDED SCIATICA: ICD-10-CM

## 2020-03-18 NOTE — TELEPHONE ENCOUNTER
"Requested Prescriptions   Pending Prescriptions Disp Refills     ibuprofen (ADVIL/MOTRIN) 800 MG tablet [Pharmacy Med Name: IBUPROFEN 800MG TABLETS] 60 tablet 0     Sig: TAKE 1 TABLET BY MOUTH THREE TIMES DAILY( EVERY EIGHT HOURS) AS NEEDED FOR MODERATE PAIN   Last Written Prescription Date:  12/27/2019  Last Fill Quantity: 60 tablet,  # refills: 0   Last office visit: 7/30/2019 with prescribing provider:  ISAEL Muhammad   Future Office Visit:        NSAID Medications Failed - 3/18/2020  1:59 PM        Failed - Blood pressure under 140/90 in past 12 months     BP Readings from Last 3 Encounters:   11/29/19 (!) 124/97   10/30/19 (!) 149/94   10/28/19 (!) 154/56                 Failed - Normal ALT on file in past 12 months     Recent Labs   Lab Test 11/29/19  1546   ALT 84*             Failed - Normal AST on file in past 12 months     Recent Labs   Lab Test 11/29/19  1546   *             Passed - Recent (12 mo) or future (30 days) visit within the authorizing provider's specialty     Patient has had an office visit with the authorizing provider or a provider within the authorizing providers department within the previous 12 mos or has a future within next 30 days. See \"Patient Info\" tab in inbasket, or \"Choose Columns\" in Meds & Orders section of the refill encounter.              Passed - Patient is age 6-64 years        Passed - Normal CBC on file in past 12 months     Recent Labs   Lab Test 11/29/19  1546   WBC 5.8   RBC 5.04   HGB 12.3   HCT 39.3                    Passed - Medication is active on med list        Passed - No active pregnancy on record        Passed - Normal serum creatinine on file in past 12 months     Recent Labs   Lab Test 11/29/19  1546   CR 0.94       Ok to refill medication if creatinine is low          Passed - No positive pregnancy test in past 12 months           montelukast (SINGULAIR) 10 MG tablet [Pharmacy Med Name: MONTELUKAST 10MG TABLETS] 30 tablet 1     Sig: TAKE 1 TABLET " "BY MOUTH EVERY NIGHT AT BEDTIME   Last Written Prescription Date:  07/30/2019  Last Fill Quantity: 30 tablet,  # refills: 1   Last office visit: 7/30/2019 with prescribing provider:  ISAEL Muhammad   Future Office Visit:        Leukotriene Inhibitors Protocol Passed - 3/18/2020  1:59 PM        Passed - Patient is age 12 or older     If patient is under 16, ok to refill using age based dosing.           Passed - Recent (12 mo) or future (30 days) visit within the authorizing provider's specialty     Patient has had an office visit with the authorizing provider or a provider within the authorizing providers department within the previous 12 mos or has a future within next 30 days. See \"Patient Info\" tab in inbasket, or \"Choose Columns\" in Meds & Orders section of the refill encounter.              Passed - Medication is active on med list             "

## 2020-03-18 NOTE — TELEPHONE ENCOUNTER
Routing refill request to provider for review/approval because:  Labs out of range:  ALT, AST, BP    STARR PadillaN, RN  [facility]

## 2020-03-19 DIAGNOSIS — J30.2 SEASONAL ALLERGIC RHINITIS, UNSPECIFIED TRIGGER: ICD-10-CM

## 2020-03-19 RX ORDER — MONTELUKAST SODIUM 10 MG/1
TABLET ORAL
Qty: 90 TABLET | Refills: 0 | Status: SHIPPED | OUTPATIENT
Start: 2020-03-19

## 2020-03-19 RX ORDER — MONTELUKAST SODIUM 10 MG/1
TABLET ORAL
Qty: 30 TABLET | Refills: 1 | Status: SHIPPED | OUTPATIENT
Start: 2020-03-19 | End: 2020-03-19

## 2020-03-19 RX ORDER — IBUPROFEN 800 MG/1
TABLET, FILM COATED ORAL
Qty: 60 TABLET | Refills: 0 | Status: SHIPPED | OUTPATIENT
Start: 2020-03-19 | End: 2021-01-22

## 2020-03-19 NOTE — TELEPHONE ENCOUNTER
"Requested Prescriptions   Pending Prescriptions Disp Refills     montelukast (SINGULAIR) 10 MG tablet [Pharmacy Med Name: MONTELUKAST 10MG TABLETS] 90 tablet      Sig: TAKE 1 TABLET BY MOUTH EVERY NIGHT AT BEDTIME   Last Written Prescription Date:  3/19/20  Last Fill Quantity: 30,  # refills: 1   Last office visit: 7/30/2019 with prescribing provider:  FAUSTINO   Future Office Visit:        Leukotriene Inhibitors Protocol Passed - 3/19/2020  8:28 AM        Passed - Patient is age 12 or older     If patient is under 16, ok to refill using age based dosing.           Passed - Recent (12 mo) or future (30 days) visit within the authorizing provider's specialty     Patient has had an office visit with the authorizing provider or a provider within the authorizing providers department within the previous 12 mos or has a future within next 30 days. See \"Patient Info\" tab in inbasket, or \"Choose Columns\" in Meds & Orders section of the refill encounter.              Passed - Medication is active on med list             "

## 2020-03-20 ENCOUNTER — TELEPHONE (OUTPATIENT)
Dept: FAMILY MEDICINE | Facility: CLINIC | Age: 50
End: 2020-03-20

## 2020-03-20 DIAGNOSIS — Z91.010 PEANUT ALLERGY: ICD-10-CM

## 2020-03-20 RX ORDER — EPINEPHRINE 0.3 MG/.3ML
0.3 INJECTION SUBCUTANEOUS PRN
Qty: 0.6 ML | Refills: 1 | Status: SHIPPED | OUTPATIENT
Start: 2020-03-20

## 2020-03-20 NOTE — TELEPHONE ENCOUNTER
Reason for Call:  Medication or medication refill:    Do you use a [facility]?  Name of the pharmacy and phone number for the current request:  [facility] in [place]    Name of the medication requested: EPINEPHrine (EPIPEN/ADRENACLICK/OR ANY BX GENERIC EQUIV) 0.3 MG/0.3ML injection 2-pack & etonogestrel-ethinyl estradiol (NUVARING) 0.12-0.015 MG/24HR vaginal ring     Other request: Please fill for patient & let her know when done.    Can we leave a detailed message on this number? YES    Phone number patient can be reached at: Cell number on file:    Telephone Information:   Mobile [phone]       Best Time: any    Call taken on 3/20/2020 at 11:16 AM by Rachele Triplett

## 2020-04-01 ENCOUNTER — NURSE TRIAGE (OUTPATIENT)
Dept: NURSING | Facility: CLINIC | Age: 50
End: 2020-04-01

## 2020-04-01 ENCOUNTER — OFFICE VISIT (OUTPATIENT)
Dept: URGENT CARE | Facility: URGENT CARE | Age: 50
End: 2020-04-01
Payer: MEDICARE

## 2020-04-01 ENCOUNTER — TELEPHONE (OUTPATIENT)
Dept: FAMILY MEDICINE | Facility: CLINIC | Age: 50
End: 2020-04-01

## 2020-04-01 VITALS — TEMPERATURE: 98.4 F | OXYGEN SATURATION: 100 % | HEART RATE: 72 BPM

## 2020-04-01 DIAGNOSIS — M26.609 TMJ (TEMPOROMANDIBULAR JOINT SYNDROME): ICD-10-CM

## 2020-04-01 DIAGNOSIS — J01.90 ACUTE SINUSITIS WITH SYMPTOMS > 10 DAYS: Primary | ICD-10-CM

## 2020-04-01 DIAGNOSIS — Z20.822 SUSPECTED COVID-19 VIRUS INFECTION: ICD-10-CM

## 2020-04-01 PROCEDURE — 99214 OFFICE O/P EST MOD 30 MIN: CPT | Performed by: INTERNAL MEDICINE

## 2020-04-01 RX ORDER — FLUTICASONE PROPIONATE 50 MCG
1 SPRAY, SUSPENSION (ML) NASAL 2 TIMES DAILY
Qty: 16 G | Refills: 0 | Status: SHIPPED | OUTPATIENT
Start: 2020-04-01 | End: 2020-04-11

## 2020-04-01 RX ORDER — IBUPROFEN 600 MG/1
600 TABLET, FILM COATED ORAL EVERY 6 HOURS PRN
Qty: 30 TABLET | Refills: 0 | Status: SHIPPED | OUTPATIENT
Start: 2020-04-01 | End: 2020-04-15

## 2020-04-01 RX ORDER — CEFDINIR 300 MG/1
300 CAPSULE ORAL 2 TIMES DAILY
Qty: 20 CAPSULE | Refills: 0 | Status: SHIPPED | OUTPATIENT
Start: 2020-04-01 | End: 2020-04-11

## 2020-04-01 ASSESSMENT — ENCOUNTER SYMPTOMS
SHORTNESS OF BREATH: 0
COUGH: 0

## 2020-04-01 NOTE — PROGRESS NOTES
SUBJECTIVE:   Bernarda Garrett is a 50 year old female presenting with a chief complaint of   Chief Complaint   Patient presents with     Sinus Problem       She is an established patient of [facility].    Adult    Onset of symptoms was 3/10  Dx sinus infection  treatment antibiotic    Current and Associated symptoms: stuffy nose, facial pain/pressure and headache  left ear hurts & behind  eye  No tooth pain  Treatment measures tried include sudafed.  amoxicillin  Predisposing factors include ill contact: Son with recent right lower lobe pneumonia.  Potential Covid exposures would be recent ER visits for her son.        Review of Systems   HENT: Positive for postnasal drip.         Does grind her teeth.   Respiratory: Negative for cough and shortness of breath.        Past Medical History:   Diagnosis Date     Hypertension      No family history on file.  Current Outpatient Medications   Medication Sig Dispense Refill     cefdinir (OMNICEF) 300 MG capsule Take 1 capsule (300 mg) by mouth 2 times daily for 10 days 20 capsule 0     fluticasone (FLONASE) 50 MCG/ACT nasal spray Spray 1 spray into both nostrils 2 times daily for 10 days 16 g 0     ibuprofen (ADVIL/MOTRIN) 600 MG tablet Take 1 tablet (600 mg) by mouth every 6 hours as needed for moderate pain 30 tablet 0     acetaminophen (TYLENOL) 500 MG tablet Take 2 tablets (1,000 mg) by mouth every 8 hours as needed for mild pain 30 tablet 0     albuterol (PROAIR HFA/PROVENTIL HFA/VENTOLIN HFA) 108 (90 Base) MCG/ACT inhaler Inhale 2 puffs into the lungs every 4 hours as needed for other (cough) 1 Inhaler 0     EPINEPHrine (ANY BX GENERIC EQUIV) 0.3 MG/0.3ML injection 2-pack Inject 0.3 mLs (0.3 mg) into the muscle as needed for anaphylaxis 0.6 mL 1     etonogestrel-ethinyl estradiol (NUVARING) 0.12-0.015 MG/24HR vaginal ring Insert 1 ring vaginally every 21 days then remove for 1 week then repeat with new ring. 3 each 1     ibuprofen (ADVIL/MOTRIN) 800 MG tablet TAKE 1 TABLET  BY MOUTH THREE TIMES DAILY( EVERY EIGHT HOURS) AS NEEDED FOR MODERATE PAIN 60 tablet 0     loratadine (CLARITIN) 10 MG tablet Take 1 tablet (10 mg) by mouth daily 90 tablet 3     montelukast (SINGULAIR) 10 MG tablet TAKE 1 TABLET BY MOUTH EVERY NIGHT AT BEDTIME 90 tablet 0     naproxen (NAPROSYN) 500 MG tablet Take 0.5 tablets (250 mg) by mouth 2 times daily as needed for moderate pain 30 tablet 0     order for DME Equipment being ordered: Shower chair with back, 400lb weight capacity 1 Units 0     tretinoin (RETIN-A) 0.025 % external cream Spread a pea size amount into affected area topically at bedtime.  Use sunscreen SPF>20. 45 g 11     vitamin D3 (CHOLECALCIFEROL) 1000 units (25 mcg) tablet Take 1000 units during the summer months, but increase to 2000 units during winter months. (Patient not taking: Reported on 10/14/2019) 100 tablet 3     Social History     Tobacco Use     Smoking status: Never Smoker     Smokeless tobacco: Never Used   Substance Use Topics     Alcohol use: Yes     Alcohol/week: 4.0 - 8.0 standard drinks     Types: 4 - 8 Cans of beer per week     Frequency: 4 or more times a week     Drinks per session: 1 or 2     Binge frequency: Monthly       OBJECTIVE  Pulse 72   Temp 98.4  F (36.9  C)   SpO2 100%     Physical Exam  Vitals signs reviewed.   Constitutional:       Appearance: Normal appearance. She is not ill-appearing or toxic-appearing.   HENT:      Right Ear: Tympanic membrane normal.      Left Ear: Tympanic membrane normal.      Ears:      Comments: Patient very tender with palpation of left TMJ    Sinus discomfort maxillary right side     Nose: Nose normal.      Mouth/Throat:      Mouth: Mucous membranes are moist.   Eyes:      Conjunctiva/sclera: Conjunctivae normal.   Cardiovascular:      Rate and Rhythm: Normal rate and regular rhythm.      Pulses: Normal pulses.      Heart sounds: Normal heart sounds.   Lymphadenopathy:      Cervical: No cervical adenopathy.   Neurological:       Mental Status: She is alert.         Labs:  No results found for this or any previous visit (from the past 24 hour(s)).        ASSESSMENT:      ICD-10-CM    1. Acute sinusitis with symptoms > 10 days  J01.90 cefdinir (OMNICEF) 300 MG capsule   2. Suspected Covid-19 Virus Infection  R68.89    3. TMJ (temporomandibular joint syndrome)  M26.609 ibuprofen (ADVIL/MOTRIN) 600 MG tablet     fluticasone (FLONASE) 50 MCG/ACT nasal spray    left        Medical Decision Making:  Since patient has having ongoing sinus pain with pain behind her eye I did give her a course of broad-spectrum antibiotics and encouraged her to use Flonase twice daily both for 10 days     She also has left TMJ pain which is new diagnoses.  Unclear whether or not this alone is because of her sinus pain    I instructed her care for TMJ pain    She was given routine covid information exposure.  Her son had a new fever today and they have been to the ER recently 2 weeks ago    followup:  2 weeks to her clinic by phone if not improved      Patient Instructions       You currently have TMJ from tooth clenching.    Eat soft foods while recovering.  Ibuprofen 600 mg up to 3 times a day with food for the pain.    Ice to area.  See your dentist if persistent problems for considering mouthguard.    For concerns of sinus infection I would like you to try Flonase twice daily for 10 days a  nd I am going to repeat an antibiotic course With Omnicef twice daily for 10 days.      eat yogurt while on antibiotics.      Follow-up by phone call in 2 week with your clinic if not better      You may have been exposed to covid virus and sinus infection can be complication for this.      Routine covid virus recommendations are below    Regardless of if you have been tested or not:  Patient who have symptoms (cough, fever, or shortness of breath), need to isolate for 7 days from when symptoms started AND 72 hours after fever resolves (without fever reducing medications) AND  improvement of respiratory symptoms (whichever is longer).      Isolate yourself at home (in own room/own bathroom if possible)    Do Not allow any visitors    Do Not go to work or school    Do Not go to [employer],  centers, shopping, or other public places.    Do Not shake hands.    Avoid close and intimate contact with others (hugging, kissing).    Follow CDC recommendations for household cleaning of frequently touched services.     After the initial 7 days, continue to isolate yourself from household members as much as possible. To continue decrease the risk of community spread and exposure, you and any members of your household should limit activities in public for 14 days after starting home isolation.     You can reference the following CDC link for helpful home isolation/care tips:  https://www.cdc.gov/coronavirus/2019-ncov/downloads/10Things.pdf    Protect Others:    Cover Your Mouth and Nose with a mask, disposable tissue or wash cloth to avoid spreading germs to others.    Wash your hands and face frequently with soap and water    Call Back If: Breathing difficulty develops or you become worse.    For more information about COVID19 and options for caring for yourself at home, please visit the CDC website at https://www.cdc.gov/coronavirus/2019-ncov/about/steps-when-sick.html  For more options for care at [facility], please visit our website at [email or web address]      Patient Education     Helping Your Temporomandibular Joint (TMJ) Heal    The temporomandibular joint (TMJ) is a ball-and-socket joint located where the upper and lower jaws meet. When the TMJ and related muscles are injured, they need time to heal. Self-care is very important. You can take steps to reduce pressure on the TMJ and speed healing.  Eating with care  Chewing strains the TMJ. When symptoms are bad, you may not be able to chew at all. To get you through times when your symptoms are at their  worst, try these tips:    Choose soft foods. These include scrambled eggs, oatmeal, yogurt, quiche, tofu, soup, smoothies, pasta, fish, mashed potatoes, milkshakes, bananas, applesauce, gelatin, or ice cream.    Don t bite into hard foods. These include whole apples, carrots, and corn on the cob. Instead cut foods into bite-sized pieces.    Grind or finely chop meats and other tough foods. Try hamburger meat instead of steak.  Using ice and heat  Your healthcare provider may suggest using ice and heat. Ice helps reduce swelling and pain. Heat helps relax muscles, increasing blood flow.    Use a gel pack or cold pack for severe pain. Apply for 10 to 20 minutes. Repeat as needed. To make a cold pack, put ice cubes in a plastic bag that seals at the top. Wrap the bag in a clean, thin towel or cloth. Never put ice or a cold pack directly on the skin.    Use moist heat for mild to moderate muscle pain. Apply a moist, warm towel to the muscles for 10 to 20 minutes. Repeat as needed.  Staying away from triggers  Certain activities (called triggers) strain the TMJ, making symptoms worse. The tips below can help you avoid common triggers and limit strain.    Don t eat hard or chewy foods. These include nuts, pretzels, popcorn, chips, gum, caramel, gummy candies, carrots, whole apples, hard breads, and even ice.    Reschedule routine dental visits, like cleanings, if your jaw aches. If you have severe pain, call your healthcare provider.    Support your jaw when yawning. When you feel a yawn coming on, put a fist under your jaw. Apply gentle pressure. This helps prevent wide, painful yawns.    Don t do any activity that hurts. This includes nail biting, yelling, and singing.  Maintaining good posture  Work at improving your posture during the day and when you sleep. Good posture can help your body heal. Try these tips:    Use a headset when on the phone. Don t cradle the phone with your shoulder.    Keep ergonomics in  mind. This includes making sure your workstation fits your body. Support your lower back. Take breaks often to stretch and rest. If you use a computer, keep the monitor at eye level.    Keep your head in a neutral position.  Keep your ears in line with your shoulders. Don t slouch or crane your head forward.    Use an orthopedic pillow. Use this to support your head and neck during sleep.  Date Last Reviewed: 8/1/2017 2000-2019 [employer]. [address] [place] [address], [place] [address]. All rights reserved. This information is not intended as a substitute for professional medical care. Always follow your healthcare professional's instructions.

## 2020-04-01 NOTE — PATIENT INSTRUCTIONS
You currently have TMJ from tooth clenching.    Eat soft foods while recovering.  Ibuprofen 600 mg up to 3 times a day with food for the pain.    Ice to area.  See your dentist if persistent problems for considering mouthguard.    For concerns of sinus infection I would like you to try Flonase twice daily for 10 days a  nd I am going to repeat an antibiotic course With Omnicef twice daily for 10 days.      eat yogurt while on antibiotics.      Follow-up by phone call in 2 week with your clinic if not better      You may have been exposed to covid virus and sinus infection can be complication for this.      Routine covid virus recommendations are below    Regardless of if you have been tested or not:  Patient who have symptoms (cough, fever, or shortness of breath), need to isolate for 7 days from when symptoms started AND 72 hours after fever resolves (without fever reducing medications) AND improvement of respiratory symptoms (whichever is longer).      Isolate yourself at home (in own room/own bathroom if possible)    Do Not allow any visitors    Do Not go to work or school    Do Not go to [employer],  centers, shopping, or other public places.    Do Not shake hands.    Avoid close and intimate contact with others (hugging, kissing).    Follow CDC recommendations for household cleaning of frequently touched services.     After the initial 7 days, continue to isolate yourself from household members as much as possible. To continue decrease the risk of community spread and exposure, you and any members of your household should limit activities in public for 14 days after starting home isolation.     You can reference the following CDC link for helpful home isolation/care tips:  https://www.cdc.gov/coronavirus/2019-ncov/downloads/10Things.pdf    Protect Others:    Cover Your Mouth and Nose with a mask, disposable tissue or wash cloth to avoid spreading germs to others.    Wash your hands and face frequently  with soap and water    Call Back If: Breathing difficulty develops or you become worse.    For more information about COVID19 and options for caring for yourself at home, please visit the CDC website at https://www.cdc.gov/coronavirus/2019-ncov/about/steps-when-sick.html  For more options for care at [facility], please visit our website at [email or web address]      Patient Education     Helping Your Temporomandibular Joint (TMJ) Heal    The temporomandibular joint (TMJ) is a ball-and-socket joint located where the upper and lower jaws meet. When the TMJ and related muscles are injured, they need time to heal. Self-care is very important. You can take steps to reduce pressure on the TMJ and speed healing.  Eating with care  Chewing strains the TMJ. When symptoms are bad, you may not be able to chew at all. To get you through times when your symptoms are at their worst, try these tips:    Choose soft foods. These include scrambled eggs, oatmeal, yogurt, quiche, tofu, soup, smoothies, pasta, fish, mashed potatoes, milkshakes, bananas, applesauce, gelatin, or ice cream.    Don t bite into hard foods. These include whole apples, carrots, and corn on the cob. Instead cut foods into bite-sized pieces.    Grind or finely chop meats and other tough foods. Try hamburger meat instead of steak.  Using ice and heat  Your healthcare provider may suggest using ice and heat. Ice helps reduce swelling and pain. Heat helps relax muscles, increasing blood flow.    Use a gel pack or cold pack for severe pain. Apply for 10 to 20 minutes. Repeat as needed. To make a cold pack, put ice cubes in a plastic bag that seals at the top. Wrap the bag in a clean, thin towel or cloth. Never put ice or a cold pack directly on the skin.    Use moist heat for mild to moderate muscle pain. Apply a moist, warm towel to the muscles for 10 to 20 minutes. Repeat as needed.  Staying away from triggers  Certain activities  (called triggers) strain the TMJ, making symptoms worse. The tips below can help you avoid common triggers and limit strain.    Don t eat hard or chewy foods. These include nuts, pretzels, popcorn, chips, gum, caramel, gummy candies, carrots, whole apples, hard breads, and even ice.    Reschedule routine dental visits, like cleanings, if your jaw aches. If you have severe pain, call your healthcare provider.    Support your jaw when yawning. When you feel a yawn coming on, put a fist under your jaw. Apply gentle pressure. This helps prevent wide, painful yawns.    Don t do any activity that hurts. This includes nail biting, yelling, and singing.  Maintaining good posture  Work at improving your posture during the day and when you sleep. Good posture can help your body heal. Try these tips:    Use a headset when on the phone. Don t cradle the phone with your shoulder.    Keep ergonomics in mind. This includes making sure your workstation fits your body. Support your lower back. Take breaks often to stretch and rest. If you use a computer, keep the monitor at eye level.    Keep your head in a neutral position.  Keep your ears in line with your shoulders. Don t slouch or crane your head forward.    Use an orthopedic pillow. Use this to support your head and neck during sleep.  Date Last Reviewed: 8/1/2017 2000-2019 [employer]. [address] [place] [address], [place] [address]. All rights reserved. This information is not intended as a substitute for professional medical care. Always follow your healthcare professional's instructions.

## 2020-04-01 NOTE — TELEPHONE ENCOUNTER
Acute Illness   Acute illness concerns: Sinus Pressure  Onset: 3/10/20    Fever: no    Chills/Sweats: no    Headache (location?): YES- frontal    Sinus Pressure:YES- tender and post-nasal drainage    Conjunctivitis:  no    Ear Pain: YES: left    Rhinorrhea: no    Congestion: YES    Sore Throat: no     Cough: no    Wheeze: no    Decreased Appetite: no    Nausea: no    Vomiting: no    Diarrhea:  no    Dysuria/Freq.: no    Fatigue/Achiness: no    Sick/Strep Exposure: no     Therapies Tried and outcome: Nothing     DENIES: CP, SOB, Difficulty Breathing, Dizziness/Lightheadedness, Numbness/Tingling, Vision/Hearing Changes, N/V, Palpitations    Scheduled in  [facility].     Advised patient that if new or worsening symptoms appear (reviewed new & worsening symptoms) to call the clinic or be seen in the the ER  Patient stated an understanding and agreed with plan.      Jazz Brannon RN  [facility]

## 2020-04-02 NOTE — TELEPHONE ENCOUNTER
"\"I was seen in [facility] today and they put in my chart that I was suspected for Covid 19, why did they do that?\" Explained [facility] protocol for anyone with respiratory sx. Patient states she understands and agrees with plan of care.  Gini Hines RN [facility]        Reason for Disposition    [1] Follow-up call to recent contact AND [2] information only call, no triage required    Protocols used: INFORMATION ONLY CALL-A-AH      "

## 2020-12-29 ENCOUNTER — TELEPHONE (OUTPATIENT)
Dept: FAMILY MEDICINE | Facility: CLINIC | Age: 50
End: 2020-12-29

## 2020-12-29 NOTE — TELEPHONE ENCOUNTER
Needs of attention regarding:  -Cervical Cancer Screening    Health Maintenance Topics with due status: Overdue       Topic Date Due    HEPATITIS C SCREENING 01/03/1989    PAP 06/29/2019    MEDICARE ANNUAL WELLNESS VISIT 11/28/2019    DTAP/TDAP/TD IMMUNIZATION 08/04/2020    INFLUENZA VACCINE 09/01/2020     Health Maintenance Topics with due status: Due Soon       Topic Date Due    ZOSTER IMMUNIZATION 01/03/2021       Communication:  See MyChart message  Gabriella Antoine CMA

## 2021-01-18 DIAGNOSIS — L70.0 ACNE VULGARIS: ICD-10-CM

## 2021-01-20 NOTE — TELEPHONE ENCOUNTER
Pt needs OV/VV    Routing to  uday BUTLER RN   [facility] [place] Triage

## 2021-01-22 ENCOUNTER — VIRTUAL VISIT (OUTPATIENT)
Dept: FAMILY MEDICINE | Facility: CLINIC | Age: 51
End: 2021-01-22
Payer: MEDICARE

## 2021-01-22 ENCOUNTER — TELEPHONE (OUTPATIENT)
Dept: FAMILY MEDICINE | Facility: CLINIC | Age: 51
End: 2021-01-22

## 2021-01-22 DIAGNOSIS — M54.41 CHRONIC RIGHT-SIDED LOW BACK PAIN WITH RIGHT-SIDED SCIATICA: ICD-10-CM

## 2021-01-22 DIAGNOSIS — Z30.015 ENCOUNTER FOR INITIAL PRESCRIPTION OF VAGINAL RING HORMONAL CONTRACEPTIVE: ICD-10-CM

## 2021-01-22 DIAGNOSIS — M62.830 BACK MUSCLE SPASM: ICD-10-CM

## 2021-01-22 DIAGNOSIS — L70.0 ACNE VULGARIS: Primary | ICD-10-CM

## 2021-01-22 DIAGNOSIS — G89.29 CHRONIC RIGHT-SIDED LOW BACK PAIN WITH RIGHT-SIDED SCIATICA: ICD-10-CM

## 2021-01-22 PROCEDURE — 99214 OFFICE O/P EST MOD 30 MIN: CPT | Mod: 95 | Performed by: NURSE PRACTITIONER

## 2021-01-22 RX ORDER — ETONOGESTREL AND ETHINYL ESTRADIOL VAGINAL RING .015; .12 MG/D; MG/D
RING VAGINAL
Qty: 3 EACH | Refills: 1 | Status: SHIPPED | OUTPATIENT
Start: 2021-01-22 | End: 2021-07-20

## 2021-01-22 RX ORDER — IBUPROFEN 800 MG/1
TABLET, FILM COATED ORAL
Qty: 60 TABLET | Refills: 0 | Status: SHIPPED | OUTPATIENT
Start: 2021-01-22 | End: 2021-02-16

## 2021-01-22 RX ORDER — TRETINOIN 0.25 MG/G
CREAM TOPICAL
Qty: 45 G | Refills: 11 | Status: SHIPPED | OUTPATIENT
Start: 2021-01-22

## 2021-01-22 RX ORDER — CYCLOBENZAPRINE HCL 10 MG
10 TABLET ORAL 3 TIMES DAILY PRN
Qty: 30 TABLET | Refills: 1 | Status: SHIPPED | OUTPATIENT
Start: 2021-01-22

## 2021-01-22 RX ORDER — TRETINOIN 0.25 MG/G
CREAM TOPICAL
Qty: 45 G | Refills: 11 | OUTPATIENT
Start: 2021-01-22

## 2021-01-22 NOTE — PATIENT INSTRUCTIONS
Pap done elsewhere-[facility]?? Will try to get records or send a release.  Flexeril for shoulder and neck spasm. Let us know if not improving.  Filled ibuprofen for as needed use.   Filled Retin-A

## 2021-01-22 NOTE — PROGRESS NOTES
Bernarda Garrett is a 51 year old who is being evaluated via a billable video visit.      How would you like to obtain your AVS? MyChart  If the video visit is dropped, the invitation should be resent by: Text to cell phone: [phone]  Will anyone else be joining your video visit? No      Video Start Time: 215 PM  Assessment & Plan     Acne vulgaris  - tretinoin (RETIN-A) 0.025 % external cream  Dispense: 45 g; Refill: 11    Encounter for initial prescription of vaginal ring hormonal contraceptive  - etonogestrel-ethinyl estradiol (NUVARING) 0.12-0.015 MG/24HR vaginal ring  Dispense: 3 each; Refill: 1    Chronic right-sided low back pain with right-sided sciatica  - ibuprofen (ADVIL/MOTRIN) 800 MG tablet  Dispense: 60 tablet; Refill: 0    Back muscle spasm  - cyclobenzaprine (FLEXERIL) 10 MG tablet  Dispense: 30 tablet; Refill: 1        30 minutes spent on the date of the encounter doing chart review, history and exam, documentation and further activities as noted above           Patient Instructions   Pap done elsewhere-[facility]?? Will try to get records or send a release.  Flexeril for shoulder and neck spasm. Let us know if not improving.  Filled ibuprofen for as needed use.   Filled Retin-A      Return in about 3 months (around 4/22/2021) for Routine preventive, with any available provider.    Ely Granda, [facility]     Bernarda Garrett is a 51 year old who presents to clinic today for the following health issues     HPI       Concern - Has Questions about Nuvaring and Retin A to be covered  Onset: OVERDUE for ppointment    Has been out of nuvaring for a while but uses to control periods. Periods have still been regular and heavy without ring. Discussed taking a break here and there given age to see if periods stop.    Retin A for acne needed, filled. Works well.    Injured shoulder blade area lifting son. Having some muscle spasm for 1 week. Has used muscle relaxant  before.     Ibuprofen filled, uses for as needed for back pain with sciatica.    Review of Systems   Constitutional, HEENT, cardiovascular, pulmonary, GI, , musculoskeletal, neuro, skin, endocrine and psych systems are negative, except as otherwise noted.      Objective           Vitals:  No vitals were obtained today due to virtual visit.    Physical Exam   GENERAL: Healthy, alert and no distress  EYES: Eyes grossly normal to inspection.  No discharge or erythema, or obvious scleral/conjunctival abnormalities.  RESP: No audible wheeze, cough, or visible cyanosis.  No visible retractions or increased work of breathing.    SKIN: Visible skin clear. No significant rash, abnormal pigmentation or lesions.  NEURO: Cranial nerves grossly intact.  Mentation and speech appropriate for age.  PSYCH: Mentation appears normal, affect normal/bright, judgement and insight intact, normal speech and appearance well-groomed.                Video-Visit Details    Type of service:  Video Visit    Video End Time:226    Originating Location (pt. Location): Home    Distant Location (provider location):  [facility]     Platform used for Video Visit: REYNA Nix     [address] [address]  [email or web address]   Office: [phone]

## 2021-01-22 NOTE — TELEPHONE ENCOUNTER
Prior Authorization Retail Medication Request    Medication/Dose: Tretinoin  ICD code (if different than what is on RX):    Previously Tried and Failed:  See chart  Rationale:  Not covered    Insurance Name:  Not listed  Insurance ID:  [id number]      Pharmacy Information (if different than what is on RX)  Name:  [facility]  Phone:  [phone]    Prescriber, please advise on if you want to change medication or initiate PA.  Phoebe Smith

## 2021-01-22 NOTE — TELEPHONE ENCOUNTER
Will have provider address at [facility]    Jazz Brannon RN  [facility]

## 2021-01-22 NOTE — TELEPHONE ENCOUNTER
Prior Authorization Retail Medication Request    Medication/Dose: Nuvaring  ICD code (if different than what is on RX):    Previously Tried and Failed:  See chart  Rationale:  Plan does not cover this medication    Insurance Name:  Not listed  Insurance ID:  [id number]      Pharmacy Information (if different than what is on RX)  Name:  [facility]  Phone:  [phone]    Prescriber, please advise on if you want to change medication or initiate PA.  Phoebe Smith

## 2021-01-26 NOTE — TELEPHONE ENCOUNTER
PRIOR AUTHORIZATION DENIED    Medication: tretinoin (RETIN-A) 0.025 % external cream--DENIED    Denial Date: 1/26/2021    Denial Rational: Per insurance rep medication is excluded.

## 2021-01-26 NOTE — TELEPHONE ENCOUNTER
Central Prior Authorization Team   Phone: [phone]      PA Initiation    Medication: etonogestrel-ethinyl estradiol (NUVARING) 0.12-0.015 MG/24HR vaginal ring - INITIATED  Insurance Company: [employer] - Phone [phone] Fax [phone]  Pharmacy Filling the Rx: [id number] - [place], [place] - [address] AT [facility] [address]  Filling Pharmacy Phone: [phone]  Filling Pharmacy Fax: [phone]  Start Date: 1/26/2021

## 2021-01-27 ENCOUNTER — TELEPHONE (OUTPATIENT)
Dept: FAMILY MEDICINE | Facility: CLINIC | Age: 51
End: 2021-01-27

## 2021-01-27 NOTE — TELEPHONE ENCOUNTER
Prior Authorization Approval    Authorization Effective Date: 1/1/2021  Authorization Expiration Date: 1/26/2022  Medication: etonogestrel-ethinyl estradiol (NUVARING) 0.12-0.015 MG/24HR vaginal ring - INITIATED  Approved Dose/Quantity: 3 for 84   Insurance Company: [employer] - Phone [phone] Fax [phone]  Which Pharmacy is filling the prescription (Not needed for infusion/clinic administered): [id number] - [place], [place] - [address] AT [facility]  Pharmacy Notified: Yes  Patient Notified: Yes - patient already picked up

## 2021-01-28 NOTE — TELEPHONE ENCOUNTER
Writer spoke to Pharmacy who noted insurance is covering a large portion of the medication. Out of pocket would be $210. The amount needing to pay currently may be a deductible issue. Pt was also noted to have different insurance last year. May have been covered under other plan, not this current plan.    Attempt # 1  Called # [phone]     Left a non detailed VM to call back at [phone] and ask for any available Triage Nurse.      Jaden Bay RN   [facility] [place] Triage

## 2021-01-29 ENCOUNTER — TELEPHONE (OUTPATIENT)
Dept: FAMILY MEDICINE | Facility: CLINIC | Age: 51
End: 2021-01-29

## 2021-01-29 NOTE — TELEPHONE ENCOUNTER
Reason for Call:  Form, our goal is to have forms completed with 72 hours, however, some forms may require a visit or additional information.    Type of letter, form or note:  Prior Auth    Who is the form from?: [facility] (if other please explain)    Where did the form come from: form was faxed in    What clinic location was the form placed at?: [place]    Where the form was placed: [facility] Box/Folder    What number is listed as a contact on the form?: [phone]           Call taken on 1/29/2021 at 5:22 PM by Rachele Triplett

## 2021-02-12 DIAGNOSIS — G89.29 CHRONIC RIGHT-SIDED LOW BACK PAIN WITH RIGHT-SIDED SCIATICA: ICD-10-CM

## 2021-02-12 DIAGNOSIS — M54.41 CHRONIC RIGHT-SIDED LOW BACK PAIN WITH RIGHT-SIDED SCIATICA: ICD-10-CM

## 2021-02-15 NOTE — TELEPHONE ENCOUNTER
Routing refill request to provider for review/approval because:        Jazz Brannon RN  [facility]

## 2021-02-16 RX ORDER — IBUPROFEN 800 MG/1
TABLET, FILM COATED ORAL
Qty: 60 TABLET | Refills: 0 | Status: SHIPPED | OUTPATIENT
Start: 2021-02-16

## 2021-04-12 NOTE — TELEPHONE ENCOUNTER
Pt would like 3 nuvarings instead of only one.    Halima and ok. Advised pharmacy as well    Rody Calvert RN  [place] Triage     Responded to patient via mychart    Jah Nino RN

## 2021-05-25 ENCOUNTER — RECORDS - HEALTHEAST (OUTPATIENT)
Dept: ADMINISTRATIVE | Facility: CLINIC | Age: 51
End: 2021-05-25

## 2021-07-18 DIAGNOSIS — Z30.015 ENCOUNTER FOR INITIAL PRESCRIPTION OF VAGINAL RING HORMONAL CONTRACEPTIVE: ICD-10-CM

## 2021-07-20 RX ORDER — ETONOGESTREL AND ETHINYL ESTRADIOL VAGINAL RING .015; .12 MG/D; MG/D
RING VAGINAL
Qty: 3 EACH | Refills: 1 | Status: SHIPPED | OUTPATIENT
Start: 2021-07-20

## 2025-06-22 ENCOUNTER — HOSPITAL ENCOUNTER (EMERGENCY)
Facility: CLINIC | Age: 55
Discharge: HOME | End: 2025-06-22
Attending: EMERGENCY MEDICINE | Admitting: EMERGENCY MEDICINE
Payer: MEDICARE

## 2025-06-22 VITALS
TEMPERATURE: 97 F | RESPIRATION RATE: 16 BRPM | DIASTOLIC BLOOD PRESSURE: 69 MMHG | HEIGHT: 65 IN | HEART RATE: 70 BPM | BODY MASS INDEX: 30.09 KG/M2 | OXYGEN SATURATION: 100 % | SYSTOLIC BLOOD PRESSURE: 109 MMHG | WEIGHT: 180.6 LBS

## 2025-06-22 DIAGNOSIS — K62.5 RECTAL BLEEDING: ICD-10-CM

## 2025-06-22 DIAGNOSIS — K64.8 INTERNAL AND EXTERNAL BLEEDING HEMORRHOIDS: ICD-10-CM

## 2025-06-22 DIAGNOSIS — R53.1 GENERALIZED WEAKNESS: ICD-10-CM

## 2025-06-22 DIAGNOSIS — N17.9 AKI (ACUTE KIDNEY INJURY): ICD-10-CM

## 2025-06-22 DIAGNOSIS — K64.4 INTERNAL AND EXTERNAL BLEEDING HEMORRHOIDS: ICD-10-CM

## 2025-06-22 LAB
ABO + RH BLD: NORMAL
ALBUMIN SERPL BCG-MCNC: 4.1 G/DL (ref 3.5–5.2)
ALP SERPL-CCNC: 63 U/L (ref 40–150)
ALT SERPL W P-5'-P-CCNC: 15 U/L (ref 0–50)
ANION GAP SERPL CALCULATED.3IONS-SCNC: 11 MMOL/L (ref 7–15)
AST SERPL W P-5'-P-CCNC: 20 U/L (ref 0–45)
BASOPHILS # BLD AUTO: 0 10E3/UL (ref 0–0.2)
BASOPHILS NFR BLD AUTO: 1 %
BILIRUB SERPL-MCNC: 0.5 MG/DL
BLD GP AB SCN SERPL QL: NEGATIVE
BUN SERPL-MCNC: 7.8 MG/DL (ref 6–20)
CALCIUM SERPL-MCNC: 9 MG/DL (ref 8.8–10.4)
CHLORIDE SERPL-SCNC: 103 MMOL/L (ref 98–107)
CREAT SERPL-MCNC: 1.18 MG/DL (ref 0.51–0.95)
EGFRCR SERPLBLD CKD-EPI 2021: 55 ML/MIN/1.73M2
EOSINOPHIL # BLD AUTO: 0.1 10E3/UL (ref 0–0.7)
EOSINOPHIL NFR BLD AUTO: 1 %
ERYTHROCYTE [DISTWIDTH] IN BLOOD BY AUTOMATED COUNT: 15 % (ref 10–15)
GLUCOSE SERPL-MCNC: 121 MG/DL (ref 70–99)
HCO3 SERPL-SCNC: 26 MMOL/L (ref 22–29)
HCT VFR BLD AUTO: 26.9 % (ref 35–47)
HEMOCCULT STL QL: POSITIVE
HGB BLD-MCNC: 8.6 G/DL (ref 11.7–15.7)
IMM GRANULOCYTES # BLD: 0 10E3/UL
IMM GRANULOCYTES NFR BLD: 0 %
LYMPHOCYTES # BLD AUTO: 1.6 10E3/UL (ref 0.8–5.3)
LYMPHOCYTES NFR BLD AUTO: 31 %
MCH RBC QN AUTO: 26 PG (ref 26.5–33)
MCHC RBC AUTO-ENTMCNC: 32 G/DL (ref 31.5–36.5)
MCV RBC AUTO: 81 FL (ref 78–100)
MONOCYTES # BLD AUTO: 0.3 10E3/UL (ref 0–1.3)
MONOCYTES NFR BLD AUTO: 6 %
NEUTROPHILS # BLD AUTO: 3.1 10E3/UL (ref 1.6–8.3)
NEUTROPHILS NFR BLD AUTO: 61 %
NRBC # BLD AUTO: 0 10E3/UL
NRBC BLD AUTO-RTO: 0 /100
PLATELET # BLD AUTO: 294 10E3/UL (ref 150–450)
POTASSIUM SERPL-SCNC: 3.8 MMOL/L (ref 3.4–5.3)
PROT SERPL-MCNC: 6.9 G/DL (ref 6.4–8.3)
RBC # BLD AUTO: 3.31 10E6/UL (ref 3.8–5.2)
SODIUM SERPL-SCNC: 140 MMOL/L (ref 135–145)
SPECIMEN EXP DATE BLD: NORMAL
WBC # BLD AUTO: 5 10E3/UL (ref 4–11)

## 2025-06-22 PROCEDURE — 36415 COLL VENOUS BLD VENIPUNCTURE: CPT | Performed by: EMERGENCY MEDICINE

## 2025-06-22 PROCEDURE — 85004 AUTOMATED DIFF WBC COUNT: CPT | Performed by: EMERGENCY MEDICINE

## 2025-06-22 PROCEDURE — 82247 BILIRUBIN TOTAL: CPT | Performed by: EMERGENCY MEDICINE

## 2025-06-22 PROCEDURE — 86900 BLOOD TYPING SEROLOGIC ABO: CPT | Performed by: EMERGENCY MEDICINE

## 2025-06-22 PROCEDURE — 99283 EMERGENCY DEPT VISIT LOW MDM: CPT

## 2025-06-22 PROCEDURE — 258N000003 HC RX IP 258 OP 636: Performed by: EMERGENCY MEDICINE

## 2025-06-22 PROCEDURE — 82272 OCCULT BLD FECES 1-3 TESTS: CPT | Performed by: EMERGENCY MEDICINE

## 2025-06-22 RX ORDER — FERROUS SULFATE 325(65) MG
325 TABLET ORAL
Qty: 14 TABLET | Refills: 0 | Status: SHIPPED | OUTPATIENT
Start: 2025-06-22 | End: 2025-07-06

## 2025-06-22 RX ADMIN — SODIUM CHLORIDE 500 ML: 0.9 INJECTION, SOLUTION INTRAVENOUS at 10:08

## 2025-06-22 ASSESSMENT — COLUMBIA-SUICIDE SEVERITY RATING SCALE - C-SSRS
2. HAVE YOU ACTUALLY HAD ANY THOUGHTS OF KILLING YOURSELF IN THE PAST MONTH?: NO
6. HAVE YOU EVER DONE ANYTHING, STARTED TO DO ANYTHING, OR PREPARED TO DO ANYTHING TO END YOUR LIFE?: NO
1. IN THE PAST MONTH, HAVE YOU WISHED YOU WERE DEAD OR WISHED YOU COULD GO TO SLEEP AND NOT WAKE UP?: NO

## 2025-06-22 ASSESSMENT — ACTIVITIES OF DAILY LIVING (ADL)
ADLS_ACUITY_SCORE: 41

## 2025-06-22 NOTE — ED PROVIDER NOTES
"  Emergency Department Note      History of Present Illness     Chief Complaint   Rectal Bleeding      HPI   Bernarda Garrett is a 55 year old female with a history of HTN, GERD, PTSD, and GI bleed presenting to the ED for rectal bleeding and generalized weakness. She was recently discharged from [facility] yesterday for a GI bleed, but now reports generalized weakness, which is consistent with feeling anemic. She also states that she had a black colored bloody BM today. Denies bright red stool, fever, abdominal pain, dysuria, or emesis.     Independent Historian   None    Review of External Notes   Reviewed patient's discharge summary from 6/21/2025.  GI bleeding  -Patient with recurrent GI bleeding after workup with EGD and colonoscopy just days prior.  -last hgb 8.4 (6/21/2025)  -Last INR 1.0 (6/14/2025)   -Had 1 unit of Packed red blood cells transfusion 6/19  -GI consult - repeated colonoscopy 6/18: There was brown stool which indicates no recent bleeding, given normal TI intubation, transient large hematochezia, suspecting diverticular bleeding.   -NM GI Blood loss 6/20: No definite evidence of acute gastrointestinal bleeding.   -Patient is adamant about going home today. Recommend to stay on fiber restricted diet and if bleeding reoccurs, recommending to check in at a [facility] where there is IR availability along with GI.     Past Medical History     Medical History and Problem List   HTN  Bipolar affective disorder  Heart murmur   Mitral valve disorder   GI bleed  GERD  PTSD    Medications   Albuterol  Flexeril  Nuvaring   Claritin  Singulair  Naprosyn  Atarax   Protonix   Wellbutrin   Ativan     Surgical History   IR ERCP  Dilation and curettage  [place] teeth extraction    Physical Exam     Patient Vitals for the past 24 hrs:   BP Temp Temp src Pulse Resp SpO2 Height Weight   06/22/25 0757 135/78 97  F (36.1  C) Temporal 78 16 97 % 1.651 m (5' 5\") 81.9 kg (180 lb 9.6 oz)     Physical Exam  General: " Well-nourished, resting comfortably when I enter the room  Eyes: Pupils equal, conjunctivae pink no scleral icterus or conjunctival injection  ENT:  Moist mucus membranes  Respiratory:  Lungs clear to auscultation bilaterally, no crackles/rubs/wheezes.  Good air movement  CV: Normal rate and rhythm, no murmurs  GI:  Abdomen soft and non-distended.  No tenderness, guarding or rebound  Skin: Warm, dry.  No rashes or petechiae  Musculoskeletal: No peripheral edema or calf tenderness  Neuro: Alert and oriented to person/place/time  Psychiatric: Normal affect  Rectal: Brown stool with bright red blood.  External hemorrhoids, nonthrombosed.    Diagnostics     Lab Results   Labs Ordered and Resulted from Time of ED Arrival to Time of ED Departure   COMPREHENSIVE METABOLIC PANEL - Abnormal       Result Value    Sodium 140      Potassium 3.8      Carbon Dioxide (CO2) 26      Anion Gap 11      Urea Nitrogen 7.8      Creatinine 1.18 (*)     GFR Estimate 55 (*)     Calcium 9.0      Chloride 103      Glucose 121 (*)     Alkaline Phosphatase 63      AST 20      ALT 15      Protein Total 6.9      Albumin 4.1      Bilirubin Total 0.5     CBC WITH PLATELETS AND DIFFERENTIAL - Abnormal    WBC Count 5.0      RBC Count 3.31 (*)     Hemoglobin 8.6 (*)     Hematocrit 26.9 (*)     MCV 81      MCH 26.0 (*)     MCHC 32.0      RDW 15.0      Platelet Count 294      % Neutrophils 61      % Lymphocytes 31      % Monocytes 6      % Eosinophils 1      % Basophils 1      % Immature Granulocytes 0      NRBCs per 100 WBC 0      Absolute Neutrophils 3.1      Absolute Lymphocytes 1.6      Absolute Monocytes 0.3      Absolute Eosinophils 0.1      Absolute Basophils 0.0      Absolute Immature Granulocytes 0.0      Absolute NRBCs 0.0     OCCULT BLOOD STOOL - Abnormal    Occult Blood Positive (*)    TYPE AND SCREEN, ADULT    ABO/RH(D) A POS      Antibody Screen Negative      SPECIMEN EXPIRATION DATE 6/25/2025 11:59:00 PM CDT     ABO/RH TYPE AND SCREEN        Imaging   No orders to display         Independent Interpretation   None    ED Course      Medications Administered   Medications   sodium chloride 0.9% BOLUS 500 mL (has no administration in time range)       Procedures   Procedures     Discussion of Management   I spoke with Dr. Cannon with GI, he will set up an appointment with the patient tomorrow, no need for hospitalization for the bleeding.  Most likely from hemorrhoids.  Reassuring that the patient's hemoglobin has not dropped.    ED Course   ED Course as of 06/22/25 1002   Sun Jun 22, 2025   0905 I obtained history and examined the patient as noted above.     0937 I rechecked the patient.    0948 I consulted with GI, Dr. Cannon, about the patient and plan of care.    0952 I updated the patient.        Additional Documentation  None    Medical Decision Making / Diagnosis     CMS Diagnoses: None    MIPS   None     [facility]   Bernarda Garrett is a 55 year old female presents to the emergency department with a complaint of generalized weakness.  Patient reports that she recently had a GI bleed, and has been feeling overall weak.  Patient reports that she is not having bright red blood in her stool, but she did have a bowel movement today with dark black blood.  No vomiting.  No fevers or abdominal pain.  On exam, patient is not in any acute distress, vital signs are all within acceptable limits.  Abdomen is soft and nontender.  Patient's hemoglobin on discharge was 8.4.  Today her hemoglobin is similar at 8.6.  She does have a slight CARMINE with a creatinine of 1.18.  I do not think that she needs any further advanced imaging here in the emergency department such as a CT scan as she is not having any abdominal pain.  I did speak with Dr. Cannon, patient has already had a colonoscopy and upper endoscopy.  Most likely the bleeding is coming from the internal and external hemorrhoids.  Recommends follow-up with colorectal surgery.  Vital signs and hemoglobin are stable,  no need for admission for the GI bleeding.  I made a referral to colorectal surgery.  Also Dr. Cannon's office will see the patient tomorrow.  Patient is prescribed some iron pills at her request.  She is given a small fluid bolus.  She is discharged home.    Disposition   The patient was discharged.     Diagnosis     ICD-10-CM    1. Rectal bleeding  K62.5 Adult Colorectal Surgery  Referral      2. Generalized weakness  R53.1       3. CARMINE (acute kidney injury)  N17.9       4. Internal and external bleeding hemorrhoids  K64.4 Adult Colorectal Surgery  Referral    K64.8            Discharge Medications   New Prescriptions    FERROUS SULFATE (FEROSUL) 325 (65 FE) MG TABLET    Take 1 tablet (325 mg) by mouth daily (with breakfast) for 14 days.         Scribe Disclosure:  I, Nba Kelly, am serving as a scribe at 9:40 AM on 6/22/2025 to document services personally performed by Amparo Ward DO based on my observations and the provider's statements to me.        Amparo Ward MD  06/22/25 0066

## 2025-06-22 NOTE — ED TRIAGE NOTES
Pt was discharged from [facility] yesterday with GI workout   Pt has rectal bleeding and hx of hemorrhoids   Pt has bright red blood

## 2025-06-22 NOTE — DISCHARGE INSTRUCTIONS
Please drink plenty of fluids.  Please follow-up with your GI clinic, this is [facility].  You can also follow-up with colorectal surgery, I have placed a consult for you.  You can take iron pills.  Please return to the emergency department if your symptoms worsen, you experience shortness of breath, lightheadedness, chest pain, abdominal pain, increased bleeding.

## 2025-06-23 ENCOUNTER — PATIENT OUTREACH (OUTPATIENT)
Dept: CARE COORDINATION | Facility: CLINIC | Age: 55
End: 2025-06-23
Payer: MEDICARE

## 2025-06-24 ENCOUNTER — PATIENT OUTREACH (OUTPATIENT)
Dept: CARE COORDINATION | Facility: CLINIC | Age: 55
End: 2025-06-24
Payer: MEDICARE

## 2025-06-24 NOTE — PROGRESS NOTES
Connected Care Resource Center Contact  [facility]/Voicemail     Clinical Data: Care Coordination ED-sourced Outreach-     Outreach attempted x 2.  Left message on patient's voicemail, providing [facility]'s 24/7 scheduling and nurse triage phone number [phone] ([phone]) for questions/concerns and/or to schedule an appt with an [facility] provider.        Plan: [facility] will do no further outreaches at this time.       Amparo Teresa MA  Connected Care Resource Center, [facility]    *Connected Care Resource Team does NOT follow patient ongoing. Referrals are identified based on internal discharge reports and the outreach is to ensure patient has an understanding of their discharge instructions.

## 2025-06-25 ENCOUNTER — PATIENT OUTREACH (OUTPATIENT)
Dept: CARE COORDINATION | Facility: CLINIC | Age: 55
End: 2025-06-25
Payer: MEDICARE

## 2025-07-21 ENCOUNTER — PATIENT OUTREACH (OUTPATIENT)
Dept: CARE COORDINATION | Facility: CLINIC | Age: 55
End: 2025-07-21
Payer: MEDICARE